# Patient Record
Sex: MALE | Race: OTHER
[De-identification: names, ages, dates, MRNs, and addresses within clinical notes are randomized per-mention and may not be internally consistent; named-entity substitution may affect disease eponyms.]

---

## 2018-03-01 PROBLEM — Z00.00 ENCOUNTER FOR PREVENTIVE HEALTH EXAMINATION: Status: ACTIVE | Noted: 2018-03-01

## 2018-07-19 ENCOUNTER — APPOINTMENT (OUTPATIENT)
Dept: UROLOGY | Facility: CLINIC | Age: 73
End: 2018-07-19
Payer: MEDICARE

## 2018-07-19 DIAGNOSIS — Z78.9 OTHER SPECIFIED HEALTH STATUS: ICD-10-CM

## 2018-07-19 DIAGNOSIS — I25.2 OLD MYOCARDIAL INFARCTION: ICD-10-CM

## 2018-07-19 DIAGNOSIS — Z86.73 PERSONAL HISTORY OF TRANSIENT ISCHEMIC ATTACK (TIA), AND CEREBRAL INFARCTION W/OUT RESIDUAL DEFICITS: ICD-10-CM

## 2018-07-19 DIAGNOSIS — Z80.9 FAMILY HISTORY OF MALIGNANT NEOPLASM, UNSPECIFIED: ICD-10-CM

## 2018-07-19 PROCEDURE — 99214 OFFICE O/P EST MOD 30 MIN: CPT

## 2018-07-19 PROCEDURE — 99204 OFFICE O/P NEW MOD 45 MIN: CPT

## 2018-07-19 RX ORDER — ASPIRIN 325 MG
81 TABLET ORAL
Refills: 0 | Status: ACTIVE | COMMUNITY

## 2018-07-19 RX ORDER — UBIDECARENONE 100 MG
100 CAPSULE ORAL
Refills: 0 | Status: ACTIVE | COMMUNITY

## 2018-07-19 RX ORDER — MENTHOL/CAMPHOR 0.5 %-0.5%
1000 LOTION (ML) TOPICAL
Refills: 0 | Status: ACTIVE | COMMUNITY

## 2019-12-11 ENCOUNTER — APPOINTMENT (OUTPATIENT)
Dept: UROLOGY | Facility: CLINIC | Age: 74
End: 2019-12-11

## 2020-01-24 ENCOUNTER — OUTPATIENT (OUTPATIENT)
Dept: OUTPATIENT SERVICES | Facility: HOSPITAL | Age: 75
LOS: 1 days | Discharge: HOME | End: 2020-01-24
Payer: MEDICARE

## 2020-01-24 DIAGNOSIS — J98.4 OTHER DISORDERS OF LUNG: ICD-10-CM

## 2020-01-24 PROCEDURE — 71046 X-RAY EXAM CHEST 2 VIEWS: CPT | Mod: 26

## 2020-02-06 ENCOUNTER — OUTPATIENT (OUTPATIENT)
Dept: OUTPATIENT SERVICES | Facility: HOSPITAL | Age: 75
LOS: 1 days | Discharge: HOME | End: 2020-02-06

## 2020-02-06 ENCOUNTER — INPATIENT (INPATIENT)
Facility: HOSPITAL | Age: 75
LOS: 0 days | Discharge: HOME | End: 2020-02-07
Attending: INTERNAL MEDICINE | Admitting: INTERNAL MEDICINE
Payer: MEDICARE

## 2020-02-06 VITALS
HEART RATE: 53 BPM | WEIGHT: 190.04 LBS | DIASTOLIC BLOOD PRESSURE: 68 MMHG | RESPIRATION RATE: 16 BRPM | SYSTOLIC BLOOD PRESSURE: 140 MMHG | TEMPERATURE: 98 F | HEIGHT: 67 IN

## 2020-02-06 DIAGNOSIS — I25.2 OLD MYOCARDIAL INFARCTION: ICD-10-CM

## 2020-02-06 DIAGNOSIS — E78.5 HYPERLIPIDEMIA, UNSPECIFIED: ICD-10-CM

## 2020-02-06 DIAGNOSIS — I25.810 ATHEROSCLEROSIS OF CORONARY ARTERY BYPASS GRAFT(S) WITHOUT ANGINA PECTORIS: Chronic | ICD-10-CM

## 2020-02-06 DIAGNOSIS — I25.701 ATHEROSCLEROSIS OF CORONARY ARTERY BYPASS GRAFT(S), UNSPECIFIED, WITH ANGINA PECTORIS WITH DOCUMENTED SPASM: Chronic | ICD-10-CM

## 2020-02-06 DIAGNOSIS — Z95.1 PRESENCE OF AORTOCORONARY BYPASS GRAFT: ICD-10-CM

## 2020-02-06 DIAGNOSIS — I10 ESSENTIAL (PRIMARY) HYPERTENSION: ICD-10-CM

## 2020-02-06 DIAGNOSIS — I34.0 NONRHEUMATIC MITRAL (VALVE) INSUFFICIENCY: ICD-10-CM

## 2020-02-06 DIAGNOSIS — Z79.82 LONG TERM (CURRENT) USE OF ASPIRIN: ICD-10-CM

## 2020-02-06 DIAGNOSIS — I25.119 ATHEROSCLEROTIC HEART DISEASE OF NATIVE CORONARY ARTERY WITH UNSPECIFIED ANGINA PECTORIS: ICD-10-CM

## 2020-02-06 LAB
ANION GAP SERPL CALC-SCNC: 12 MMOL/L — SIGNIFICANT CHANGE UP (ref 7–14)
BUN SERPL-MCNC: 18 MG/DL — SIGNIFICANT CHANGE UP (ref 10–20)
CALCIUM SERPL-MCNC: 9.1 MG/DL — SIGNIFICANT CHANGE UP (ref 8.5–10.1)
CHLORIDE SERPL-SCNC: 101 MMOL/L — SIGNIFICANT CHANGE UP (ref 98–110)
CO2 SERPL-SCNC: 27 MMOL/L — SIGNIFICANT CHANGE UP (ref 17–32)
CREAT SERPL-MCNC: 1.1 MG/DL — SIGNIFICANT CHANGE UP (ref 0.7–1.5)
GLUCOSE SERPL-MCNC: 94 MG/DL — SIGNIFICANT CHANGE UP (ref 70–99)
HCT VFR BLD CALC: 38.5 % — LOW (ref 42–52)
HGB BLD-MCNC: 12.8 G/DL — LOW (ref 14–18)
MCHC RBC-ENTMCNC: 30.3 PG — SIGNIFICANT CHANGE UP (ref 27–31)
MCHC RBC-ENTMCNC: 33.2 G/DL — SIGNIFICANT CHANGE UP (ref 32–37)
MCV RBC AUTO: 91 FL — SIGNIFICANT CHANGE UP (ref 80–94)
NRBC # BLD: 0 /100 WBCS — SIGNIFICANT CHANGE UP (ref 0–0)
PLATELET # BLD AUTO: 164 K/UL — SIGNIFICANT CHANGE UP (ref 130–400)
POTASSIUM SERPL-MCNC: 4.2 MMOL/L — SIGNIFICANT CHANGE UP (ref 3.5–5)
POTASSIUM SERPL-SCNC: 4.2 MMOL/L — SIGNIFICANT CHANGE UP (ref 3.5–5)
RBC # BLD: 4.23 M/UL — LOW (ref 4.7–6.1)
RBC # FLD: 13.5 % — SIGNIFICANT CHANGE UP (ref 11.5–14.5)
SODIUM SERPL-SCNC: 140 MMOL/L — SIGNIFICANT CHANGE UP (ref 135–146)
WBC # BLD: 7.14 K/UL — SIGNIFICANT CHANGE UP (ref 4.8–10.8)
WBC # FLD AUTO: 7.14 K/UL — SIGNIFICANT CHANGE UP (ref 4.8–10.8)

## 2020-02-06 RX ORDER — CLOPIDOGREL BISULFATE 75 MG/1
75 TABLET, FILM COATED ORAL DAILY
Refills: 0 | Status: DISCONTINUED | OUTPATIENT
Start: 2020-02-06 | End: 2020-02-07

## 2020-02-06 RX ORDER — METOPROLOL TARTRATE 50 MG
50 TABLET ORAL DAILY
Refills: 0 | Status: DISCONTINUED | OUTPATIENT
Start: 2020-02-06 | End: 2020-02-07

## 2020-02-06 RX ORDER — INFLUENZA VIRUS VACCINE 15; 15; 15; 15 UG/.5ML; UG/.5ML; UG/.5ML; UG/.5ML
0.5 SUSPENSION INTRAMUSCULAR ONCE
Refills: 0 | Status: DISCONTINUED | OUTPATIENT
Start: 2020-02-06 | End: 2020-02-07

## 2020-02-06 RX ORDER — AMLODIPINE BESYLATE 2.5 MG/1
5 TABLET ORAL DAILY
Refills: 0 | Status: DISCONTINUED | OUTPATIENT
Start: 2020-02-06 | End: 2020-02-07

## 2020-02-06 RX ORDER — LOSARTAN POTASSIUM 100 MG/1
100 TABLET, FILM COATED ORAL DAILY
Refills: 0 | Status: DISCONTINUED | OUTPATIENT
Start: 2020-02-06 | End: 2020-02-07

## 2020-02-06 RX ORDER — HYDROCHLOROTHIAZIDE 25 MG
25 TABLET ORAL DAILY
Refills: 0 | Status: DISCONTINUED | OUTPATIENT
Start: 2020-02-06 | End: 2020-02-07

## 2020-02-06 RX ORDER — SODIUM CHLORIDE 9 MG/ML
1000 INJECTION INTRAMUSCULAR; INTRAVENOUS; SUBCUTANEOUS
Refills: 0 | Status: DISCONTINUED | OUTPATIENT
Start: 2020-02-06 | End: 2020-02-07

## 2020-02-06 RX ORDER — ASPIRIN/CALCIUM CARB/MAGNESIUM 324 MG
81 TABLET ORAL DAILY
Refills: 0 | Status: DISCONTINUED | OUTPATIENT
Start: 2020-02-06 | End: 2020-02-07

## 2020-02-06 RX ORDER — ATORVASTATIN CALCIUM 80 MG/1
40 TABLET, FILM COATED ORAL AT BEDTIME
Refills: 0 | Status: DISCONTINUED | OUTPATIENT
Start: 2020-02-06 | End: 2020-02-07

## 2020-02-06 RX ADMIN — ATORVASTATIN CALCIUM 40 MILLIGRAM(S): 80 TABLET, FILM COATED ORAL at 21:26

## 2020-02-06 NOTE — CHART NOTE - NSCHARTNOTEFT_GEN_A_CORE
PRE-OP DIAGNOSIS: stable angina class III    PROCEDURE: [facility] with coronary angiography    Physician: Rudy  Assistant: Judson    ANESTHESIA TYPE:  [  ]General Anesthesia  [ x ] Sedation  [ x ] Local/Regional    ESTIMATED BLOOD LOSS:    10   mL    CONDITION  [  ] Critical  [  ] Serious  [  ]Fair  [ x ]Good      SPECIMENS REMOVED (IF APPLICABLE): N/A      IV CONTRAST:       150      mL      IMPLANTS (IF APPLICABLE)      FINDINGS    Left Heart Catheterization:  LVEF%:  LVEDP:  [ ] Normal Coronary Arteries  [ ] Luminal Irregularities  [ ] Non-obstructive CAD      LEFT HEART CATHETERIZATION                                    Left main: distal 80% lesion    LAD:  prox 100% . distal vessel supplied by patent LIMA to LAD                       Diag: supplied by patent SVG    Left Circumflex: ostial 99% calcified lesion. an intervention was performed.   OM:    Right Coronary Artery: ostial 50% lesion, mild diffuse disease throughout. RV marginal has 80% lesion in middle 1/3 of vessel.   RPDA  RPL    Ramus Intermed:    DOMINANCE: Right    ACCESS: r femoral  CLOSURE: manual    INTERVENTION  IMPLANTS: TAVARES x1 in LM-->pLCx      POST-OP DIAGNOSIS: 3V native CAD, patent bypass grafts          PLAN OF CARE  [ ] D/C Home today  [x ]  D/C in AM  [ ] Return to In-patient bed  [ x] Admit for observation  [ ] Return for staged procedure:  [ ] CT Surgery consult called  [x ]  Continue DAPT, B-blocker & Statin therapy    Results of procedure/ plan of care discussed with patient/  in detail.

## 2020-02-06 NOTE — H&P CARDIOLOGY - HISTORY OF PRESENT ILLNESS
74 year old male patient with PMHx of HTN, DL, CAD s/p MI in 2000 s/p CABG LIMA to LAD and SVG to Diag. presented for [facility]. complains of new intermittent chest pain  patient is seen at bedside, hemodynamically stable    NKA    Pre cath note:    indication:  [ ] STEMI                [ ] NSTEMI                 [x ] Unstable Angina                     [ ] Stable Angina     non-invasive testing:                                               result: [ ] high risk  [ ] intermediate risk  [ ] low risk    Anti- Anginal medications:                    [ ] not used                       [x ] used                   [ ] not used but strong indication not to use    Ejection Fraction ?                   [ ] >30%            [ ] <30%    COPD                   [ ] mild (on chronic bronchodilators)  [ ] moderate (on chronic steroid therapy)      [ ] severe (indication for home O2 or PACO2 >50)    Other risk factors:                       [x ] Previous MI                    [ ] CVA/ stroke                    [ ] carotid stent/ CEA                    [ ] PVD- (arterial aneurysm, non-palpable pulses, tortuous vessel with inability to insert catheter, infra-renal dissection, renal or subclavian artery stenosis)                    [ ] Renal Failure                    [ ] diabetic                    [x ] previous CABG 74 year old male patient with PMHx of HTN, DL, CAD s/p MI in 2000 s/p CABG LIMA to LAD and SVG to Diag. presented for [facility]. complains of new intermittent chest pain with the diagnosis of unstable angina.  patient is seen at bedside, hemodynamically stable    NKA    Pre cath note:    indication:  [ ] STEMI                [ ] NSTEMI                 [x ] Unstable Angina                     [ ] Stable Angina     non-invasive testing:                                               result: [ ] high risk  [ ] intermediate risk  [ ] low risk    Anti- Anginal medications:                    [ ] not used                       [x ] used                   [ ] not used but strong indication not to use    Ejection Fraction ?                   [ ] >30%            [ ] <30%    COPD                   [ ] mild (on chronic bronchodilators)  [ ] moderate (on chronic steroid therapy)      [ ] severe (indication for home O2 or PACO2 >50)    Other risk factors:                       [x ] Previous MI                    [ ] CVA/ stroke                    [ ] carotid stent/ CEA                    [ ] PVD- (arterial aneurysm, non-palpable pulses, tortuous vessel with inability to insert catheter, infra-renal dissection, renal or subclavian artery stenosis)                    [ ] Renal Failure                    [ ] diabetic                    [x ] previous CABG

## 2020-02-06 NOTE — PROGRESS NOTE ADULT - SUBJECTIVE AND OBJECTIVE BOX
PREOPERATIVE DAY OF PROCEDURE EVALUATION:  I have personally seen and examined the patient.  I have reviewed  the chart of the patient. I have discussed the risks and benefits of the procedure with the patient up to and including death and the patient agrees with the planned procedure.  (Signed electronically by _SAUD Grant MD________)  02-06-20 @ 09:50

## 2020-02-06 NOTE — ASU PATIENT PROFILE, ADULT - PSH
Arteriosclerosis of autologous arterial coronary artery bypass graft, angina presence unspecified    Atherosclerosis of CABG w angina pectoris w documented spasm

## 2020-02-07 ENCOUNTER — TRANSCRIPTION ENCOUNTER (OUTPATIENT)
Age: 75
End: 2020-02-07

## 2020-02-07 VITALS
HEART RATE: 63 BPM | SYSTOLIC BLOOD PRESSURE: 106 MMHG | TEMPERATURE: 98 F | RESPIRATION RATE: 18 BRPM | DIASTOLIC BLOOD PRESSURE: 56 MMHG

## 2020-02-07 PROCEDURE — 93010 ELECTROCARDIOGRAM REPORT: CPT

## 2020-02-07 RX ORDER — ENOXAPARIN SODIUM 100 MG/ML
40 INJECTION SUBCUTANEOUS AT BEDTIME
Refills: 0 | Status: DISCONTINUED | OUTPATIENT
Start: 2020-02-07 | End: 2020-02-07

## 2020-02-07 RX ADMIN — CLOPIDOGREL BISULFATE 75 MILLIGRAM(S): 75 TABLET, FILM COATED ORAL at 12:31

## 2020-02-07 RX ADMIN — AMLODIPINE BESYLATE 5 MILLIGRAM(S): 2.5 TABLET ORAL at 06:12

## 2020-02-07 RX ADMIN — LOSARTAN POTASSIUM 100 MILLIGRAM(S): 100 TABLET, FILM COATED ORAL at 06:12

## 2020-02-07 RX ADMIN — Medication 25 MILLIGRAM(S): at 06:12

## 2020-02-07 RX ADMIN — Medication 50 MILLIGRAM(S): at 06:12

## 2020-02-07 RX ADMIN — Medication 81 MILLIGRAM(S): at 12:31

## 2020-02-07 NOTE — DISCHARGE NOTE PROVIDER - NSDCCPCAREPLAN_GEN_ALL_CORE_FT
PRINCIPAL DISCHARGE DIAGNOSIS  Diagnosis: Angina pectoris without myocardial infarction  Assessment and Plan of Treatment: continue medical regimen as prescribed      SECONDARY DISCHARGE DIAGNOSES  Diagnosis: CAD S/P percutaneous coronary angioplasty  Assessment and Plan of Treatment: continue medical regimen as prescribed

## 2020-02-07 NOTE — DISCHARGE NOTE NURSING/CASE MANAGEMENT/SOCIAL WORK - PATIENT PORTAL LINK FT
You can access the FollowMyHealth Patient Portal offered by [facility] by registering at the following website: [email or web address]. By joining [facility]’s FollowMyHealth portal, you will also be able to view your health information using other applications (apps) compatible with our system.

## 2020-02-07 NOTE — DISCHARGE NOTE PROVIDER - NSDCFUADDINST_GEN_ALL_CORE_FT
no heavy lifting > 10lbs x 1 week; no driving x 24 hours; no baths, swimming pools x 1 week; may shower  continue DAPT, BB, Statin.  Instructed to call 911 if chest pain, shortness of breath or bleeding from access site. F/U with cardiologist in 1 week   low sodium low fat low cholesterol diet  continue medical regimen to prevent chest pain

## 2020-02-07 NOTE — DISCHARGE NOTE PROVIDER - HOSPITAL COURSE
75 y/o M s/p PCI LCX , admitted for observation, no vents overnight, stable for d/c as discussed with DR. Grant

## 2020-02-07 NOTE — DISCHARGE NOTE PROVIDER - NSDCCPTREATMENT_GEN_ALL_CORE_FT
PRINCIPAL PROCEDURE  Procedure: Percutaneous coronary intervention (PCI) with insertion of stent into left circumflex coronary artery  Findings and Treatment: continue medical regimen as prescribed

## 2020-02-07 NOTE — PROGRESS NOTE ADULT - SUBJECTIVE AND OBJECTIVE BOX
SUBJ:No chest pain or shortness of breath      MEDICATIONS  (STANDING):  amLODIPine   Tablet 5 milliGRAM(s) Oral daily  aspirin  chewable 81 milliGRAM(s) Oral daily  atorvastatin 40 milliGRAM(s) Oral at bedtime  clopidogrel Tablet 75 milliGRAM(s) Oral daily  enoxaparin Injectable 40 milliGRAM(s) SubCutaneous at bedtime  hydrochlorothiazide 25 milliGRAM(s) Oral daily  influenza   Vaccine 0.5 milliLiter(s) IntraMuscular once  losartan 100 milliGRAM(s) Oral daily  metoprolol succinate ER 50 milliGRAM(s) Oral daily  sodium chloride 0.9%. 1000 milliLiter(s) (100 mL/Hr) IV Continuous <Continuous>    MEDICATIONS  (PRN):            Vital Signs Last 24 Hrs  T(C): 36.7 (07 Feb 2020 13:43), Max: 36.7 (07 Feb 2020 13:43)  T(F): 98 (07 Feb 2020 13:43), Max: 98 (07 Feb 2020 13:43)  HR: 63 (07 Feb 2020 13:43) (56 - 63)  BP: 106/56 (07 Feb 2020 13:43) (106/56 - 148/72)  BP(mean): --  RR: 18 (07 Feb 2020 13:43) (18 - 18)  SpO2: 98% (06 Feb 2020 16:31) (98% - 98%)      ECG:NML    TTE:    LABS:                        12.8   7.14  )-----------( 164      ( 06 Feb 2020 20:48 )             38.5     02-06    140  |  101  |  18  ----------------------------<  94  4.2   |  27  |  1.1    Ca    9.1      06 Feb 2020 20:48              I&O's Summary    06 Feb 2020 07:01  -  07 Feb 2020 07:00  --------------------------------------------------------  IN: 0 mL / OUT: 1000 mL / NET: -1000 mL    07 Feb 2020 07:01  -  07 Feb 2020 15:19  --------------------------------------------------------  IN: 480 mL / OUT: 0 mL / NET: 480 mL      BNP

## 2020-02-07 NOTE — PROGRESS NOTE ADULT - SUBJECTIVE AND OBJECTIVE BOX
Cardiology Follow up    GABBY STEINER   74y Male  PAST MEDICAL & SURGICAL HISTORY:  Mitral regurgitation  High blood pressure disorder  Atherosclerosis of CABG w angina pectoris w documented spasm  Arteriosclerosis of autologous arterial coronary artery bypass graft, angina presence unspecified       HPI:  74 year old male patient with PMHx of HTN, DL, CAD s/p MI in 2000 s/p CABG LIMA to LAD and SVG to Diag. presented for [facility]. complains of new intermittent chest pain  patient is seen at bedside, hemodynamically stable    NKA    Pre cath note:    indication:  [ ] STEMI                [ ] NSTEMI                 [x ] Unstable Angina                     [ ] Stable Angina     non-invasive testing:                                               result: [ ] high risk  [ ] intermediate risk  [ ] low risk    Anti- Anginal medications:                    [ ] not used                       [x ] used                   [ ] not used but strong indication not to use    Ejection Fraction ?                   [ ] >30%            [ ] <30%    COPD                   [ ] mild (on chronic bronchodilators)  [ ] moderate (on chronic steroid therapy)      [ ] severe (indication for home O2 or PACO2 >50)    Other risk factors:                       [x ] Previous MI                    [ ] CVA/ stroke                    [ ] carotid stent/ CEA                    [ ] PVD- (arterial aneurysm, non-palpable pulses, tortuous vessel with inability to insert catheter, infra-renal dissection, renal or subclavian artery stenosis)                    [ ] Renal Failure                    [ ] diabetic                    [x ] previous CABG (06 Feb 2020 09:30)    Allergies    No Known Allergies    Intolerances      Patient without complaints. Pt ambulated without issues/symptoms  Denies CP, SOB, palpitations, or dizziness  No events on telemetry overnight    Vital Signs Last 24 Hrs  T(C): 35.8 (07 Feb 2020 05:01), Max: 36.7 (06 Feb 2020 09:16)  T(F): 96.4 (07 Feb 2020 05:01), Max: 98 (06 Feb 2020 09:16)  HR: 59 (07 Feb 2020 05:01) (53 - 59)  BP: 139/69 (07 Feb 2020 05:01) (119/54 - 148/72)  BP(mean): --  RR: 18 (07 Feb 2020 05:01) (16 - 18)  SpO2: 98% (06 Feb 2020 16:31) (98% - 98%)    MEDICATIONS  (STANDING):  amLODIPine   Tablet 5 milliGRAM(s) Oral daily  aspirin  chewable 81 milliGRAM(s) Oral daily  atorvastatin 40 milliGRAM(s) Oral at bedtime  clopidogrel Tablet 75 milliGRAM(s) Oral daily  enoxaparin Injectable 40 milliGRAM(s) SubCutaneous at bedtime  hydrochlorothiazide 25 milliGRAM(s) Oral daily  influenza   Vaccine 0.5 milliLiter(s) IntraMuscular once  losartan 100 milliGRAM(s) Oral daily  metoprolol succinate ER 50 milliGRAM(s) Oral daily  sodium chloride 0.9%. 1000 milliLiter(s) (100 mL/Hr) IV Continuous <Continuous>    MEDICATIONS  (PRN):      REVIEW OF SYSTEMS:          CONSTITUTIONAL: No weakness, fevers or chills          EYES/ENT: No visual changes;  No vertigo or throat pain           NECK: No pain or stiffness          RESPIRATORY: No cough, wheezing, hemoptysis          CARDIOVASCULAR: no pain, no MCDANIEL, no palpitations           GASTROINTESTINAL: No abdominal or epigastric pain. No nausea, vomiting, or hematemesis;           GENITOURINARY: No dysuria, frequency or hematuria          NEUROLOGICAL: No numbness or weakness          SKIN: No itching, rashes    PHYSICAL EXAM:           CONSTITUTIONAL: Well-developed; well-nourished; in no acute distress  	SKIN: warm, dry  	HEAD: Normocephalic; atraumatic  	EYES: PERRL.  	ENT: No nasal discharge, airway clear, mucous membranes moist  	NECK: Supple; non tender.  	CARD: +S1, +S2, no murmurs, gallops, or rubs. (Regular) rate and rhythm    	RESP: No wheezes, rales or rhonchi. CTA B/L  	ABD: soft ntnd, + BS x 4 quadrants  	EXT: moves all extremities,  no clubbing, cyanosis or edema  	NEURO: Alert and oriented x3, no focal deficits          PSYCH: Cooperative, appropriate          VASCULAR:  + Rad / + PTs / + DPs          EXTREMITY:             Right Groin:  Dressing removed, + pulses, access site soft, no hematoma, no pain, no numbness, no signs and symptoms of infection             ECG: Ventricular Rate 58 BPM    Atrial Rate 58 BPM    P-R Interval 180 ms    QRS Duration 102 ms    Q-T Interval 438 ms    QTC Calculation(Bezet) 429 ms    P Axis 46 degrees    R Axis -45 degrees    T Axis 11 degrees    Diagnosis Line Sinus bradycardia  Incomplete right bundle branch block  Left anterior fascicular block  Moderate voltage criteria for LVH, may be normal variant  Possible Lateral infarct , age undetermined  Abnormal ECG    Confirmed by KEYON IVEY MD ([id number]) on 2/7/2020 8:55:12 AM    LABS:                        12.8   7.14  )-----------( 164      ( 06 Feb 2020 20:48 )             38.5     02-06    140  |  101  |  18  ----------------------------<  94  4.2   |  27  |  1.1    Ca    9.1      06 Feb 2020 20:48      A/P:  I discussed the case with Cardiologist Dr. Grant  and recommend the following:    S/P PCI pLCX  	     Continue DAPT (asa 81mg daily, plavix 75mg daily),  B-Blocker, Statin Therapy, ARB                   Patient given 30 day supply of ( Aspirin 81 mg daily and Plavix 75 mg daily ) to take at home                   pt currently on plavix at home, states has supply at home                    oob-, ambulate with assistance                   monitor access site                    Patient agreeing to take DAPT for at least one year or as directed by cardiologist                    Pt given instructions on importance of taking antiplatelet medication or risk acute stent thrombosis/death                   Post cath instructions, access site care and activity restrictions reviewed with patient                     Discussed with patient to return to hospital if experience chest pain, shortness breath, dizziness and site bleeding                   Aggressive risk factor modification, diet counseling, smoking cessation discussed with patient                       Can discharge patient from cardiac standpoint if ambualting without symptoms and access site stable once cleared by Dr. Grant                   Follow up with Cardiology Dr. Basurto in 1-2 weeks.  Instructed to call and make an appointment

## 2020-02-07 NOTE — DISCHARGE NOTE PROVIDER - NSDCMRMEDTOKEN_GEN_ALL_CORE_FT
amLODIPine 5 mg oral tablet: tab(s) orally once a day  aspirin 81 mg oral tablet: 1 tab(s) orally once a day  clopidogrel 75 mg oral tablet: 75 tab(s) orally once a day  hydroCHLOROthiazide 25 mg oral tablet: 25 tab(s) orally once a day  losartan 100 mg oral tablet: 1 tab(s) orally once a day  Metoprolol Succinate ER 50 mg oral tablet, extended release: 1 tab(s) orally once a day  Vytorin 10 mg-40 mg oral tablet: 1 tab(s) orally once a day

## 2020-02-07 NOTE — DISCHARGE NOTE PROVIDER - NSDCFUSCHEDAPPT_GEN_ALL_CORE_FT
GABBY STEINER ; 02/26/2020 ; [facility] [address] [place] [address] GABBY STEINER ; 02/26/2020 ; [facility] [address] [place] [address] GABBY STEINER ; 02/26/2020 ; [facility] [address] [place] [address]

## 2020-02-07 NOTE — DISCHARGE NOTE PROVIDER - CARE PROVIDER_API CALL
Amaury Basurto)  Cardiovascular Disease; Internal Medicine  [address] [place] [id number]  Phone: [phone]  Fax: [phone]  Established Patient  Follow Up Time: 2 weeks

## 2020-03-11 DIAGNOSIS — I34.0 NONRHEUMATIC MITRAL (VALVE) INSUFFICIENCY: ICD-10-CM

## 2020-03-11 DIAGNOSIS — E11.9 TYPE 2 DIABETES MELLITUS WITHOUT COMPLICATIONS: ICD-10-CM

## 2020-03-11 DIAGNOSIS — I20.0 UNSTABLE ANGINA: ICD-10-CM

## 2020-03-11 DIAGNOSIS — I10 ESSENTIAL (PRIMARY) HYPERTENSION: ICD-10-CM

## 2020-03-11 DIAGNOSIS — Z95.1 PRESENCE OF AORTOCORONARY BYPASS GRAFT: ICD-10-CM

## 2020-03-11 DIAGNOSIS — Z79.02 LONG TERM (CURRENT) USE OF ANTITHROMBOTICS/ANTIPLATELETS: ICD-10-CM

## 2020-03-11 DIAGNOSIS — Z79.82 LONG TERM (CURRENT) USE OF ASPIRIN: ICD-10-CM

## 2020-03-11 DIAGNOSIS — E78.5 HYPERLIPIDEMIA, UNSPECIFIED: ICD-10-CM

## 2020-03-11 DIAGNOSIS — I25.110 ATHEROSCLEROTIC HEART DISEASE OF NATIVE CORONARY ARTERY WITH UNSTABLE ANGINA PECTORIS: ICD-10-CM

## 2020-06-17 ENCOUNTER — APPOINTMENT (OUTPATIENT)
Dept: UROLOGY | Facility: CLINIC | Age: 75
End: 2020-06-17
Payer: MEDICARE

## 2020-06-17 VITALS — DIASTOLIC BLOOD PRESSURE: 69 MMHG | SYSTOLIC BLOOD PRESSURE: 128 MMHG | HEART RATE: 65 BPM | TEMPERATURE: 98.3 F

## 2020-06-17 PROBLEM — I34.0 NONRHEUMATIC MITRAL (VALVE) INSUFFICIENCY: Chronic | Status: ACTIVE | Noted: 2020-02-06

## 2020-06-17 PROBLEM — I10 ESSENTIAL (PRIMARY) HYPERTENSION: Chronic | Status: ACTIVE | Noted: 2020-02-06

## 2020-06-17 PROCEDURE — 99214 OFFICE O/P EST MOD 30 MIN: CPT

## 2020-06-17 PROCEDURE — 51798 US URINE CAPACITY MEASURE: CPT

## 2021-06-17 ENCOUNTER — APPOINTMENT (OUTPATIENT)
Dept: UROLOGY | Facility: CLINIC | Age: 76
End: 2021-06-17

## 2021-06-30 PROBLEM — I25.2 PAST MYOCARDIAL INFARCTION: Status: ACTIVE | Noted: 2021-06-30

## 2021-07-02 ENCOUNTER — LABORATORY RESULT (OUTPATIENT)
Age: 76
End: 2021-07-02

## 2021-07-02 ENCOUNTER — APPOINTMENT (OUTPATIENT)
Dept: CARDIOLOGY | Facility: CLINIC | Age: 76
End: 2021-07-02
Payer: MEDICARE

## 2021-07-02 VITALS
DIASTOLIC BLOOD PRESSURE: 72 MMHG | WEIGHT: 183 LBS | HEIGHT: 67 IN | SYSTOLIC BLOOD PRESSURE: 120 MMHG | BODY MASS INDEX: 28.72 KG/M2

## 2021-07-02 DIAGNOSIS — I25.2 OLD MYOCARDIAL INFARCTION: ICD-10-CM

## 2021-07-02 PROCEDURE — 99213 OFFICE O/P EST LOW 20 MIN: CPT

## 2021-07-02 PROCEDURE — 93000 ELECTROCARDIOGRAM COMPLETE: CPT

## 2021-07-02 RX ORDER — METOPROLOL TARTRATE 50 MG/1
50 TABLET, FILM COATED ORAL
Refills: 0 | Status: DISCONTINUED | COMMUNITY
End: 2021-07-02

## 2021-07-02 RX ORDER — LOSARTAN POTASSIUM AND HYDROCHLOROTHIAZIDE 25; 100 MG/1; MG/1
100-25 TABLET ORAL
Refills: 0 | Status: DISCONTINUED | COMMUNITY
End: 2021-07-02

## 2021-07-02 NOTE — REVIEW OF SYSTEMS
[Hearing Loss] : hearing loss [Fever] : no fever [Blurry Vision] : no blurred vision [SOB] : no shortness of breath [Chest Discomfort] : no chest discomfort [Palpitations] : no palpitations [Cough] : no cough [Wheezing] : no wheezing [Abdominal Pain] : no abdominal pain [Dysphagia] : no dysphagia [Constipation] : no constipation [Pain During Urination] : no dysuria [Joint Pain] : no joint pain [Joint Stiffness] : no joint stiffness [Myalgia] : no myalgia [Rash] : no rash [Skin Lesions] : no skin lesions [Dizziness] : no dizziness [Confusion] : no confusion was observed [Easy Bleeding] : no tendency for easy bleeding

## 2021-07-02 NOTE — REASON FOR VISIT
[FreeTextEntry1] : Feels good . No chest pain No SOB. MCDANIEL  steps same. Not exercise He lost 6 pounds

## 2021-07-02 NOTE — DISCUSSION/SUMMARY
[FreeTextEntry1] : The Pt had  CABG 2000. He has TEO not use cpap. He lost 6 lb. Snoring much less. 2/18 stress non diagnostic EKG positive. Chest pain resolved. He is aware he has CAD. Told any more symptoms must go to hospital. Neg dobutamine SE 4/19. Mild MR, MOD AI .He had chest pain every day. Stent CX 2/20. CABG was LIMA vein diagonal. Jansen does blood. Continue weight loss. He got covid vaccine

## 2021-07-02 NOTE — PHYSICAL EXAM
[5th Left ICS - MCL] : palpated at the 5th LICS in the midclavicular line [No Precordial Heave] : no precordial heave was noted [Normal Rate] : normal [Rhythm Regular] : regular [Normal S1] : normal S1 [II] : a grade 2 [I] : a grade 1 [No Pitting Edema] : no pitting edema present [2+] : left 2+ [No Abnormalities] : the abdominal aorta was not enlarged and no bruit was heard [Normal] : alert and oriented, normal memory [S3] : no S3 [S4] : no S4 [Right Carotid Bruit] : no bruit heard over the right carotid [Left Carotid Bruit] : no bruit heard over the left carotid

## 2021-11-11 ENCOUNTER — APPOINTMENT (OUTPATIENT)
Dept: CARDIOLOGY | Facility: CLINIC | Age: 76
End: 2021-11-11
Payer: MEDICARE

## 2021-11-11 VITALS
DIASTOLIC BLOOD PRESSURE: 66 MMHG | HEIGHT: 67 IN | WEIGHT: 181 LBS | SYSTOLIC BLOOD PRESSURE: 118 MMHG | BODY MASS INDEX: 28.41 KG/M2

## 2021-11-11 PROCEDURE — 99213 OFFICE O/P EST LOW 20 MIN: CPT

## 2021-11-11 PROCEDURE — G0008: CPT

## 2021-11-11 PROCEDURE — 90662 IIV NO PRSV INCREASED AG IM: CPT

## 2021-11-11 NOTE — HISTORY OF PRESENT ILLNESS
[FreeTextEntry1] : Patient lost 2 pounds. No chest pain . No sob. Not  exercise. Active. Own rentals cares for them.

## 2021-11-11 NOTE — DISCUSSION/SUMMARY
[FreeTextEntry1] : The Pt had  CABG 2000. He has TEO not use cpap. He lost 2 lb. Snoring much less. 2/18 stress non diagnostic EKG positive. Chest pain resolved. He is aware he has CAD. Told any more symptoms must go to hospital. Neg dobutamine SE 4/19. Mild MR, MOD AI .He had chest pain every day. Stent CX 2/20. CABG was LIMA vein diagonal. Inderjit did  bloodi n past Continue weight loss. He got 2 covid vaccine

## 2021-11-11 NOTE — PHYSICAL EXAM
[Well Developed] : well developed [Well Nourished] : well nourished [Normal Venous Pressure] : normal venous pressure [No Carotid Bruit] : no carotid bruit [Normal Rate] : normal [Rhythm Regular] : regular [Normal S1] : normal S1 [Normal S2] : normal S2 [S3] : no S3 [S4] : no S4 [II] : a grade 2 [Clear Lung Fields] : clear lung fields [Soft] : abdomen soft [Normal Gait] : normal gait [No Edema] : no edema [No Rash] : no rash [Moves all extremities] : moves all extremities

## 2021-11-11 NOTE — REVIEW OF SYSTEMS
[Fever] : no fever [Blurry Vision] : no blurred vision [Hearing Loss] : hearing loss [SOB] : no shortness of breath [Chest Discomfort] : no chest discomfort [Palpitations] : no palpitations [Cough] : no cough [Wheezing] : no wheezing [Abdominal Pain] : no abdominal pain [Dysphagia] : no dysphagia [Constipation] : no constipation [Pain During Urination] : no dysuria [Joint Pain] : no joint pain [Joint Stiffness] : no joint stiffness [Myalgia] : no myalgia [Rash] : no rash [Skin Lesions] : no skin lesions [Dizziness] : no dizziness [Confusion] : no confusion was observed [Easy Bleeding] : no tendency for easy bleeding

## 2022-03-11 ENCOUNTER — APPOINTMENT (OUTPATIENT)
Dept: CARDIOLOGY | Facility: CLINIC | Age: 77
End: 2022-03-11
Payer: MEDICARE

## 2022-03-11 ENCOUNTER — NON-APPOINTMENT (OUTPATIENT)
Age: 77
End: 2022-03-11

## 2022-03-11 VITALS
SYSTOLIC BLOOD PRESSURE: 128 MMHG | HEIGHT: 67 IN | DIASTOLIC BLOOD PRESSURE: 78 MMHG | WEIGHT: 178 LBS | BODY MASS INDEX: 27.94 KG/M2

## 2022-03-11 PROCEDURE — 99214 OFFICE O/P EST MOD 30 MIN: CPT

## 2022-03-11 NOTE — HISTORY OF PRESENT ILLNESS
[FreeTextEntry1] : Patient lost 3 pounds. No chest pain . No sob. He gets MCDANIEL.  Not  exercise. 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

## 2022-03-11 NOTE — REASON FOR VISIT
[Hyperlipidemia] : hyperlipidemia [Hypertension] : hypertension [Coronary Artery Disease] : coronary artery disease [Spouse] : spouse

## 2022-03-11 NOTE — DISCUSSION/SUMMARY
[FreeTextEntry1] : The Pt had  CABG 2000. He has TEO not use c pap. He lost 3 lb. Snoring much less. 2/18 stress non diagnostic EKG positive. Chest pain resolved. He is aware he has CAD. Told any more symptoms must go to hospital. Neg dobutamine SE 4/19. Mild MR, MOD AI .He had chest pain every day. Stent CX 2/20. CABG was LIMA vein diagonal. Continue weight loss. He got 3 covid vaccine

## 2022-03-14 LAB
ANION GAP SERPL CALC-SCNC: 13 MMOL/L
BASOPHILS # BLD AUTO: 0.03 K/UL
BASOPHILS NFR BLD AUTO: 0.5 %
BUN SERPL-MCNC: 18 MG/DL
CALCIUM SERPL-MCNC: 9.5 MG/DL
CHLORIDE SERPL-SCNC: 102 MMOL/L
CHOLEST SERPL-MCNC: 163 MG/DL
CO2 SERPL-SCNC: 26 MMOL/L
CREAT SERPL-MCNC: 1 MG/DL
EGFR: 78 ML/MIN/1.73M2
EOSINOPHIL # BLD AUTO: 0.12 K/UL
EOSINOPHIL NFR BLD AUTO: 2 %
GLUCOSE SERPL-MCNC: 85 MG/DL
HCT VFR BLD CALC: 41 %
HDLC SERPL-MCNC: 62 MG/DL
HGB BLD-MCNC: 13.4 G/DL
IMM GRANULOCYTES NFR BLD AUTO: 0.3 %
LDLC SERPL CALC-MCNC: 88 MG/DL
LYMPHOCYTES # BLD AUTO: 2.15 K/UL
LYMPHOCYTES NFR BLD AUTO: 36 %
MAN DIFF?: NORMAL
MCHC RBC-ENTMCNC: 29.8 PG
MCHC RBC-ENTMCNC: 32.7 G/DL
MCV RBC AUTO: 91.3 FL
MONOCYTES # BLD AUTO: 0.43 K/UL
MONOCYTES NFR BLD AUTO: 7.2 %
NEUTROPHILS # BLD AUTO: 3.22 K/UL
NEUTROPHILS NFR BLD AUTO: 54 %
NONHDLC SERPL-MCNC: 101 MG/DL
PLATELET # BLD AUTO: 173 K/UL
POTASSIUM SERPL-SCNC: 4.6 MMOL/L
RBC # BLD: 4.49 M/UL
RBC # FLD: 13 %
SODIUM SERPL-SCNC: 141 MMOL/L
T4 SERPL-MCNC: 8.1 UG/DL
TRIGL SERPL-MCNC: 60 MG/DL
TSH SERPL-ACNC: 2.91 UIU/ML
WBC # FLD AUTO: 5.97 K/UL

## 2022-04-12 ENCOUNTER — OUTPATIENT (OUTPATIENT)
Dept: OUTPATIENT SERVICES | Facility: HOSPITAL | Age: 77
LOS: 1 days | Discharge: HOME | End: 2022-04-12
Payer: MEDICARE

## 2022-04-12 DIAGNOSIS — I25.810 ATHEROSCLEROSIS OF CORONARY ARTERY BYPASS GRAFT(S) WITHOUT ANGINA PECTORIS: Chronic | ICD-10-CM

## 2022-04-12 DIAGNOSIS — I35.0 NONRHEUMATIC AORTIC (VALVE) STENOSIS: ICD-10-CM

## 2022-04-12 DIAGNOSIS — G47.33 OBSTRUCTIVE SLEEP APNEA (ADULT) (PEDIATRIC): ICD-10-CM

## 2022-04-12 DIAGNOSIS — I25.701 ATHEROSCLEROSIS OF CORONARY ARTERY BYPASS GRAFT(S), UNSPECIFIED, WITH ANGINA PECTORIS WITH DOCUMENTED SPASM: Chronic | ICD-10-CM

## 2022-04-12 PROCEDURE — 93351 STRESS TTE COMPLETE: CPT | Mod: 26

## 2022-07-09 ENCOUNTER — EMERGENCY (EMERGENCY)
Facility: HOSPITAL | Age: 77
LOS: 0 days | Discharge: HOME | End: 2022-07-09
Attending: EMERGENCY MEDICINE | Admitting: EMERGENCY MEDICINE

## 2022-07-09 VITALS
HEIGHT: 67 IN | OXYGEN SATURATION: 99 % | SYSTOLIC BLOOD PRESSURE: 173 MMHG | RESPIRATION RATE: 20 BRPM | DIASTOLIC BLOOD PRESSURE: 81 MMHG | WEIGHT: 175.05 LBS | TEMPERATURE: 98 F | HEART RATE: 70 BPM

## 2022-07-09 DIAGNOSIS — I25.10 ATHEROSCLEROTIC HEART DISEASE OF NATIVE CORONARY ARTERY WITHOUT ANGINA PECTORIS: ICD-10-CM

## 2022-07-09 DIAGNOSIS — M79.605 PAIN IN LEFT LEG: ICD-10-CM

## 2022-07-09 DIAGNOSIS — M79.604 PAIN IN RIGHT LEG: ICD-10-CM

## 2022-07-09 DIAGNOSIS — I10 ESSENTIAL (PRIMARY) HYPERTENSION: ICD-10-CM

## 2022-07-09 DIAGNOSIS — I25.701 ATHEROSCLEROSIS OF CORONARY ARTERY BYPASS GRAFT(S), UNSPECIFIED, WITH ANGINA PECTORIS WITH DOCUMENTED SPASM: Chronic | ICD-10-CM

## 2022-07-09 DIAGNOSIS — I25.810 ATHEROSCLEROSIS OF CORONARY ARTERY BYPASS GRAFT(S) WITHOUT ANGINA PECTORIS: Chronic | ICD-10-CM

## 2022-07-09 DIAGNOSIS — Z79.82 LONG TERM (CURRENT) USE OF ASPIRIN: ICD-10-CM

## 2022-07-09 DIAGNOSIS — Z95.1 PRESENCE OF AORTOCORONARY BYPASS GRAFT: ICD-10-CM

## 2022-07-09 DIAGNOSIS — E78.5 HYPERLIPIDEMIA, UNSPECIFIED: ICD-10-CM

## 2022-07-09 LAB
ALBUMIN SERPL ELPH-MCNC: 4.3 G/DL — SIGNIFICANT CHANGE UP (ref 3.5–5.2)
ALP SERPL-CCNC: 56 U/L — SIGNIFICANT CHANGE UP (ref 30–115)
ALT FLD-CCNC: 16 U/L — SIGNIFICANT CHANGE UP (ref 0–41)
ANION GAP SERPL CALC-SCNC: 8 MMOL/L — SIGNIFICANT CHANGE UP (ref 7–14)
AST SERPL-CCNC: 22 U/L — SIGNIFICANT CHANGE UP (ref 0–41)
BASOPHILS # BLD AUTO: 0.04 K/UL — SIGNIFICANT CHANGE UP (ref 0–0.2)
BASOPHILS NFR BLD AUTO: 0.7 % — SIGNIFICANT CHANGE UP (ref 0–1)
BILIRUB SERPL-MCNC: 0.3 MG/DL — SIGNIFICANT CHANGE UP (ref 0.2–1.2)
BUN SERPL-MCNC: 23 MG/DL — HIGH (ref 10–20)
CALCIUM SERPL-MCNC: 8.8 MG/DL — SIGNIFICANT CHANGE UP (ref 8.5–10.1)
CHLORIDE SERPL-SCNC: 102 MMOL/L — SIGNIFICANT CHANGE UP (ref 98–110)
CO2 SERPL-SCNC: 27 MMOL/L — SIGNIFICANT CHANGE UP (ref 17–32)
CREAT SERPL-MCNC: 1.2 MG/DL — SIGNIFICANT CHANGE UP (ref 0.7–1.5)
EGFR: 62 ML/MIN/1.73M2 — SIGNIFICANT CHANGE UP
EOSINOPHIL # BLD AUTO: 0.16 K/UL — SIGNIFICANT CHANGE UP (ref 0–0.7)
EOSINOPHIL NFR BLD AUTO: 2.6 % — SIGNIFICANT CHANGE UP (ref 0–8)
GLUCOSE SERPL-MCNC: 92 MG/DL — SIGNIFICANT CHANGE UP (ref 70–99)
HCT VFR BLD CALC: 35.6 % — LOW (ref 42–52)
HGB BLD-MCNC: 11.5 G/DL — LOW (ref 14–18)
IMM GRANULOCYTES NFR BLD AUTO: 0.2 % — SIGNIFICANT CHANGE UP (ref 0.1–0.3)
LYMPHOCYTES # BLD AUTO: 1.98 K/UL — SIGNIFICANT CHANGE UP (ref 1.2–3.4)
LYMPHOCYTES # BLD AUTO: 32.7 % — SIGNIFICANT CHANGE UP (ref 20.5–51.1)
MAGNESIUM SERPL-MCNC: 2.1 MG/DL — SIGNIFICANT CHANGE UP (ref 1.8–2.4)
MCHC RBC-ENTMCNC: 29.6 PG — SIGNIFICANT CHANGE UP (ref 27–31)
MCHC RBC-ENTMCNC: 32.3 G/DL — SIGNIFICANT CHANGE UP (ref 32–37)
MCV RBC AUTO: 91.8 FL — SIGNIFICANT CHANGE UP (ref 80–94)
MONOCYTES # BLD AUTO: 0.54 K/UL — SIGNIFICANT CHANGE UP (ref 0.1–0.6)
MONOCYTES NFR BLD AUTO: 8.9 % — SIGNIFICANT CHANGE UP (ref 1.7–9.3)
NEUTROPHILS # BLD AUTO: 3.33 K/UL — SIGNIFICANT CHANGE UP (ref 1.4–6.5)
NEUTROPHILS NFR BLD AUTO: 54.9 % — SIGNIFICANT CHANGE UP (ref 42.2–75.2)
NRBC # BLD: 0 /100 WBCS — SIGNIFICANT CHANGE UP (ref 0–0)
PLATELET # BLD AUTO: 147 K/UL — SIGNIFICANT CHANGE UP (ref 130–400)
POTASSIUM SERPL-MCNC: 4.1 MMOL/L — SIGNIFICANT CHANGE UP (ref 3.5–5)
POTASSIUM SERPL-SCNC: 4.1 MMOL/L — SIGNIFICANT CHANGE UP (ref 3.5–5)
PROT SERPL-MCNC: 6.6 G/DL — SIGNIFICANT CHANGE UP (ref 6–8)
RBC # BLD: 3.88 M/UL — LOW (ref 4.7–6.1)
RBC # FLD: 13.5 % — SIGNIFICANT CHANGE UP (ref 11.5–14.5)
SODIUM SERPL-SCNC: 137 MMOL/L — SIGNIFICANT CHANGE UP (ref 135–146)
WBC # BLD: 6.06 K/UL — SIGNIFICANT CHANGE UP (ref 4.8–10.8)
WBC # FLD AUTO: 6.06 K/UL — SIGNIFICANT CHANGE UP (ref 4.8–10.8)

## 2022-07-09 PROCEDURE — 93010 ELECTROCARDIOGRAM REPORT: CPT

## 2022-07-09 PROCEDURE — 99284 EMERGENCY DEPT VISIT MOD MDM: CPT | Mod: FS

## 2022-07-09 RX ORDER — SODIUM CHLORIDE 9 MG/ML
500 INJECTION INTRAMUSCULAR; INTRAVENOUS; SUBCUTANEOUS ONCE
Refills: 0 | Status: COMPLETED | OUTPATIENT
Start: 2022-07-09 | End: 2022-07-09

## 2022-07-09 RX ADMIN — SODIUM CHLORIDE 500 MILLILITER(S): 9 INJECTION INTRAMUSCULAR; INTRAVENOUS; SUBCUTANEOUS at 01:39

## 2022-07-09 NOTE — ED PROVIDER NOTE - CLINICAL SUMMARY MEDICAL DECISION MAKING FREE TEXT BOX
patient presents for evaluation of bilateral leg cramping its been intermittent between the past 5 to 7 days with no new trauma no falls no risk factors for DVTs physical exam reveals no signs of infection no redness no warmth pedal pulses 2+ capillary refill is normal there is no temperature discrepancy between the legs no signs of active vascular compromise at this time this is not consistent with a presentation of DVT as the patient symptoms are bilateral he has no leg swelling negative Homans' sign no chest pain no shortness of breath patient has no focal deficits he is able to bear weight normally we obtained EKG which is nonischemic labs there are no electrolyte abnormalities at this time patient does not appear to be dehydrated upon reevaluation is asymptomatic at this time stable for discharge advised to follow-up with his PMD in the next 24 to 48 hours advised to have a low threshold for return to the emergency department for any further concerns I had this conversation in the presence of the patient's wife as well patient presents for evaluation of bilateral leg cramping its been intermittent between the past 5 to 7 days with no new trauma no falls no risk factors for DVTs physical exam reveals no signs of infection no redness no warmth pedal pulses 2+ capillary refill is normal there is no temperature discrepancy between the legs no signs of active vascular compromise at this time this is not consistent with a presentation of DVT as the patient symptoms are bilateral he has no leg swelling negative Homans' sign no chest pain no shortness of breath patient has no focal deficits he is able to bear weight normally we obtained EKG which is nonischemic labs there are no electrolyte abnormalities at this time patient does not appear to be dehydrated upon reevaluation is asymptomatic at this time stable for discharge advised to follow-up with his PMD in the next 24 to 48 hours advised to have a low threshold for return to the emergency department for any further concerns I had this conversation in the presence of the patient's wife as well..

## 2022-07-09 NOTE — ED PROVIDER NOTE - ATTENDING APP SHARED VISIT CONTRIBUTION OF CARE
I was present for and supervised the key and critical aspects of the procedures performed during the care of the patient.Patient is a 77-year-old male presents for evaluation of bilateral leg cramping intermittently present over the past 5 to 7 days at random denies any chest pain shortness of breath exertional component fevers chills he denies any recent periods of immobilization or hospitalization or prolonged travel denies any fevers chills redness no falls or injuries    On physical exam the patient is normocephalic atraumatic pupils equal round react light accommodation extraocular muscles intact oropharynx clear chest clear to auscultation bilaterally abdomen soft nontender nondistended bowel sounds positive no guarding or rebound radial pulse 2+ pedal pulses 2+ patient is no edema of the bilateral lower extremities no signs of redness ascending cellulitis no signs of action at this time pedal pulses 2+ capillary refills normal    Assessment plan patient presents for evaluation of bilateral leg cramping its been intermittent between the past 5 to 7 days with no new trauma no falls no risk factors for DVTs physical exam reveals no signs of infection no redness no warmth pedal pulses 2+ capillary refill is normal there is no temperature discrepancy between the legs no signs of active vascular compromise at this time this is not consistent with a presentation of DVT as the patient symptoms are bilateral he has no leg swelling negative Homans' sign no chest pain no shortness of breath patient has no focal deficits he is able to bear weight normally we obtained EKG which is nonischemic labs there are no electrolyte abnormalities at this time patient does not appear to be dehydrated upon reevaluation is asymptomatic at this time stable for discharge advised to follow-up with his PMD in the next 24 to 48 hours advised to have a low threshold for return to the emergency department for any further concerns I had this conversation in the presence of the patient's wife as well

## 2022-07-09 NOTE — ED PROVIDER NOTE - NSICDXPASTSURGICALHX_GEN_ALL_CORE_FT
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

## 2022-07-09 NOTE — ED PROVIDER NOTE - PATIENT PORTAL LINK FT
You can access the FollowMyHealth Patient Portal offered by [facility] by registering at the following website: [email or web address]. By joining [employer]’s FollowMyHealth portal, you will also be able to view your health information using other applications (apps) compatible with our system.

## 2022-07-09 NOTE — ED PROVIDER NOTE - OBJECTIVE STATEMENT
78 yo male hx of HTN/HLD/CAD s/p CABG present c/o b/l leg cramping off/on over the past 2 days. reported it started on his left calf then to his right calf this evening fo he comes to ED for evaluation. denies fall or injury. denies numbness/weakness to b/l LE. pain resolved prior to ED arrival. further denies fever/chill/HA/dizziness/chest pain/palpitation/sob/abd pain/n/v/d/ black stool/bloody stool/urinary sxs

## 2022-07-09 NOTE — ED PROVIDER NOTE - NS ED ATTENDING STATEMENT MOD
This was a shared visit with the ALEXANDRO. I reviewed and verified the documentation and independently performed the documented:

## 2022-07-09 NOTE — ED PROVIDER NOTE - NSFOLLOWUPINSTRUCTIONS_ED_ALL_ED_FT
SEE YOUR DOCTOR IN THE NEXT 24-48 HOURS   RETURN FOR ANY FURTHER CONCERNS     SEEK IMMEDIATE MEDICAL CARE IF YOU HAVE ANY OF THE FOLLOWING SYMPTOMS: worsening shortness of breath, chest pain, back pain, abdominal pain, fever, coughing up blood, lightheadedness/dizziness.

## 2022-07-22 ENCOUNTER — APPOINTMENT (OUTPATIENT)
Dept: CARDIOLOGY | Facility: CLINIC | Age: 77
End: 2022-07-22

## 2022-07-22 VITALS
BODY MASS INDEX: 28.41 KG/M2 | HEIGHT: 67 IN | SYSTOLIC BLOOD PRESSURE: 120 MMHG | DIASTOLIC BLOOD PRESSURE: 72 MMHG | WEIGHT: 181 LBS

## 2022-07-22 PROCEDURE — 99214 OFFICE O/P EST MOD 30 MIN: CPT

## 2022-07-22 NOTE — HISTORY OF PRESENT ILLNESS
[FreeTextEntry1] : Patient  gained 3 pounds. No chest pain . No sob. He gets MCDANIEL.  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 Recent er visit leg cramps. Found dehydrated. No further leg cramps. He got iv

## 2022-07-22 NOTE — DISCUSSION/SUMMARY
[FreeTextEntry1] : The Pt had  CABG 2000. He has TEO not use c pap. He gained 3  lb. Snoring much less. 2/18 stress non diagnostic EKG positive. Chest pain resolved. He is aware he has CAD. Told any more symptoms must go to hospital. He had chest pain every day. Stent CX 2/20. CABG was LIMA vein diagonal. . He got 3 covid vaccine. Neg SE 4/22 mod as. Echo about 4/22. Bloods reviewed with patient and family. Reviewed  AS with patient and family. Questions answered

## 2022-08-29 ENCOUNTER — RX RENEWAL (OUTPATIENT)
Age: 77
End: 2022-08-29

## 2022-10-26 ENCOUNTER — RX RENEWAL (OUTPATIENT)
Age: 77
End: 2022-10-26

## 2022-11-22 ENCOUNTER — APPOINTMENT (OUTPATIENT)
Dept: CARDIOLOGY | Facility: CLINIC | Age: 77
End: 2022-11-22

## 2022-11-22 VITALS
WEIGHT: 184 LBS | HEIGHT: 67 IN | BODY MASS INDEX: 28.88 KG/M2 | DIASTOLIC BLOOD PRESSURE: 72 MMHG | SYSTOLIC BLOOD PRESSURE: 132 MMHG

## 2022-11-22 PROCEDURE — 99214 OFFICE O/P EST MOD 30 MIN: CPT

## 2022-11-22 NOTE — DISCUSSION/SUMMARY
[FreeTextEntry1] : The Pt had  CABG 2000. He has TEO not use c pap. He gained 3  lb. Snoring much less. 2/18 stress non diagnostic EKG positive. Chest pain resolved. He is aware he has CAD. Told any more symptoms must go to hospital. He had chest pain every day. Stent CX 2/20. CABG was LIMA vein diagonal. . He got 3 covid vaccine. Neg SE 4/22 mod as. Echo about 4/23.  Reviewed  AS with patient and family. Questions answered. Reviewed need not gain weight. Bloods reviewed

## 2022-11-22 NOTE — HISTORY OF PRESENT ILLNESS
[FreeTextEntry1] : Patient  gained 3 pounds. No chest pain . No sob. He gets MCDANIEL.  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 Recent er visit leg cramps. Found dehydrated. No further leg cramps. He got iv.

## 2022-12-13 ENCOUNTER — RX RENEWAL (OUTPATIENT)
Age: 77
End: 2022-12-13

## 2023-02-24 ENCOUNTER — APPOINTMENT (OUTPATIENT)
Dept: CARDIOLOGY | Facility: CLINIC | Age: 78
End: 2023-02-24
Payer: MEDICARE

## 2023-02-24 ENCOUNTER — NON-APPOINTMENT (OUTPATIENT)
Age: 78
End: 2023-02-24

## 2023-02-24 VITALS
WEIGHT: 181 LBS | SYSTOLIC BLOOD PRESSURE: 112 MMHG | BODY MASS INDEX: 28.41 KG/M2 | HEIGHT: 67 IN | DIASTOLIC BLOOD PRESSURE: 70 MMHG

## 2023-02-24 DIAGNOSIS — G47.33 OBSTRUCTIVE SLEEP APNEA (ADULT) (PEDIATRIC): ICD-10-CM

## 2023-02-24 PROCEDURE — 99214 OFFICE O/P EST MOD 30 MIN: CPT

## 2023-02-24 NOTE — DISCUSSION/SUMMARY
[FreeTextEntry1] : The Pt had  CABG 2000. He has TEO not use c pap. He gained 3  lb. Snoring much less. 2/18 stress non diagnostic EKG positive. Chest pain resolved. He is aware he has CAD. Told any more symptoms must go to hospital. He had chest pain every day. Stent CX 2/20. CABG was LIMA vein diagonal. . He got 3 covid vaccine. Neg SE 4/22 mod as.  Reviewed  AS with patient and family. Questions answered. Told maintain weight. . SE or echo soon.

## 2023-02-24 NOTE — HISTORY OF PRESENT ILLNESS
[FreeTextEntry1] : Patient  lost 3 pounds. No chest pain . No sob. He gets MCDANIEL.  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 Memory may be decreasing

## 2023-04-05 ENCOUNTER — RX RENEWAL (OUTPATIENT)
Age: 78
End: 2023-04-05

## 2023-04-20 ENCOUNTER — APPOINTMENT (OUTPATIENT)
Dept: CV DIAGNOSITCS | Facility: HOSPITAL | Age: 78
End: 2023-04-20

## 2023-05-25 ENCOUNTER — RX RENEWAL (OUTPATIENT)
Age: 78
End: 2023-05-25

## 2023-08-01 ENCOUNTER — APPOINTMENT (OUTPATIENT)
Dept: CARDIOLOGY | Facility: CLINIC | Age: 78
End: 2023-08-01
Payer: MEDICARE

## 2023-08-01 VITALS
WEIGHT: 178 LBS | BODY MASS INDEX: 27.94 KG/M2 | SYSTOLIC BLOOD PRESSURE: 132 MMHG | DIASTOLIC BLOOD PRESSURE: 68 MMHG | HEIGHT: 67 IN

## 2023-08-01 DIAGNOSIS — I25.10 ATHEROSCLEROTIC HEART DISEASE OF NATIVE CORONARY ARTERY W/OUT ANGINA PECTORIS: ICD-10-CM

## 2023-08-01 DIAGNOSIS — I34.0 NONRHEUMATIC MITRAL (VALVE) INSUFFICIENCY: ICD-10-CM

## 2023-08-01 PROCEDURE — 99213 OFFICE O/P EST LOW 20 MIN: CPT

## 2023-08-01 NOTE — DISCUSSION/SUMMARY
[FreeTextEntry1] : The Pt had  CABG 2000. He has TEO not use c pap. He gained 3  lb. Snoring much less. 2/18 stress non diagnostic EKG positive. Chest pain resolved. He is aware he has CAD. Told any more symptoms must go to hospital. He had chest pain every day. Stent CX 2/20. CABG was LIMA vein diagonal. . He got 3 covid vaccine. Neg SE 4/22 mod as.  Reviewed AS with patient and family. Questions answered. Told continue Wt loss. SE or echo soon.

## 2023-08-01 NOTE — HISTORY OF PRESENT ILLNESS
[FreeTextEntry1] : Patient lost 3 pounds. No chest pain . No sob. He gets MCDANIEL.  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 Memory stable.

## 2023-08-01 NOTE — PHYSICAL EXAM
[Well Developed] : well developed [Well Nourished] : well nourished [Normal Venous Pressure] : normal venous pressure [No Carotid Bruit] : no carotid bruit [Normal Rate] : normal [Rhythm Regular] : regular [Normal S1] : normal S1 [Normal S2] : normal S2 [II] : a grade 2 [Clear Lung Fields] : clear lung fields [Soft] : abdomen soft [Normal Gait] : normal gait [No Edema] : no edema [No Rash] : no rash [Moves all extremities] : moves all extremities [S3] : no S3 [S4] : no S4

## 2023-08-28 ENCOUNTER — RX RENEWAL (OUTPATIENT)
Age: 78
End: 2023-08-28

## 2023-10-10 ENCOUNTER — OUTPATIENT (OUTPATIENT)
Dept: OUTPATIENT SERVICES | Facility: HOSPITAL | Age: 78
LOS: 1 days | End: 2023-10-10
Payer: MEDICARE

## 2023-10-10 ENCOUNTER — APPOINTMENT (OUTPATIENT)
Dept: CV DIAGNOSTICS | Facility: HOSPITAL | Age: 78
End: 2023-10-10
Payer: MEDICARE

## 2023-10-10 DIAGNOSIS — I35.1 NONRHEUMATIC AORTIC (VALVE) INSUFFICIENCY: ICD-10-CM

## 2023-10-10 DIAGNOSIS — I10 ESSENTIAL (PRIMARY) HYPERTENSION: ICD-10-CM

## 2023-10-10 DIAGNOSIS — I20.9 ANGINA PECTORIS, UNSPECIFIED: ICD-10-CM

## 2023-10-10 DIAGNOSIS — I25.701 ATHEROSCLEROSIS OF CORONARY ARTERY BYPASS GRAFT(S), UNSPECIFIED, WITH ANGINA PECTORIS WITH DOCUMENTED SPASM: Chronic | ICD-10-CM

## 2023-10-10 DIAGNOSIS — I25.810 ATHEROSCLEROSIS OF CORONARY ARTERY BYPASS GRAFT(S) WITHOUT ANGINA PECTORIS: Chronic | ICD-10-CM

## 2023-10-10 PROCEDURE — 93351 STRESS TTE COMPLETE: CPT

## 2023-10-10 PROCEDURE — 93320 DOPPLER ECHO COMPLETE: CPT

## 2023-10-10 PROCEDURE — 93351 STRESS TTE COMPLETE: CPT | Mod: 26

## 2023-10-10 PROCEDURE — 93325 DOPPLER ECHO COLOR FLOW MAPG: CPT

## 2023-10-11 DIAGNOSIS — I20.9 ANGINA PECTORIS, UNSPECIFIED: ICD-10-CM

## 2023-10-11 DIAGNOSIS — I35.1 NONRHEUMATIC AORTIC (VALVE) INSUFFICIENCY: ICD-10-CM

## 2023-10-29 ENCOUNTER — RX RENEWAL (OUTPATIENT)
Age: 78
End: 2023-10-29

## 2023-12-20 ENCOUNTER — RX RENEWAL (OUTPATIENT)
Age: 78
End: 2023-12-20

## 2023-12-28 ENCOUNTER — APPOINTMENT (OUTPATIENT)
Dept: CARDIOLOGY | Facility: CLINIC | Age: 78
End: 2023-12-28
Payer: MEDICARE

## 2023-12-28 VITALS
OXYGEN SATURATION: 97 % | HEIGHT: 67 IN | DIASTOLIC BLOOD PRESSURE: 68 MMHG | BODY MASS INDEX: 27.78 KG/M2 | SYSTOLIC BLOOD PRESSURE: 120 MMHG | WEIGHT: 177 LBS | HEART RATE: 60 BPM

## 2023-12-28 DIAGNOSIS — E78.5 HYPERLIPIDEMIA, UNSPECIFIED: ICD-10-CM

## 2023-12-28 DIAGNOSIS — I35.1 NONRHEUMATIC AORTIC (VALVE) INSUFFICIENCY: ICD-10-CM

## 2023-12-28 DIAGNOSIS — I10 ESSENTIAL (PRIMARY) HYPERTENSION: ICD-10-CM

## 2023-12-28 PROCEDURE — 99213 OFFICE O/P EST LOW 20 MIN: CPT

## 2023-12-28 NOTE — DISCUSSION/SUMMARY
[FreeTextEntry1] : The Pt had  CABG 2000. He has TEO not use c pap. He lost 1 . Snoring much less. 2/18 stress non diagnostic EKG positive. Chest pain resolved. He is aware he has CAD. Told any more symptoms must go to hospital. He had chest pain every day. Stent CX 2/20. CABG was LIMA vein diagonal. . He got 3 covid vaccine. Neg SE 10/23  mod as.  Reviewed AS with patient and family. Questions answered. Told continue Wt loss. SE or echo about 10/24. Told keep active

## 2023-12-28 NOTE — HISTORY OF PRESENT ILLNESS
[FreeTextEntry1] : Patient lost 1llb.. No chest pain . No sob. He gets MCDANIEL.  But better. 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 Memory stable.

## 2024-02-29 ENCOUNTER — APPOINTMENT (OUTPATIENT)
Dept: UROLOGY | Facility: CLINIC | Age: 79
End: 2024-02-29
Payer: MEDICARE

## 2024-02-29 DIAGNOSIS — N40.1 BENIGN PROSTATIC HYPERPLASIA WITH LOWER URINARY TRACT SYMPMS: ICD-10-CM

## 2024-02-29 DIAGNOSIS — R68.89 OTHER GENERAL SYMPTOMS AND SIGNS: ICD-10-CM

## 2024-02-29 DIAGNOSIS — N13.8 BENIGN PROSTATIC HYPERPLASIA WITH LOWER URINARY TRACT SYMPMS: ICD-10-CM

## 2024-02-29 DIAGNOSIS — R97.20 ELEVATED PROSTATE, SPECIFIC ANTIGEN [PSA]: ICD-10-CM

## 2024-02-29 PROCEDURE — 99203 OFFICE O/P NEW LOW 30 MIN: CPT

## 2024-02-29 NOTE — HISTORY OF PRESENT ILLNESS
[FreeTextEntry1] : Shahriar is a 78-year-old who presents to office accompanied by his wife.  He has history of BPH and last seen in 2020.  He continues to report no clinically significant or bothersome urinary symptoms.  He denies any dysuria and gross hematuria.  Visit conducted in both Yakut and English. He has cardiac comorbidities and is followed closely by cardiologist. He presents to office today after recent PSA with medical doctor return is 9.3 ng/mL.  All previous and present data reviewed:   2021 patient's PSA was 3.59 ng/mL. 1/2024 psa [ pcp] = 9.3 // %fpsa = 6   [Urinary Frequency] : urinary frequency [Weak Stream] : no weak stream [Nocturia] : nocturia [None] : None [Hematuria - Gross] : no gross hematuria

## 2024-02-29 NOTE — ASSESSMENT
[FreeTextEntry1] :  1.Elevated PSA.  9.3 ng/mL. 2.  BPH with hard nodule nodule on right apex. 3.  LUTS -minimal   Plan  Explained in detail the patient concern for underlying prostate cancer. Patient needs MRI of prostate as well as transperineal prostate biopsy. Biopsy will be MRI guided based off of results from MRI. Patient is on Plavix and understands that we will need medical and cardiological clearance for biopsy. Risks of biopsy reviewed in detail.  Patient is wife verbalized understanding. Follow-up for MRI and transperineal biopsy of prostate. Dr. Marks brought into room today for consultation. -MP MRI prostate ****followed by MRI fusion transperineal prostate biopsy -MRI fusion transperineal prostate biopsy/south/ambulatory/general anesthesia --both wife and patient agree  informed consent obtained including not limiting to bleeding, pain, sepsis, UTI, complications, expectations, success, failure, abscess,  postbiopsy course, risks, antibiotic prophylaxis, indications, methodology was explained and communicated to the patient who apparently understands it, urinary retention,  Blood in urine, blood in semen sometimes for a couple of weeks.

## 2024-02-29 NOTE — PHYSICAL EXAM
[General Appearance - In No Acute Distress] : no acute distress [Normal Station and Gait] : the gait and station were normal for the patient's age [Prostate Size ___ gm] : prostate size [unfilled] gm [Oriented To Time, Place, And Person] : oriented to person, place, and time [No Focal Deficits] : no focal deficits [Testes Tenderness] : no tenderness of the testes [Edema] : no peripheral edema [Skin Color & Pigmentation] : normal skin color and pigmentation [Prostate Tenderness] : the prostate was not tender [] : no rash [No Palpable Adenopathy] : no palpable adenopathy [de-identified] : Nodule palpated on right apex.. hard

## 2024-02-29 NOTE — REVIEW OF SYSTEMS
[see HPI] : see HPI [Fever] : no fever [Chest Pain] : no chest pain [Chills] : no chills [Abdominal Pain] : no abdominal pain [Shortness Of Breath] : no shortness of breath [Vomiting] : no vomiting

## 2024-02-29 NOTE — END OF VISIT
[FreeTextEntry3] : Pt seen ,evaluated ,examined  by me with PA/ RN present and agree to findings , plan [Time Spent: ___ minutes] : I have spent [unfilled] minutes of time on the encounter. [4. Prevent psychological harm to patient or others] : Reason - Prevent psychological harm to patient or others

## 2024-03-08 ENCOUNTER — OUTPATIENT (OUTPATIENT)
Dept: OUTPATIENT SERVICES | Facility: HOSPITAL | Age: 79
LOS: 1 days | End: 2024-03-08
Payer: MEDICARE

## 2024-03-08 ENCOUNTER — RESULT REVIEW (OUTPATIENT)
Age: 79
End: 2024-03-08

## 2024-03-08 DIAGNOSIS — I25.701 ATHEROSCLEROSIS OF CORONARY ARTERY BYPASS GRAFT(S), UNSPECIFIED, WITH ANGINA PECTORIS WITH DOCUMENTED SPASM: Chronic | ICD-10-CM

## 2024-03-08 DIAGNOSIS — R97.20 ELEVATED PROSTATE SPECIFIC ANTIGEN [PSA]: ICD-10-CM

## 2024-03-08 DIAGNOSIS — Z00.8 ENCOUNTER FOR OTHER GENERAL EXAMINATION: ICD-10-CM

## 2024-03-08 DIAGNOSIS — I25.810 ATHEROSCLEROSIS OF CORONARY ARTERY BYPASS GRAFT(S) WITHOUT ANGINA PECTORIS: Chronic | ICD-10-CM

## 2024-03-08 PROCEDURE — 72197 MRI PELVIS W/O & W/DYE: CPT | Mod: 26,MH

## 2024-03-08 PROCEDURE — A9579: CPT

## 2024-03-08 PROCEDURE — 72197 MRI PELVIS W/O & W/DYE: CPT

## 2024-03-09 DIAGNOSIS — R97.20 ELEVATED PROSTATE SPECIFIC ANTIGEN [PSA]: ICD-10-CM

## 2024-03-12 ENCOUNTER — RESULT REVIEW (OUTPATIENT)
Age: 79
End: 2024-03-12

## 2024-03-12 ENCOUNTER — OUTPATIENT (OUTPATIENT)
Dept: OUTPATIENT SERVICES | Facility: HOSPITAL | Age: 79
LOS: 1 days | End: 2024-03-12
Payer: MEDICARE

## 2024-03-12 DIAGNOSIS — I25.810 ATHEROSCLEROSIS OF CORONARY ARTERY BYPASS GRAFT(S) WITHOUT ANGINA PECTORIS: Chronic | ICD-10-CM

## 2024-03-12 DIAGNOSIS — N42.89 OTHER SPECIFIED DISORDERS OF PROSTATE: ICD-10-CM

## 2024-03-12 DIAGNOSIS — I25.701 ATHEROSCLEROSIS OF CORONARY ARTERY BYPASS GRAFT(S), UNSPECIFIED, WITH ANGINA PECTORIS WITH DOCUMENTED SPASM: Chronic | ICD-10-CM

## 2024-03-12 PROCEDURE — 76377 3D RENDER W/INTRP POSTPROCES: CPT

## 2024-03-12 PROCEDURE — 76377 3D RENDER W/INTRP POSTPROCES: CPT | Mod: 26

## 2024-03-13 DIAGNOSIS — N42.89 OTHER SPECIFIED DISORDERS OF PROSTATE: ICD-10-CM

## 2024-03-18 ENCOUNTER — OUTPATIENT (OUTPATIENT)
Dept: OUTPATIENT SERVICES | Facility: HOSPITAL | Age: 79
LOS: 1 days | End: 2024-03-18
Payer: MEDICARE

## 2024-03-18 ENCOUNTER — RESULT REVIEW (OUTPATIENT)
Age: 79
End: 2024-03-18

## 2024-03-18 VITALS
WEIGHT: 175.05 LBS | TEMPERATURE: 98 F | OXYGEN SATURATION: 100 % | RESPIRATION RATE: 14 BRPM | SYSTOLIC BLOOD PRESSURE: 140 MMHG | HEART RATE: 63 BPM | DIASTOLIC BLOOD PRESSURE: 80 MMHG | HEIGHT: 67 IN

## 2024-03-18 DIAGNOSIS — I25.701 ATHEROSCLEROSIS OF CORONARY ARTERY BYPASS GRAFT(S), UNSPECIFIED, WITH ANGINA PECTORIS WITH DOCUMENTED SPASM: Chronic | ICD-10-CM

## 2024-03-18 DIAGNOSIS — Z01.818 ENCOUNTER FOR OTHER PREPROCEDURAL EXAMINATION: ICD-10-CM

## 2024-03-18 DIAGNOSIS — R97.20 ELEVATED PROSTATE SPECIFIC ANTIGEN [PSA]: ICD-10-CM

## 2024-03-18 DIAGNOSIS — I25.810 ATHEROSCLEROSIS OF CORONARY ARTERY BYPASS GRAFT(S) WITHOUT ANGINA PECTORIS: Chronic | ICD-10-CM

## 2024-03-18 LAB
ALBUMIN SERPL ELPH-MCNC: 4.8 G/DL — SIGNIFICANT CHANGE UP (ref 3.5–5.2)
ALP SERPL-CCNC: 64 U/L — SIGNIFICANT CHANGE UP (ref 30–115)
ALT FLD-CCNC: 19 U/L — SIGNIFICANT CHANGE UP (ref 0–41)
ANION GAP SERPL CALC-SCNC: 11 MMOL/L — SIGNIFICANT CHANGE UP (ref 7–14)
APPEARANCE UR: CLEAR — SIGNIFICANT CHANGE UP
APTT BLD: 30 SEC — SIGNIFICANT CHANGE UP (ref 27–39.2)
AST SERPL-CCNC: 21 U/L — SIGNIFICANT CHANGE UP (ref 0–41)
BASOPHILS # BLD AUTO: 0.05 K/UL — SIGNIFICANT CHANGE UP (ref 0–0.2)
BASOPHILS NFR BLD AUTO: 0.6 % — SIGNIFICANT CHANGE UP (ref 0–1)
BILIRUB SERPL-MCNC: 0.6 MG/DL — SIGNIFICANT CHANGE UP (ref 0.2–1.2)
BILIRUB UR-MCNC: NEGATIVE — SIGNIFICANT CHANGE UP
BUN SERPL-MCNC: 17 MG/DL — SIGNIFICANT CHANGE UP (ref 10–20)
CALCIUM SERPL-MCNC: 9.8 MG/DL — SIGNIFICANT CHANGE UP (ref 8.4–10.5)
CHLORIDE SERPL-SCNC: 102 MMOL/L — SIGNIFICANT CHANGE UP (ref 98–110)
CO2 SERPL-SCNC: 27 MMOL/L — SIGNIFICANT CHANGE UP (ref 17–32)
COLOR SPEC: YELLOW — SIGNIFICANT CHANGE UP
CREAT SERPL-MCNC: 1 MG/DL — SIGNIFICANT CHANGE UP (ref 0.7–1.5)
DIFF PNL FLD: NEGATIVE — SIGNIFICANT CHANGE UP
EGFR: 77 ML/MIN/1.73M2 — SIGNIFICANT CHANGE UP
EOSINOPHIL # BLD AUTO: 0.12 K/UL — SIGNIFICANT CHANGE UP (ref 0–0.7)
EOSINOPHIL NFR BLD AUTO: 1.5 % — SIGNIFICANT CHANGE UP (ref 0–8)
GLUCOSE SERPL-MCNC: 93 MG/DL — SIGNIFICANT CHANGE UP (ref 70–99)
GLUCOSE UR QL: NEGATIVE MG/DL — SIGNIFICANT CHANGE UP
HCT VFR BLD CALC: 40.7 % — LOW (ref 42–52)
HGB BLD-MCNC: 13.5 G/DL — LOW (ref 14–18)
IMM GRANULOCYTES NFR BLD AUTO: 0.4 % — HIGH (ref 0.1–0.3)
INR BLD: 0.97 RATIO — SIGNIFICANT CHANGE UP (ref 0.65–1.3)
KETONES UR-MCNC: NEGATIVE MG/DL — SIGNIFICANT CHANGE UP
LEUKOCYTE ESTERASE UR-ACNC: NEGATIVE — SIGNIFICANT CHANGE UP
LYMPHOCYTES # BLD AUTO: 2.39 K/UL — SIGNIFICANT CHANGE UP (ref 1.2–3.4)
LYMPHOCYTES # BLD AUTO: 30.4 % — SIGNIFICANT CHANGE UP (ref 20.5–51.1)
MCHC RBC-ENTMCNC: 29.7 PG — SIGNIFICANT CHANGE UP (ref 27–31)
MCHC RBC-ENTMCNC: 33.2 G/DL — SIGNIFICANT CHANGE UP (ref 32–37)
MCV RBC AUTO: 89.6 FL — SIGNIFICANT CHANGE UP (ref 80–94)
MONOCYTES # BLD AUTO: 0.45 K/UL — SIGNIFICANT CHANGE UP (ref 0.1–0.6)
MONOCYTES NFR BLD AUTO: 5.7 % — SIGNIFICANT CHANGE UP (ref 1.7–9.3)
NEUTROPHILS # BLD AUTO: 4.81 K/UL — SIGNIFICANT CHANGE UP (ref 1.4–6.5)
NEUTROPHILS NFR BLD AUTO: 61.4 % — SIGNIFICANT CHANGE UP (ref 42.2–75.2)
NITRITE UR-MCNC: NEGATIVE — SIGNIFICANT CHANGE UP
NRBC # BLD: 0 /100 WBCS — SIGNIFICANT CHANGE UP (ref 0–0)
PH UR: 7 — SIGNIFICANT CHANGE UP (ref 5–8)
PLATELET # BLD AUTO: 169 K/UL — SIGNIFICANT CHANGE UP (ref 130–400)
PMV BLD: 10.9 FL — HIGH (ref 7.4–10.4)
POTASSIUM SERPL-MCNC: 4.4 MMOL/L — SIGNIFICANT CHANGE UP (ref 3.5–5)
POTASSIUM SERPL-SCNC: 4.4 MMOL/L — SIGNIFICANT CHANGE UP (ref 3.5–5)
PROT SERPL-MCNC: 7.4 G/DL — SIGNIFICANT CHANGE UP (ref 6–8)
PROT UR-MCNC: NEGATIVE MG/DL — SIGNIFICANT CHANGE UP
PROTHROM AB SERPL-ACNC: 11.1 SEC — SIGNIFICANT CHANGE UP (ref 9.95–12.87)
RBC # BLD: 4.54 M/UL — LOW (ref 4.7–6.1)
RBC # FLD: 12.8 % — SIGNIFICANT CHANGE UP (ref 11.5–14.5)
SODIUM SERPL-SCNC: 140 MMOL/L — SIGNIFICANT CHANGE UP (ref 135–146)
SP GR SPEC: 1.01 — SIGNIFICANT CHANGE UP (ref 1–1.03)
UROBILINOGEN FLD QL: 0.2 MG/DL — SIGNIFICANT CHANGE UP (ref 0.2–1)
WBC # BLD: 7.85 K/UL — SIGNIFICANT CHANGE UP (ref 4.8–10.8)
WBC # FLD AUTO: 7.85 K/UL — SIGNIFICANT CHANGE UP (ref 4.8–10.8)

## 2024-03-18 PROCEDURE — 85610 PROTHROMBIN TIME: CPT

## 2024-03-18 PROCEDURE — 85730 THROMBOPLASTIN TIME PARTIAL: CPT

## 2024-03-18 PROCEDURE — 93010 ELECTROCARDIOGRAM REPORT: CPT

## 2024-03-18 PROCEDURE — 71046 X-RAY EXAM CHEST 2 VIEWS: CPT

## 2024-03-18 PROCEDURE — 71046 X-RAY EXAM CHEST 2 VIEWS: CPT | Mod: 26

## 2024-03-18 PROCEDURE — 36415 COLL VENOUS BLD VENIPUNCTURE: CPT

## 2024-03-18 PROCEDURE — 85025 COMPLETE CBC W/AUTO DIFF WBC: CPT

## 2024-03-18 PROCEDURE — 87086 URINE CULTURE/COLONY COUNT: CPT

## 2024-03-18 PROCEDURE — 99214 OFFICE O/P EST MOD 30 MIN: CPT | Mod: 25

## 2024-03-18 PROCEDURE — 80053 COMPREHEN METABOLIC PANEL: CPT

## 2024-03-18 PROCEDURE — 81003 URINALYSIS AUTO W/O SCOPE: CPT

## 2024-03-18 PROCEDURE — 93005 ELECTROCARDIOGRAM TRACING: CPT

## 2024-03-18 NOTE — H&P PST ADULT - HISTORY OF PRESENT ILLNESS
78-year-old male with a history of BPH who presented to Urology with clinical significant urinary symptoms. Found to have elevated PSA. Now scheduled for MRI fusion transperineal prostate biopsy on 4/1/2024 under general anesthesia with Dr. Marks in OR [facility].    Denies any chest pain, difficulty breathing, SOB, palpitations, dysuria, URI, or any other infections in the last 2 weeks/1 month. Denies any recent travel, contact, or exposure to any persons with known or suspected COVID-19. Pt also denies COVID testing within the last 2 weeks. Pt admits to receiving all doses of  COVID vaccine. Denies any suicidal or homicidal ideations. Pt advised to self quarantine until day of procedure. Exercise tolerance of 4-5 flights of stairs without dyspnea. TEO reviewed with patient. Pt verbalized understanding of all pre-operative instructions.    Anesthesia Alert  NO--Difficult Airway CLASS II  NO--History of neck surgery or radiation  NO--Limited ROM of neck  NO--History of Malignant hyperthermia  NO--No personal or family history of Pseudocholinesterase deficiency.  NO--Prior Anesthesia Complication  NO--Latex Allergy  NO--Loose teeth  NO--History of Rheumatoid Arthritis  YES--TEO on CPAP ( non compliant)   YES--Bleeding Risk on Plavix, Kath  NO--Other_____    Duke Activity Status Index (DASI) from [employer]  on 3/18/2024  ** All calculations should be rechecked by clinician prior to use **    RESULT SUMMARY:  44.7 points  The higher the score (maximum 58.2), the higher the functional status.    8.23 METs        INPUTS:  Take care of self —> 2.75 = Yes  Walk indoors —> 1.75 = Yes  Walk 1&ndash;2 blocks on level ground —> 2.75 = Yes  Climb a flight of stairs or walk up a hill —> 5.5 = Yes  Run a short distance —> 8 = Yes  Do light work around the house —> 2.7 = Yes  Do moderate work around the house —> 3.5 = Yes  Do heavy work around the house —> 8 = Yes  Do yardwork —> 4.5 = Yes  Have sexual relations —> 5.25 = Yes  Participate in moderate recreational activities —> 0 = No  Participate in strenuous sports —> 0 = No    Revised Cardiac Risk Index for Pre-Operative Risk from [employer]  on 3/18/2024  ** All calculations should be rechecked by clinician prior to use **    RESULT SUMMARY:  2 points  Class III Risk    10.1 %  30-day risk of death, MI, or cardiac arrest    From Duceppe 2017. These numbers are higher than those from the original study (Johan 1999). See Evidence for details.      INPUTS:  Elevated-risk surgery —> 1 = Yes  History of ischemic heart disease —> 1 = Yes  History of congestive heart failure —> 0 = No  History of cerebrovascular disease —> 0 = No  Pre-operative treatment with insulin —> 0 = No  Pre-operative creatinine >2 mg/dL / 176.8 µmol/L —> 0 = No

## 2024-03-18 NOTE — H&P PST ADULT - ATTENDING COMMENTS
For transperineal fusion biopsy prostate.  risks, benefits, alternatives discussed including pain, infection, bleeding, urinary retentio.  pt stopped plavix in preparation.

## 2024-03-18 NOTE — H&P PST ADULT - REASON FOR ADMISSION
77 y/o male presents for PAST in preparation for MRI fusion transperineal prostate biopsy on 4/1/2024 under general anesthesia with Dr. Marks in [facility].

## 2024-03-18 NOTE — H&P PST ADULT - NSICDXPASTMEDICALHX_GEN_ALL_CORE_FT
PAST MEDICAL HISTORY:  Arteriosclerosis of coronary artery in patient with history of myocardial infarction     CAD (coronary artery disease)     Elevated PSA     High blood pressure disorder     Hyperlipidemia     Mitral regurgitation

## 2024-03-19 DIAGNOSIS — Z01.818 ENCOUNTER FOR OTHER PREPROCEDURAL EXAMINATION: ICD-10-CM

## 2024-03-19 DIAGNOSIS — R97.20 ELEVATED PROSTATE SPECIFIC ANTIGEN [PSA]: ICD-10-CM

## 2024-03-19 LAB
CULTURE RESULTS: SIGNIFICANT CHANGE UP
SPECIMEN SOURCE: SIGNIFICANT CHANGE UP

## 2024-03-21 ENCOUNTER — RX RENEWAL (OUTPATIENT)
Age: 79
End: 2024-03-21

## 2024-03-22 ENCOUNTER — APPOINTMENT (OUTPATIENT)
Dept: CARDIOLOGY | Facility: CLINIC | Age: 79
End: 2024-03-22
Payer: MEDICARE

## 2024-03-22 VITALS
DIASTOLIC BLOOD PRESSURE: 64 MMHG | HEIGHT: 67 IN | SYSTOLIC BLOOD PRESSURE: 120 MMHG | BODY MASS INDEX: 27.53 KG/M2 | WEIGHT: 175.4 LBS | OXYGEN SATURATION: 99 % | HEART RATE: 57 BPM

## 2024-03-22 DIAGNOSIS — Z23 ENCOUNTER FOR IMMUNIZATION: ICD-10-CM

## 2024-03-22 DIAGNOSIS — I35.0 NONRHEUMATIC AORTIC (VALVE) STENOSIS: ICD-10-CM

## 2024-03-22 DIAGNOSIS — I20.9 ANGINA PECTORIS, UNSPECIFIED: ICD-10-CM

## 2024-03-22 PROCEDURE — 99214 OFFICE O/P EST MOD 30 MIN: CPT

## 2024-03-22 RX ORDER — HYDROCHLOROTHIAZIDE 25 MG/1
25 TABLET ORAL
Qty: 90 | Refills: 3 | Status: ACTIVE | COMMUNITY
Start: 2022-08-29

## 2024-03-22 RX ORDER — AMLODIPINE BESYLATE 5 MG/1
5 TABLET ORAL DAILY
Qty: 90 | Refills: 3 | Status: ACTIVE | COMMUNITY
Start: 2022-10-26

## 2024-03-22 RX ORDER — METOPROLOL SUCCINATE 50 MG/1
50 TABLET, EXTENDED RELEASE ORAL
Qty: 135 | Refills: 3 | Status: ACTIVE | COMMUNITY

## 2024-03-22 RX ORDER — EZETIMIBE AND SIMVASTATIN 10; 10 MG/1; MG/1
10-10 TABLET ORAL DAILY
Qty: 90 | Refills: 3 | Status: ACTIVE | COMMUNITY
Start: 2022-12-13

## 2024-03-22 NOTE — REVIEW OF SYSTEMS
[Hearing Loss] : hearing loss [Fever] : no fever [Blurry Vision] : no blurred vision [Chest Discomfort] : no chest discomfort [SOB] : no shortness of breath [Cough] : no cough [Palpitations] : no palpitations [Wheezing] : no wheezing [Abdominal Pain] : no abdominal pain [Dysphagia] : no dysphagia [Constipation] : no constipation [Joint Pain] : no joint pain [Pain During Urination] : no dysuria [Joint Stiffness] : no joint stiffness [Myalgia] : no myalgia [Rash] : no rash [Skin Lesions] : no skin lesions [Dizziness] : no dizziness [Easy Bleeding] : no tendency for easy bleeding [Confusion] : no confusion was observed

## 2024-03-22 NOTE — DISCUSSION/SUMMARY
[Procedure Low Risk] : the procedure risk is low [Optimized for Surgery] : the patient is optimized for surgery [Patient Intermediate Risk] : the patient is an intermediate risk [FreeTextEntry1] : The Pt had  CABG 2000. He has TEO not use c pap. He lost 2  Snoring much less. 2/18 stress non diagnostic EKG positive. Chest pain resolved. He is aware he has CAD. Told any more symptoms must go to hospital. He had chest pain every day. Stent CX 2/20. CABG was LIMA vein diagonal. .  Neg SE 10/23  mod as.  Reviewed AS with patient and family. Questions answered. Told continue Wt loss. SE or echo about 10/24. Told keep active . He needs prostate biopsy. Cardiac risk low to intermediate. Dante mod AS Reviewed instructions re meds. EKG reviewed

## 2024-03-22 NOTE — HISTORY OF PRESENT ILLNESS
[Preoperative Visit] : for a medical evaluation prior to surgery [Good] : Good [Prior Anesthesia] : Prior anesthesia [Fever] : no fever [Chest Pain] : no chest pain [Chills] : no chills [Dysuria] : no dysuria [Prev Anesthesia Reaction] : no previous anesthesia reaction [FreeTextEntry1] : Patient lost 2 llb.. No chest pain. No sob. He gets MCDANIEL.  stable. active.  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 Memory stable.

## 2024-03-22 NOTE — PHYSICAL EXAM
[Well Developed] : well developed [Well Nourished] : well nourished [Normal Venous Pressure] : normal venous pressure [Normal Rate] : normal [No Carotid Bruit] : no carotid bruit [Rhythm Regular] : regular [Normal S1] : normal S1 [Normal S2] : normal S2 [II] : a grade 2 [Soft] : abdomen soft [Clear Lung Fields] : clear lung fields [No Edema] : no edema [Normal Gait] : normal gait [No Rash] : no rash [Moves all extremities] : moves all extremities [S3] : no S3 [S4] : no S4

## 2024-04-01 ENCOUNTER — TRANSCRIPTION ENCOUNTER (OUTPATIENT)
Age: 79
End: 2024-04-01

## 2024-04-01 ENCOUNTER — RESULT REVIEW (OUTPATIENT)
Age: 79
End: 2024-04-01

## 2024-04-01 ENCOUNTER — OUTPATIENT (OUTPATIENT)
Dept: OUTPATIENT SERVICES | Facility: HOSPITAL | Age: 79
LOS: 1 days | Discharge: HOME | End: 2024-04-01
Payer: MEDICARE

## 2024-04-01 ENCOUNTER — RX RENEWAL (OUTPATIENT)
Age: 79
End: 2024-04-01

## 2024-04-01 ENCOUNTER — APPOINTMENT (OUTPATIENT)
Dept: UROLOGY | Facility: HOSPITAL | Age: 79
End: 2024-04-01

## 2024-04-01 VITALS — HEART RATE: 65 BPM | SYSTOLIC BLOOD PRESSURE: 138 MMHG | DIASTOLIC BLOOD PRESSURE: 61 MMHG | RESPIRATION RATE: 17 BRPM

## 2024-04-01 VITALS
DIASTOLIC BLOOD PRESSURE: 71 MMHG | SYSTOLIC BLOOD PRESSURE: 150 MMHG | OXYGEN SATURATION: 99 % | RESPIRATION RATE: 19 BRPM | HEART RATE: 58 BPM | HEIGHT: 67 IN | WEIGHT: 175.05 LBS | TEMPERATURE: 96 F

## 2024-04-01 DIAGNOSIS — I25.2 OLD MYOCARDIAL INFARCTION: ICD-10-CM

## 2024-04-01 DIAGNOSIS — R97.20 ELEVATED PROSTATE SPECIFIC ANTIGEN [PSA]: ICD-10-CM

## 2024-04-01 DIAGNOSIS — E78.5 HYPERLIPIDEMIA, UNSPECIFIED: ICD-10-CM

## 2024-04-01 DIAGNOSIS — I25.701 ATHEROSCLEROSIS OF CORONARY ARTERY BYPASS GRAFT(S), UNSPECIFIED, WITH ANGINA PECTORIS WITH DOCUMENTED SPASM: Chronic | ICD-10-CM

## 2024-04-01 DIAGNOSIS — I10 ESSENTIAL (PRIMARY) HYPERTENSION: ICD-10-CM

## 2024-04-01 DIAGNOSIS — G47.33 OBSTRUCTIVE SLEEP APNEA (ADULT) (PEDIATRIC): ICD-10-CM

## 2024-04-01 DIAGNOSIS — I25.810 ATHEROSCLEROSIS OF CORONARY ARTERY BYPASS GRAFT(S) WITHOUT ANGINA PECTORIS: Chronic | ICD-10-CM

## 2024-04-01 DIAGNOSIS — C61 MALIGNANT NEOPLASM OF PROSTATE: ICD-10-CM

## 2024-04-01 DIAGNOSIS — Z79.82 LONG TERM (CURRENT) USE OF ASPIRIN: ICD-10-CM

## 2024-04-01 DIAGNOSIS — I25.10 ATHEROSCLEROTIC HEART DISEASE OF NATIVE CORONARY ARTERY WITHOUT ANGINA PECTORIS: ICD-10-CM

## 2024-04-01 DIAGNOSIS — Z98.890 OTHER SPECIFIED POSTPROCEDURAL STATES: Chronic | ICD-10-CM

## 2024-04-01 PROCEDURE — G0416: CPT

## 2024-04-01 PROCEDURE — 93975 VASCULAR STUDY: CPT | Mod: 26

## 2024-04-01 PROCEDURE — G0416: CPT | Mod: 26

## 2024-04-01 PROCEDURE — 55706 BX PRST8 NDL SAT SAMPLING: CPT

## 2024-04-01 PROCEDURE — 76999F: CUSTOM | Mod: 26

## 2024-04-01 RX ORDER — HYDROMORPHONE HYDROCHLORIDE 2 MG/ML
0.5 INJECTION INTRAMUSCULAR; INTRAVENOUS; SUBCUTANEOUS
Refills: 0 | Status: DISCONTINUED | OUTPATIENT
Start: 2024-04-01 | End: 2024-04-01

## 2024-04-01 RX ORDER — CLOPIDOGREL BISULFATE 75 MG/1
75 TABLET, FILM COATED ORAL
Qty: 0 | Refills: 0 | DISCHARGE

## 2024-04-01 RX ORDER — LOSARTAN POTASSIUM 100 MG/1
100 TABLET, FILM COATED ORAL DAILY
Qty: 90 | Refills: 3 | Status: ACTIVE | COMMUNITY
Start: 2023-04-05 | End: 1900-01-01

## 2024-04-01 RX ORDER — HYDROCHLOROTHIAZIDE 25 MG
25 TABLET ORAL
Qty: 0 | Refills: 0 | DISCHARGE

## 2024-04-01 RX ORDER — ASPIRIN/CALCIUM CARB/MAGNESIUM 324 MG
1 TABLET ORAL
Qty: 0 | Refills: 0 | DISCHARGE

## 2024-04-01 RX ORDER — SIMVASTATIN 20 MG/1
1 TABLET, FILM COATED ORAL
Refills: 0 | DISCHARGE

## 2024-04-01 RX ORDER — EZETIMIBE AND SIMVASTATIN 10; 80 MG/1; MG/1
1 TABLET, FILM COATED ORAL
Qty: 0 | Refills: 0 | DISCHARGE

## 2024-04-01 RX ORDER — METOPROLOL TARTRATE 50 MG
1 TABLET ORAL
Qty: 0 | Refills: 0 | DISCHARGE

## 2024-04-01 RX ORDER — SODIUM CHLORIDE 9 MG/ML
1000 INJECTION, SOLUTION INTRAVENOUS
Refills: 0 | Status: DISCONTINUED | OUTPATIENT
Start: 2024-04-01 | End: 2024-04-01

## 2024-04-01 RX ORDER — AMLODIPINE BESYLATE 2.5 MG/1
0 TABLET ORAL
Qty: 0 | Refills: 0 | DISCHARGE

## 2024-04-01 RX ORDER — LOSARTAN POTASSIUM 100 MG/1
1 TABLET, FILM COATED ORAL
Qty: 0 | Refills: 0 | DISCHARGE

## 2024-04-01 NOTE — ASU DISCHARGE PLAN (ADULT/PEDIATRIC) - CARE PROVIDER_API CALL
Tanmay Marks  Urology  [address] [place] [address], [address] [address]  Phone: [phone]  Fax: [phone]  Follow Up Time: 2 weeks

## 2024-04-01 NOTE — ASU PREOP CHECKLIST - ISOLATION PRECAUTIONS
We are committed to providing you with the best care possible. In order to help us achieve these goals please remember to bring all medications, herbal products, and over the counter supplements with you to each visit. If your provider has ordered testing for you, please be sure to follow up with our office if you have not received results within 7 days after testing took place. *If you receive a survey after visiting one of our offices, please take the time to share your experience concerning your physician office visit. These surveys are confidential and no health information about you is shared. We are eager to improve for you and we are counting on your feedback to help make that happen.
none

## 2024-04-01 NOTE — ASU PATIENT PROFILE, ADULT - FALL HARM RISK - UNIVERSAL INTERVENTIONS
Bed in lowest position, wheels locked, appropriate side rails in place/Call bell, personal items and telephone in reach/Instruct patient to call for assistance before getting out of bed or chair/Non-slip footwear when patient is out of bed/[place] to call system/Physically safe environment - no spills, clutter or unnecessary equipment/Purposeful Proactive Rounding/Room/bathroom lighting operational, light cord in reach

## 2024-04-01 NOTE — CHART NOTE - NSCHARTNOTEFT_GEN_A_CORE
PACU ANESTHESIA ADMISSION NOTE      Procedure: Needle biopsy, prostate, transperineal, w stereotactic template-guided saturatn smplng, w image guid  mr/us fusion      Post op diagnosis:  Elevated PSA        ____  Intubated  TV:______       Rate: ______      FiO2: ______    _x___  Patent Airway    _x___  Full return of protective reflexes    ____  Full recovery from anesthesia / back to baseline status    Vitals: P 57 R 14 T 97.8 /60 SpO2 100%  T(C): 35.7 (04-01-24 @ 10:42), Max: 35.7 (04-01-24 @ 08:11)  HR: 58 (04-01-24 @ 10:42) (58 - 58)  BP: 150/71 (04-01-24 @ 10:42) (150/71 - 150/71)  RR: 19 (04-01-24 @ 10:42) (19 - 19)  SpO2: 99% (04-01-24 @ 10:42) (99% - 99%)    Mental Status:  __x__ Awake   _____ Alert   _____ Drowsy   _____ Sedated    Nausea/Vomiting:  _x___  NO       ______Yes,   See Post - Op Orders         Pain Scale (0-10):  __0___    Treatment: _x___ None    ____ See Post - Op/PCA Orders    Post - Operative Fluids:   ____ Oral   _x___ See Post - Op Orders  -------------as per surgeon    Plan: Discharge:   __x__Home       _____Floor     _____Critical Care    _____  Other:_________________    Comments:  No anesthesia issues or complications noted.  Discharge when criteria met.

## 2024-04-01 NOTE — BRIEF OPERATIVE NOTE - OPERATION/FINDINGS
lesions right anterior midgland and right posterior midgland, rectum to posterior prostate 5 mm, rectum to anterior margin of anterior lesion 2.7 cm.  central calcification in path  of ultrasound waves to anterior lesion.

## 2024-04-01 NOTE — ASU PATIENT PROFILE, ADULT - NSICDXPASTSURGICALHX_GEN_ALL_CORE_FT
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

## 2024-04-01 NOTE — BRIEF OPERATIVE NOTE - NSICDXBRIEFPROCEDURE_GEN_ALL_CORE_FT
PROCEDURES:  Needle biopsy, prostate, transperineal, w stereotactic template-guided minerva douglas image guid 01-Apr-2024 11:45:04 mr/us fusion Glenn Lees

## 2024-04-01 NOTE — ASU PREOP CHECKLIST - MEDICAL/PEDIATRIC CLEARANCE ON MEDICAL RECORD
cleared by Cardiology Elidel Counseling: Patient may experience a mild burning sensation during topical application. Elidel is not approved in children less than 2 years of age. There have been case reports of hematologic and skin malignancies in patients using topical calcineurin inhibitors although causality is questionable.

## 2024-04-01 NOTE — ASU DISCHARGE PLAN (ADULT/PEDIATRIC) - NS MD DC FALL RISK RISK
For information on Fall & Injury Prevention, visit: [email or web address] OR  [email or web address] OR  https://www.cdc.gov/steadi/patient.html

## 2024-04-07 LAB — SURGICAL PATHOLOGY STUDY: SIGNIFICANT CHANGE UP

## 2024-04-09 PROBLEM — I25.10 ATHEROSCLEROTIC HEART DISEASE OF NATIVE CORONARY ARTERY WITHOUT ANGINA PECTORIS: Chronic | Status: ACTIVE | Noted: 2024-03-18

## 2024-04-09 PROBLEM — E78.5 HYPERLIPIDEMIA, UNSPECIFIED: Chronic | Status: ACTIVE | Noted: 2024-03-18

## 2024-04-11 ENCOUNTER — APPOINTMENT (OUTPATIENT)
Dept: UROLOGY | Facility: CLINIC | Age: 79
End: 2024-04-11
Payer: MEDICARE

## 2024-04-11 VITALS
HEART RATE: 60 BPM | WEIGHT: 175 LBS | TEMPERATURE: 98.3 F | RESPIRATION RATE: 16 BRPM | BODY MASS INDEX: 27.47 KG/M2 | HEIGHT: 67 IN | DIASTOLIC BLOOD PRESSURE: 74 MMHG | SYSTOLIC BLOOD PRESSURE: 156 MMHG | OXYGEN SATURATION: 98 %

## 2024-04-11 PROBLEM — I25.10 ATHEROSCLEROTIC HEART DISEASE OF NATIVE CORONARY ARTERY WITHOUT ANGINA PECTORIS: Chronic | Status: ACTIVE | Noted: 2024-03-18

## 2024-04-11 PROBLEM — R97.20 ELEVATED PROSTATE SPECIFIC ANTIGEN [PSA]: Chronic | Status: ACTIVE | Noted: 2024-03-18

## 2024-04-11 PROCEDURE — G2211 COMPLEX E/M VISIT ADD ON: CPT

## 2024-04-11 PROCEDURE — 99214 OFFICE O/P EST MOD 30 MIN: CPT | Mod: 24

## 2024-04-25 ENCOUNTER — OUTPATIENT (OUTPATIENT)
Dept: OUTPATIENT SERVICES | Facility: HOSPITAL | Age: 79
LOS: 1 days | End: 2024-04-25
Payer: MEDICARE

## 2024-04-25 DIAGNOSIS — Z00.8 ENCOUNTER FOR OTHER GENERAL EXAMINATION: ICD-10-CM

## 2024-04-25 DIAGNOSIS — Z98.890 OTHER SPECIFIED POSTPROCEDURAL STATES: Chronic | ICD-10-CM

## 2024-04-25 DIAGNOSIS — C61 MALIGNANT NEOPLASM OF PROSTATE: ICD-10-CM

## 2024-04-25 DIAGNOSIS — I25.810 ATHEROSCLEROSIS OF CORONARY ARTERY BYPASS GRAFT(S) WITHOUT ANGINA PECTORIS: Chronic | ICD-10-CM

## 2024-04-25 DIAGNOSIS — I25.701 ATHEROSCLEROSIS OF CORONARY ARTERY BYPASS GRAFT(S), UNSPECIFIED, WITH ANGINA PECTORIS WITH DOCUMENTED SPASM: Chronic | ICD-10-CM

## 2024-04-25 LAB — GLUCOSE BLDC GLUCOMTR-MCNC: 95 MG/DL — SIGNIFICANT CHANGE UP (ref 70–99)

## 2024-04-25 PROCEDURE — 78816 PET IMAGE W/CT FULL BODY: CPT | Mod: 26,MH

## 2024-04-25 PROCEDURE — A9595: CPT

## 2024-04-25 PROCEDURE — 78816 PET IMAGE W/CT FULL BODY: CPT | Mod: PI

## 2024-04-25 PROCEDURE — 82962 GLUCOSE BLOOD TEST: CPT

## 2024-04-26 DIAGNOSIS — C61 MALIGNANT NEOPLASM OF PROSTATE: ICD-10-CM

## 2024-05-22 ENCOUNTER — RX RENEWAL (OUTPATIENT)
Age: 79
End: 2024-05-22

## 2024-05-22 RX ORDER — CLOPIDOGREL BISULFATE 75 MG/1
75 TABLET, FILM COATED ORAL DAILY
Qty: 90 | Refills: 3 | Status: ACTIVE | COMMUNITY
Start: 2023-05-25 | End: 1900-01-01

## 2024-05-29 ENCOUNTER — APPOINTMENT (OUTPATIENT)
Dept: UROLOGY | Facility: CLINIC | Age: 79
End: 2024-05-29
Payer: MEDICARE

## 2024-05-29 LAB
BILIRUB UR QL STRIP: NORMAL
COLLECTION METHOD: NORMAL
GLUCOSE UR-MCNC: NORMAL
HCG UR QL: 0.2 EU/DL
HGB UR QL STRIP.AUTO: NORMAL
KETONES UR-MCNC: NORMAL
LEUKOCYTE ESTERASE UR QL STRIP: NORMAL
NITRITE UR QL STRIP: NORMAL
PH UR STRIP: 6
PROT UR STRIP-MCNC: NORMAL
SP GR UR STRIP: 1.02

## 2024-05-29 PROCEDURE — 99214 OFFICE O/P EST MOD 30 MIN: CPT

## 2024-05-29 PROCEDURE — G2211 COMPLEX E/M VISIT ADD ON: CPT

## 2024-06-14 ENCOUNTER — APPOINTMENT (OUTPATIENT)
Dept: RADIATION ONCOLOGY | Facility: HOSPITAL | Age: 79
End: 2024-06-14
Payer: MEDICARE

## 2024-06-14 ENCOUNTER — OUTPATIENT (OUTPATIENT)
Dept: OUTPATIENT SERVICES | Facility: HOSPITAL | Age: 79
LOS: 1 days | End: 2024-06-14
Payer: MEDICARE

## 2024-06-14 VITALS
SYSTOLIC BLOOD PRESSURE: 143 MMHG | TEMPERATURE: 98.2 F | WEIGHT: 174.25 LBS | DIASTOLIC BLOOD PRESSURE: 65 MMHG | OXYGEN SATURATION: 97 % | RESPIRATION RATE: 16 BRPM | HEIGHT: 67 IN | HEART RATE: 64 BPM | BODY MASS INDEX: 27.35 KG/M2

## 2024-06-14 DIAGNOSIS — I25.701 ATHEROSCLEROSIS OF CORONARY ARTERY BYPASS GRAFT(S), UNSPECIFIED, WITH ANGINA PECTORIS WITH DOCUMENTED SPASM: Chronic | ICD-10-CM

## 2024-06-14 DIAGNOSIS — Z98.890 OTHER SPECIFIED POSTPROCEDURAL STATES: Chronic | ICD-10-CM

## 2024-06-14 DIAGNOSIS — I25.810 ATHEROSCLEROSIS OF CORONARY ARTERY BYPASS GRAFT(S) WITHOUT ANGINA PECTORIS: Chronic | ICD-10-CM

## 2024-06-14 DIAGNOSIS — C61 MALIGNANT NEOPLASM OF PROSTATE: ICD-10-CM

## 2024-06-14 PROCEDURE — 99205 OFFICE O/P NEW HI 60 MIN: CPT

## 2024-06-14 RX ORDER — GRAPE SEED EXTRACT 50 MG
CAPSULE ORAL
Refills: 0 | Status: ACTIVE | COMMUNITY

## 2024-06-14 RX ORDER — OMEGA-3/DHA/EPA/FISH OIL/KRILL 339-314 MG
CAPSULE ORAL
Refills: 0 | Status: ACTIVE | COMMUNITY

## 2024-06-14 RX ORDER — OMEGA-3/DHA/EPA/FISH OIL 1200 MG
1200 CAPSULE ORAL
Refills: 0 | Status: ACTIVE | COMMUNITY

## 2024-06-14 RX ORDER — MULTIVIT-MIN/FOLIC/VIT K/LYCOP 400-300MCG
1000 TABLET ORAL
Refills: 0 | Status: ACTIVE | COMMUNITY

## 2024-06-14 RX ORDER — BICALUTAMIDE 50 MG/1
50 TABLET ORAL DAILY
Qty: 30 | Refills: 0 | Status: ACTIVE | COMMUNITY
Start: 2024-06-14 | End: 1900-01-01

## 2024-06-14 RX ORDER — NITROGLYCERIN 0.4 MG/1
0.4 TABLET SUBLINGUAL
Refills: 0 | Status: DISCONTINUED | COMMUNITY
End: 2024-06-14

## 2024-06-14 NOTE — END OF VISIT
[FreeTextEntry3] : MD Note: I personally performed the evaluation and management services for this patient. This includes conducting the examination, assessing all conditions, and establishing the plan of care. Today, my ACP, Tonie Rivera, was here to observe my evaluation and management services for this patient. I agree with the documentation above, which I have reviewed and edited where appropriate. [Time Spent: ___ minutes] : I have spent [unfilled] minutes of time on the encounter.

## 2024-06-14 NOTE — REVIEW OF SYSTEMS
[Nocturia] : nocturia [Urinary Frequency] : urinary frequency [Negative] : Allergic/Immunologic [IPSS Score (0-40): ___] : IPSS score: [unfilled] [EPIC-CP Score (0-60): ___] : EPIC-CP score: [unfilled] [Urinary Ostomy] : no ~T urinary ostomy present [FreeTextEntry5] : H/o CABG and follows up with cardiology [FreeTextEntry8] : 2 times nocturia

## 2024-06-14 NOTE — DISEASE MANAGEMENT
[1] : T1 [c] : c [0] : M0 [0-10] : 0 -10 ng/mL [Biopsy with Fusion] : Patient had a biopsy with fusion on [8] : Fusion Biopsy Marley Score: 8 [] : Patient had a Prostate MRI [5] : 5 [IIC] : IIC [BiopsyDate] : 04/24 [MeasuredProstateVolume] : 25.1 [TotalCores] : 14 [TotalPositiveCores] : 6 [MaxCoreInvolvement] : 95 [FreeTextEntry7] :  4/25/24 PSMA negative for metastasis

## 2024-06-14 NOTE — HISTORY OF PRESENT ILLNESS
[FreeTextEntry1] : 78 yo man presenting with high risk prostate cancer. He had elevated PSA of 9.6ng/ml in January/2024. He went on to have an MRI prostate on 3/10/24 and it showed a 25.1 cc prostate with two PIRADS 4 lesions and one PIRADS 5 lesion. Prostate biopsy showed 4/14 cores with Talcott 8(4+4) Pca and 2/14 cores with Marley 7(3+4) Pca. PSMA scan showed "3+ intensity (more intense than salivary activity) focal abnormal PSMA uptake in the posterolateral right lobe and mid gland of the prostate gland consistent with documented biologic tumor activity.No other sites of abnormal PSMA uptake" He has discussed ADT with Dr Marks but has not yet started. Will get ADT w/ Dr. Marks's office. He has past medical h/o HTN, CAD, elevated lipids, CABG, TEO and mitral regurgitation. Patient is currently experiencing urinary frequency and nocturia, but no weak stream and no gross hematuria.   PSA 2021- 3.59ng/ml 1/31/24 9.6ng/ml  3/10/24 prostate MRI Volume: 25.1 cc. Central gland: Mild central gland hypertrophy. Peripheral zone: Lesions as described below.  MRI Targets: LESION: #1 Location: Right, posterior medial-lateral (PZpm-pl), [id number], peripheral zone. Extra-prostatic extension: Irregularity, tumor abuts capsule causing capsular irregularity. PI-RADS Assessment Category: 5, Very High  LESION: #2Location: Right, anterior (PZa), apex, peripheral and transition zone. Extra-prostatic extension: Abutment, tumor abuts but does not deform capsule. PI-RADS Assessment Category: 4, High  LESION: #3 Location: Left, posterior medial-lateral (PZpm-pl), midgland, peripheral zone. Extra-prostatic extension: Irregularity, tumor abuts capsule causing capsular irregularity. PI-RADS Assessment Category: 4, High  Neurovascular bundle: Mild irregularity of the rectal prostatic angle bilaterally, possibly reflecting impingement of the neurovascular bundle. Seminal vesicles: No seminal vesicle invasion. Lymph nodes: No enlarged pelvic lymph nodes. Bones: No suspicious lesions identified. Urinary bladder: Underdistended limiting evaluation.  04/2024 Oncotype GPS= 58  04/01/2024 pathology 4 cores of G8, 2 cores of 3+4. 6/14 cores 1. Prostate, right medial apex, needle biopsy- Adenocarcinoma of the prostate, Prognostic Grade Group 4 (Talcott score 4+4=8), involving 95% (9 mm in length) of 1 of 1 core - Lymphovascular invasion present 2. Prostate, right medial base, needle biopsy - Benign prostatic tissue 3. Prostate, right lateral apex, needle biopsy - Adenocarcinoma of the prostate, Prognostic Grade Group 4 (Marley score 4+4=8), involving 60% (4 mm in length) of 1 of 1 core  4. Prostate, right lateral base, needle biopsy- Benign prostatic tissue 5. Prostate, right lateral, needle biopsy - Benign prostatic tissue 6. Prostate, right anterior, needle biopsy - Adenocarcinoma of the prostate, Prognostic Grade Group 2 (Talcott score 3+4=7) involving 10% (1 mm in length) of 1 of 1 core - Talcott pattern 4 comprises 20% of tumor - Perineural invasion present 7. Prostate, left medial apex, needle biopsy - Adenocarcinoma of the prostate, Prognostic Grade Group 4 (Talcott score 4+4=8), involving 10% (1.5 mm in length) of 1 of 1 core - Focus suspicious for lymphovascular invasion 8. Prostate, left medial base, needle biopsy - Benign prostatic tissue 9. Prostate, left lateral apex, needle biopsy - Benign prostatic tissue 10. Prostate, left lateral base, needle biopsy - Benign prostatic tissue 11. Prostate, left lateral, needle biopsy - Benign prostatic tissue 12. Prostate, left anterior, needle biopsy - Benign prostatic tissue 13. Prostate, region of interest, right posterior mid gland, needle biopsy - Adenocarcinoma of the prostate, Prognostic Grade Group 4 (Marley score 4+4=8), involving 60%, 60%, 90% and 85% (6.5 mm, 5 mm, 11 mm and 10.5 mm in length) of 4 of 4 cores - Perineural invasion present - Suspicious for extraprostatic extension 14. Prostate, region of interest, right anterior mid gland, needle biopsy - Adenocarcinoma of the prostate, Prognostic Grade Group 2 (Marley score 3+4=7) involving 100%, 75% and <10% (2, mm, 8 mm and 0.5 mm in length) of 3 of 3 core fragments - Marley pattern 4 comprises 10% of tumor Note: Cribriform Talcott pattern 4 and intraductal carcinoma are present in this case.  4/25/24 PSMA  IMPRESSION: 3+ intensity (more intense than salivary activity) focal abnormal PSMA uptake in the posterolateral right lobe and mid gland of the prostate gland consistent with documented biologic tumor activity.

## 2024-06-17 ENCOUNTER — NON-APPOINTMENT (OUTPATIENT)
Age: 79
End: 2024-06-17

## 2024-06-20 DIAGNOSIS — C61 MALIGNANT NEOPLASM OF PROSTATE: ICD-10-CM

## 2024-06-27 ENCOUNTER — APPOINTMENT (OUTPATIENT)
Dept: UROLOGY | Facility: CLINIC | Age: 79
End: 2024-06-27
Payer: MEDICARE

## 2024-06-27 VITALS
OXYGEN SATURATION: 98 % | TEMPERATURE: 98 F | HEIGHT: 67 IN | HEART RATE: 56 BPM | BODY MASS INDEX: 27.31 KG/M2 | SYSTOLIC BLOOD PRESSURE: 159 MMHG | WEIGHT: 174 LBS | DIASTOLIC BLOOD PRESSURE: 75 MMHG | RESPIRATION RATE: 16 BRPM

## 2024-06-27 DIAGNOSIS — C61 MALIGNANT NEOPLASM OF PROSTATE: ICD-10-CM

## 2024-06-27 DIAGNOSIS — R39.9 UNSPECIFIED SYMPTOMS AND SIGNS INVOLVING THE GENITOURINARY SYSTEM: ICD-10-CM

## 2024-06-27 LAB
BILIRUB UR QL STRIP: NORMAL
COLLECTION METHOD: NORMAL
GLUCOSE UR-MCNC: NORMAL
HCG UR QL: 0.2 EU/DL
HGB UR QL STRIP.AUTO: NORMAL
KETONES UR-MCNC: NORMAL
LEUKOCYTE ESTERASE UR QL STRIP: NORMAL
NITRITE UR QL STRIP: NORMAL
PH UR STRIP: 7
PROT UR STRIP-MCNC: NORMAL
SP GR UR STRIP: 1.01

## 2024-06-27 PROCEDURE — 99214 OFFICE O/P EST MOD 30 MIN: CPT

## 2024-06-27 PROCEDURE — G2211 COMPLEX E/M VISIT ADD ON: CPT

## 2024-07-18 ENCOUNTER — OUTPATIENT (OUTPATIENT)
Dept: OUTPATIENT SERVICES | Facility: HOSPITAL | Age: 79
LOS: 1 days | Discharge: HOME | End: 2024-07-18
Payer: MEDICARE

## 2024-07-18 DIAGNOSIS — Z98.890 OTHER SPECIFIED POSTPROCEDURAL STATES: Chronic | ICD-10-CM

## 2024-07-18 DIAGNOSIS — I25.701 ATHEROSCLEROSIS OF CORONARY ARTERY BYPASS GRAFT(S), UNSPECIFIED, WITH ANGINA PECTORIS WITH DOCUMENTED SPASM: Chronic | ICD-10-CM

## 2024-07-18 DIAGNOSIS — I25.810 ATHEROSCLEROSIS OF CORONARY ARTERY BYPASS GRAFT(S) WITHOUT ANGINA PECTORIS: Chronic | ICD-10-CM

## 2024-07-18 PROCEDURE — 77333 RADIATION TREATMENT AID(S): CPT | Mod: 26

## 2024-07-18 PROCEDURE — 77263 THER RADIOLOGY TX PLNG CPLX: CPT

## 2024-07-18 PROCEDURE — 77333 RADIATION TREATMENT AID(S): CPT

## 2024-07-19 DIAGNOSIS — C61 MALIGNANT NEOPLASM OF PROSTATE: ICD-10-CM

## 2024-07-26 ENCOUNTER — APPOINTMENT (OUTPATIENT)
Dept: CARDIOLOGY | Facility: CLINIC | Age: 79
End: 2024-07-26

## 2024-07-26 ENCOUNTER — OUTPATIENT (OUTPATIENT)
Dept: OUTPATIENT SERVICES | Facility: HOSPITAL | Age: 79
LOS: 1 days | Discharge: HOME | End: 2024-07-26
Payer: MEDICARE

## 2024-07-26 DIAGNOSIS — Z98.890 OTHER SPECIFIED POSTPROCEDURAL STATES: Chronic | ICD-10-CM

## 2024-07-26 PROCEDURE — 77300 RADIATION THERAPY DOSE PLAN: CPT | Mod: 26

## 2024-07-26 PROCEDURE — 77338 DESIGN MLC DEVICE FOR IMRT: CPT | Mod: 26

## 2024-07-26 PROCEDURE — 77301 RADIOTHERAPY DOSE PLAN IMRT: CPT | Mod: 26

## 2024-07-31 DIAGNOSIS — C61 MALIGNANT NEOPLASM OF PROSTATE: ICD-10-CM

## 2024-08-09 ENCOUNTER — TRANSCRIPTION ENCOUNTER (OUTPATIENT)
Age: 79
End: 2024-08-09

## 2024-08-09 RX ORDER — ENOXAPARIN SODIUM 100 MG/ML
30 INJECTION SUBCUTANEOUS EVERY 12 HOURS
Refills: 0 | Status: DISCONTINUED | OUTPATIENT
Start: 2024-08-11 | End: 2024-08-20

## 2024-08-09 RX ORDER — LOSARTAN POTASSIUM 50 MG/1
25 TABLET ORAL DAILY
Refills: 0 | Status: DISCONTINUED | OUTPATIENT
Start: 2024-08-11 | End: 2024-08-20

## 2024-08-09 RX ORDER — AMPICILLIN SODIUM AND SULBACTAM SODIUM 1; .5 G/1; G/1
3 INJECTION, POWDER, FOR SOLUTION INTRAMUSCULAR; INTRAVENOUS EVERY 6 HOURS
Refills: 0 | Status: DISCONTINUED | OUTPATIENT
Start: 2024-08-11 | End: 2024-08-12

## 2024-08-09 RX ORDER — CHLORHEXIDINE GLUCONATE 40 MG/ML
1 SOLUTION TOPICAL
Refills: 0 | Status: DISCONTINUED | OUTPATIENT
Start: 2024-08-11 | End: 2024-08-20

## 2024-08-09 RX ORDER — MAGNESIUM, ALUMINUM HYDROXIDE 200-225/5
30 SUSPENSION, ORAL (FINAL DOSE FORM) ORAL EVERY 6 HOURS
Refills: 0 | Status: DISCONTINUED | OUTPATIENT
Start: 2024-08-11 | End: 2024-08-19

## 2024-08-09 RX ORDER — AMLODIPINE BESYLATE 10 MG/1
5 TABLET ORAL DAILY
Refills: 0 | Status: DISCONTINUED | OUTPATIENT
Start: 2024-08-11 | End: 2024-08-20

## 2024-08-09 RX ORDER — BACITRACIN 500 UNIT/G
1 OINTMENT (GRAM) TOPICAL EVERY 24 HOURS
Refills: 0 | Status: DISCONTINUED | OUTPATIENT
Start: 2024-08-11 | End: 2024-08-19

## 2024-08-09 RX ORDER — METOPROLOL TARTRATE 100 MG/1
12.5 TABLET ORAL EVERY 12 HOURS
Refills: 0 | Status: DISCONTINUED | OUTPATIENT
Start: 2024-08-11 | End: 2024-08-20

## 2024-08-09 RX ORDER — CHLORHEXIDINE GLUCONATE 40 MG/ML
15 SOLUTION TOPICAL
Refills: 0 | Status: DISCONTINUED | OUTPATIENT
Start: 2024-08-11 | End: 2024-08-19

## 2024-08-09 RX ORDER — SENNA 187 MG
2 TABLET ORAL AT BEDTIME
Refills: 0 | Status: DISCONTINUED | OUTPATIENT
Start: 2024-08-11 | End: 2024-08-20

## 2024-08-09 RX ORDER — FOLIC ACID 1 MG
1 TABLET ORAL DAILY
Refills: 0 | Status: DISCONTINUED | OUTPATIENT
Start: 2024-08-11 | End: 2024-08-20

## 2024-08-09 RX ORDER — TRAZODONE HCL 50 MG
50 TABLET ORAL EVERY 24 HOURS
Refills: 0 | Status: DISCONTINUED | OUTPATIENT
Start: 2024-08-11 | End: 2024-08-20

## 2024-08-09 RX ORDER — POLYETHYLENE GLYCOL 3350 17 G/17G
17 POWDER, FOR SOLUTION ORAL DAILY
Refills: 0 | Status: DISCONTINUED | OUTPATIENT
Start: 2024-08-11 | End: 2024-08-20

## 2024-08-09 RX ORDER — TAMSULOSIN HYDROCHLORIDE 0.4 MG/1
0.4 CAPSULE ORAL AT BEDTIME
Refills: 0 | Status: DISCONTINUED | OUTPATIENT
Start: 2024-08-11 | End: 2024-08-20

## 2024-08-09 RX ORDER — CHLORHEXIDINE GLUCONATE 40 MG/ML
1 SOLUTION TOPICAL
Refills: 0 | Status: DISCONTINUED | OUTPATIENT
Start: 2024-08-11 | End: 2024-08-19

## 2024-08-09 RX ORDER — ASPIRIN 81 MG
81 TABLET, DELAYED RELEASE (ENTERIC COATED) ORAL DAILY
Refills: 0 | Status: DISCONTINUED | OUTPATIENT
Start: 2024-08-11 | End: 2024-08-20

## 2024-08-09 RX ORDER — OXYCODONE HYDROCHLORIDE 5 MG/1
5 TABLET ORAL EVERY 4 HOURS
Refills: 0 | Status: DISCONTINUED | OUTPATIENT
Start: 2024-08-11 | End: 2024-08-11

## 2024-08-09 RX ORDER — THIAMINE HCL 250 MG
100 TABLET ORAL DAILY
Refills: 0 | Status: DISCONTINUED | OUTPATIENT
Start: 2024-08-11 | End: 2024-08-20

## 2024-08-09 RX ORDER — SIMVASTATIN 10 MG
10 TABLET ORAL AT BEDTIME
Refills: 0 | Status: DISCONTINUED | OUTPATIENT
Start: 2024-08-11 | End: 2024-08-20

## 2024-08-09 NOTE — H&P ADULT - NSHPPHYSICALEXAM_GEN_ALL_CORE
PHYSICAL EXAMINATION   VItals: T(C): 37.2 (08-09-24 @ 08:29), Max: 37.4 (08-08-24 @ 16:27)  HR: 79 (08-09-24 @ 08:29) (79 - 91)  BP: 119/75 (08-09-24 @ 08:29) (100/60 - 146/65)  RR: 18 (08-09-24 @ 08:29) (18 - 18)  SpO2: 95% (08-09-24 @ 08:29) (95% - 100%)    General:[ x  ] NAD, Resting Comfortable,   [   ] other:                                HEENT: [  x ] NC/AT, EOMI, PERRL , [] Normal Conjunctivae,   [   ] other: decreased L with L subconjuctival hemorrhage   Cardio: [  x ] RRR, [x] MVR murmer,   [   ] other:                           Pulm: [ x  ] No Respiratory Distress,  Lungs CTAB,   [   ] other:                       Abdomen: [ x  ]ND/NT, Soft,   [   ] other:    : [ x  ] NO SOLORZANO CATHETER, [   ] SOLORZANO CATHETER- no meatal tear, no discharge, [   ] other:                          MSK: [ x  ] No joint swelling, Full ROM, appropriate passive and active ROM,   [   ] other:  diffuse resting tremors  Ext: [  x ]No C/C/E, No calf tenderness,   [   ]other:    Skin: [   ]intact,   [  x ] other:  fascial bruising, b/l orbital ecchymosis, L knee contusion                                                                Neurological Examination:  Cognitive: [    ] AAO x 2 not orientated to time,   [    ]  other:                                                                      Attention:  [ x   ] intact,   [    ]  other:  able to do serial 7s                          Memory: [  x  ] intact,    [    ]  other:     Mood/Affect: [ x   ] wnl,    [    ]  other:                                                                             Communication: [  x  ]Fluent, no dysarthria, following commands:  [    ] other:   CN II - XII:  [ x   ] intact except for decreased L vision,  [    ] other:                                                                                        Motor:   RIGHT UE: [   ] WNL,  [   ] other: Shoulder abduction 5/5, Elbow flexion 5/5, Elbow extension 5/5, Wrist Extension 5/5, Finger abduction 5/5,  4+/5   LEFT    UE: [   ] WNL,  [   ] other: Shoulder abduction 5/5, Elbow flexion 5/5, Elbow extension 5/5, Wrist Extension 5/5, Finger abduction 5/5,  4+/5     RIGHT LE: [   ] WNL,  [   ] other: Hip flexion 4-/5, knee extension 4-/5, plantarflexion 5/5, dorsiflexion 5/5  LEFT    LE: [   ] WNL,  [   ] other: Hip flexion 5/5, 5/5  knee extension/plantarflexion/dorsiflexion     Tone: [   x ] wnl,   [    ]  other:  DTRs: [x   ]symmetric, [   ] other:  Coordination:   [  x  ] intact,   [    ] other:                                                                           Sensory: [  x  ] Intact to light touch,   [    ] other: PHYSICAL EXAMINATION   T(C): 36.3 (08-11-24 @ 10:44), Max: 36.8 (08-11-24 @ 04:03)  T(F): 97.3 (08-11-24 @ 10:44), Max: 98.2 (08-11-24 @ 04:03)  HR: 77 (08-11-24 @ 10:44) (77 - 88)  BP: 126/73 (08-11-24 @ 10:44) (126/73 - 159/80)  ABP: --  ABP(mean): --  RR: 18 (08-11-24 @ 10:44) (18 - 18)  SpO2: 95% (08-11-24 @ 10:44) (95% - 98%)    General:[ x  ] NAD, Resting Comfortable,   [   ] other:                                HEENT: [  x ] NC/AT, EOMI, PERRL , [] Normal Conjunctivae,   [   ] other: decreased L with L subconjuctival hemorrhage   Cardio: [  x ] RRR, [x] MVR murmer,   [   ] other:                           Pulm: [ x  ] No Respiratory Distress,  Lungs CTAB,   [   ] other:                       Abdomen: [ x  ]ND/NT, Soft,   [   ] other:    : [ x  ] NO SOLORZANO CATHETER, [   ] SOLORZANO CATHETER- no meatal tear, no discharge, [   ] other:                          MSK: [ x  ] No joint swelling, Full ROM, appropriate passive and active ROM,   [   ] other:  diffuse resting tremors  Ext: [  x ]No C/C/E, No calf tenderness,   [   ]other:    Skin: [   ]intact,   [  x ] other:  fascial bruising, b/l orbital ecchymosis, L knee contusion                                                                Neurological Examination:  Cognitive: [    ] AAO x 2 not orientated to time,   [    ]  other:                                                                      Attention:  [ x   ] intact,   [    ]  other:  able to do serial 7s                          Memory: [  x  ] intact,    [    ]  other:     Mood/Affect: [ x   ] wnl,    [    ]  other:                                                                             Communication: [  x  ]Fluent, no dysarthria, following commands:  [    ] other:   CN II - XII:  [ x   ] intact except for decreased L vision,  [    ] other:                                                                                        Motor:   RIGHT UE: [   ] WNL,  [   ] other: Shoulder abduction 5/5, Elbow flexion 5/5, Elbow extension 5/5, Wrist Extension 5/5, Finger abduction 5/5,  4+/5   LEFT    UE: [   ] WNL,  [   ] other: Shoulder abduction 5/5, Elbow flexion 5/5, Elbow extension 5/5, Wrist Extension 5/5, Finger abduction 5/5,  4+/5     RIGHT LE: [   ] WNL,  [   ] other: Hip flexion 4-/5, knee extension 4-/5, plantarflexion 5/5, dorsiflexion 5/5  LEFT    LE: [   ] WNL,  [   ] other: Hip flexion 5/5, 5/5  knee extension/plantarflexion/dorsiflexion     Tone: [   x ] wnl,   [    ]  other:  DTRs: [x   ]symmetric, [   ] other:  Coordination:   [  x  ] intact,   [    ] other:                                                                           Sensory: [  x  ] Intact to light touch,   [    ] other: PHYSICAL EXAMINATION   T(C): 36.3 (08-11-24 @ 10:44), Max: 36.8 (08-11-24 @ 04:03)  T(F): 97.3 (08-11-24 @ 10:44), Max: 98.2 (08-11-24 @ 04:03)  HR: 77 (08-11-24 @ 10:44) (77 - 88)  BP: 126/73 (08-11-24 @ 10:44) (126/73 - 159/80)  ABP: --  ABP(mean): --  RR: 18 (08-11-24 @ 10:44) (18 - 18)  SpO2: 95% (08-11-24 @ 10:44) (95% - 98%)    General:[ x  ] NAD, Resting Comfortable,   [   ] other:                                HEENT: [  x ] NC/AT, EOMI, PERRL , [] Normal Conjunctivae,   [   ] other: decreased L with L subconjuctival hemorrhage   Cardio: [  x ] RRR, [x] grade II/VI systolic ejection murmur,   [   ] other:                           Pulm: [ x  ] No Respiratory Distress,  Lungs CTAB,   [   ] other:                       Abdomen: [ x  ]ND/NT, Soft,   [   ] other:    : [ x  ] NO SOLORZANO CATHETER, [   ] SOLORZANO CATHETER- no meatal tear, no discharge, [   ] other:                          MSK: [ x  ] No joint swelling, Full ROM, appropriate passive and active ROM,   [   ] other:  diffuse resting tremors  Ext: [  x ]No C/C/E, No calf tenderness,   [   ]other:    Skin: [   ]intact,   [  x ] other:  fascial bruising, b/l orbital ecchymosis, L knee contusion                                                                Neurological Examination:  Cognitive: [    ] AAO x 2 not orientated to time,   [    ]  other:                                                                      Attention:  [ x   ] intact,   [    ]  other:  able to do serial 7s                          Memory: [  x  ] intact,    [    ]  other:     Mood/Affect: [ x   ] wnl,    [    ]  other:                                                                             Communication: [  x  ]Fluent, no dysarthria, following commands:  [    ] other:   CN II - XII:  [ x   ] intact except for decreased L vision,  [    ] other:                                                                                        Motor:   RIGHT UE: [   ] WNL,  [   ] other: Shoulder abduction 5/5, Elbow flexion 5/5, Elbow extension 5/5, Wrist Extension 5/5, Finger abduction 5/5,  4+/5   LEFT    UE: [   ] WNL,  [   ] other: Shoulder abduction 5/5, Elbow flexion 5/5, Elbow extension 5/5, Wrist Extension 5/5, Finger abduction 5/5,  4+/5     RIGHT LE: [   ] WNL,  [   ] other: Hip flexion 4-/5, knee extension 4-/5, plantarflexion 5/5, dorsiflexion 5/5  LEFT    LE: [   ] WNL,  [   ] other: Hip flexion 5/5, 5/5  knee extension/plantarflexion/dorsiflexion     Tone: [   x ] wnl,   [    ]  other:  DTRs: [x   ]symmetric, [   ] other:  Coordination:   [  x  ] intact,   [    ] other:                                                                           Sensory: [  x  ] Intact to light touch,   [    ] other:

## 2024-08-09 NOTE — H&P ADULT - ATTENDING COMMENTS
Patient seen and examined with the rehab resident. We discussed the case. I read, edited, and agree with the note. The patient requires admission to the acute rehab program with 3 hours of therapy daily at least 5 days per week and regular physiatry F/U to address his complex medical issues.    ASSESSMENT/PLAN  79 y.o. M with PMH of CAD w/ prior MI s/p CABG (2000), HTN, HLD, MVR, TEO noncompliant with CPAP, recent diagnosis of prostate CA presented on 7/26/2024 to SICU after sustaining injuries from a mechanical fall (+HT, -LOC, -AC).  CT panscan revealed extensive b/l facial Type III [place] fractures (bilateral facial fx traversing bilateral orbits, nasal bones, nasal septum, maxillary sinus, pterygoid plates and left zygomatic arch) and soft tissue swelling. OMFS consulted. Patient underwent (8/1) ORIF of multiple facial fractures, bilateral internal maxillary arteries embolization by neuroendovascular.    Rehab for traumatic head trauma sustaining multiple Facial Bone Fractures, [place] Type III, epistaxis S/P Bilateral Internal Maxillary Arteries embolization and ORIF of Facial Bone Fracture, [place] Type III 2/2 mechanical fall      #Traumatic mechanical fall   #Head Trauma   #Multiple Facial Bone Fracture, [place] Type III.   #Epistaxis (resolved)   #S/P Bilateral Internal Maxillary Arteries embolization    #S/P ORIF of Facial Bone Fracture, [place] Type III   #S/P tracheostomy   CT panscan revealed extensive b/l fascial Type III [place] fractures (bilateral facial fx traversing bilateral orbits, nasal bones, nasal septum, maxillary sinus, pterygoid plates and left zygomatic arch) and soft tissue swelling.    OMFS consulted. Patient underwent (8/1) ORIF of multiple fascial fractures.    ENT consulted for ongoing nasal bleeding s/p rhino rocket. Continues to bleed. Underwent bilateral internal maxillary arteries embolization by neuroendovascular. f/u Dr. Marcum outpatient    Ophthalmology consulted for  b/l orbital fractures, no intervention Tracheostomy for airway protection, decannulation 8/8/2024  - c/w Unasyn 3g q6h   - Pain control: Tylenol, oxycodone 5 mg q4hr PRN (avoid NSAID)    - Precaution: fall, sinus precautions (open mouth sneezing, no nose blowing), Head of bed elevated @ 30 degrees   - encourage incentive spirometry   - Monitor for acute bleeding      #L optic neuropathy    - Ophthalmology consulted, believed to be chronic, no acute intervention. Recommend outpatient f/u for MRI brain/orbits, heavy metals, B12, folate, syphilis, SARAI, ACE, antiphospholipid antibx     #leukocytosis (resolved)  - WBC downtrending, low grade temp on 8/9might 2/2 recent steroid use and Percedex  - WBC wnl, afebrile for 48 hours  - on Unasyn  - monitor vitals     #L ICA occlusion, R ICA stenosis    - 7/26 CTA neck showed Occlusion of L ICA at the L carotid bulb; Occlusion of the distal cervical portion of the left ICA; Severe stenosis at the right carotid bulb.  No evidence of vascular injury, dissection, or extravasation.   - Echo (10/2023): EF 63%, mildly-enlarged LA, no stress-induced wall motion abnormalities, moderate MR, TR, and AR, sclerotic aortic valve, mild NE, borderline pulmonary HTN, mild aortic root calcification   - Vascular consulted. Recommend outpatient F/U w/ Dr. Quigley for surgical planning for possible Right carotid endarterectomy     #CAD w/ prior MI s/p CABG (2000)   #MVR  #HTN   #HLD   - c/w ASA + Plavix    - Home med: Metoprolol 50 mg qd, losartan 100 mg QD, amlodipine 5 mg QD, HCTZ 25 mg QD, Ezetimibe-Simvastatin 10-10   - While inpt: continue metoprolol 12.5 mg bid, losartan 25 mg daily, amlodipine 5 mg QD, HCTZ 25 mg QD   - Continue simvastatin 10 mg   - Monitor and adjust as needed      #acute blood loss anemia    - Hb (8/8/2024) 8.4   - received 1u PRBC  - transfuse if Hb < 7  - type and screen      #urinary retention    - c/w Flomax 0.4 mg qhs  - monitor       #TOE    - Noncompliant with home CPAP   - Hold CPAP while facial injuries heal    -Pain control: Tylenol 650 mg q6h, Oxycodone 5 mg q4hr PRN     -GI/Bowel Mgmt: Miralax/senna    - Diet: Pureed diet      Precautions / PROPHYLAXIS:  Falls, sinus precautions (open mouth sneezing, no nose blowing), Head of bed elevated @ 30 degrees       - DVT prophylaxis: Lovenox 30 mg q12h

## 2024-08-09 NOTE — H&P ADULT - NSHPADDITIONALINFOADULT_GEN_ALL_CORE
This H&P was commenced on 8/9/2024 but the admission to the rehab service was postponed because of fever, T 101.8 F.  He was reevaluated and approved for the admission to the rehab service on 8/11/2024 after being afebrile for 48 hours. This note represents his assessment and medical status as of 8/11/2024.

## 2024-08-09 NOTE — H&P ADULT - ASSESSMENT
ASSESSMENT/PLAN  79M with PMH of CAD w/ prior MI s/p CABG (2000), HTN, HLD, MVR, TEO non compliant with CPAP, recent diagnosis of prostate CA presented on 7/26/2024 to SICU after sustaining injuries from a mechanical fall (+HT, -LOC, -AC).  CT panscan revealed extensive b/l fascial Type III [place] fractures (bilateral facial fx traversing bilateral orbits, nasal bones, nasal septum, maxillary sinus, pterygoid plates and left zygomatic arch) and soft tissue swelling. OMFS consulted. Patient underwent (8/1) ORIF of multiple fascial fractures, bilateral internal maxillary arteries embolization by neuroendovascular.    For rehab of traumatic head trauma sustaining multiple Facial Bone Fracture, [place] Type III, epistaxis S/P Bilateral Internal Maxillary Arteries embolization and ORIF of Facial Bone Fracture, [place] Type III 2/2 mechanical fall      #Traumatic mechanical fall   #Head Trauma   #Multiple Facial Bone Fracture, [place] Type III.   #Epistaxis (resolved)   #S/P Bilateral Internal Maxillary Arteries embolization    #S/P ORIF of Facial Bone Fracture, [place] Type III   #S/P tracheostomy   CT panscan revealed extensive b/l fascial Type III [place] fractures (bilateral facial fx traversing bilateral orbits, nasal bones, nasal septum, maxillary sinus, pterygoid plates and left zygomatic arch) and soft tissue swelling.    OMFS consulted. Patient underwent (8/1) ORIF of multiple fascial fractures.    ENT consulted for ongoing nasal bleeding s/p rhino rocket. Continues to bleed. Underwent bilateral internal maxillary arteries embolization by neuroendovascular. f/u Dr. Marcum outpatient    Ophthalmology consulted for  b/l orbital fractures, no intervention Ttracheostomy for airway protection, decannulation 8/8   - c/w Unasyn 3g q6h   - Pain control: Tylenol, Oxycodone 5mg q4hr PRN (avoid NSAID)    - Precaution: fall, sinus precautions (open mouth sneezing, no nose blowing), Head of bead elevated @ 30 degrees   - encourage incentive spirometry   - Monitor for acute bleeding      #L optic neuropathy    - Ophthalmology consulted, believed to be chronic, no acute intervention. Recommend outpatient f/u for MRI brain/orbits, heavy metals, B12, folate, syphilis, SARAI, ACE, antiphospholipid antibx     #leukocytosis  - WBC downtrending, low grade temp might 2/2 recent steroid use and percedex  - on Unasyn  - monitor      #L ICA occlusion, R ICA stenosis    - 7/26 CTA neck showed Occlusion of L ICA at the L carotid bulb; Occlusion of the distal cervical portion of the left ICA; Severe stenosis at the right carotid bulb.  No evidence of vascular injury, dissection, or extravasation.   - Echo (10/2023): EF 63%, mildly-enlarged LA, no stress-induced wall motion abnormalities, moderate MR, TR, and AR, sclerotic aortic valve, mild NC, borderline pulmonary HTN, mild aortic root calcification   - Vascular consulted. Recommend outpatient w/ Dr. Quigley for surgical planning for possible Right carotid endarterectomy     #CAD w/ prior MI s/p CABG (2000)   #MVR  #HTN   #HLD   - c/w ASA + plavix    - Home med: Metoprolol 50mg qd, losartan 100mg QD, amlodipine 5mg QD, HCTZ 25mg QD, Ezetimibe-Simvastatin 10-10   - While inpt: continue metoprolol 12.5mg bid, losartan 25mg daily, amlodipine 5mg QD, HCTZ 25mg QD   - Continue simvastatin 10mg   - Monitor and adjust as needed      #acute blood loss anemia    - Hgb (8/8/) 8.4   - received 1u PRBC  - transfuse if Hgb < 7  - type and screen      #urinary retention    - c/w flomax 0.4mg qhs  - monitor       #TEO    - Non-compliant with home CPAP   - Hold CPAP while facial injuries heal    -Pain control: Tylenol 650mg q6h, Oxycodone 5mg q4hr PRN     -GI/Bowel Mgmt: Miralax/senna    -Bladder management: voiding spontaneously      -Skin: fascial bruising, ecchymosis b/l orbit, L knee contusion    - Diet: Pureed diet      Precautions / PROPHYLAXIS:  Falls, sinus precautions (open mouth sneezing, no nose blowing), Head of bead elevated @ 30 degrees     - Ortho: Weight bearing status: no weight bearing restrictions       - DVT prophylaxis: Lovenox 30mg q12h      MEDICAL PROGNOSIS: GOOD            REHAB POTENTIAL: GOOD             ESTIMATED DISPOSITION: HOME WITH HOME CARE       [ x ]  The goals of the IRF admission were discussed with the patient and or family member, who agreed             ELOS:  [ x ] 7-14    [    ]  14-21    [    ]  Other    THERAPY ORDERS and INITIAL INDIVIDUALIZED PLAN OF CARE:  This initial individualized interdisciplinary plan of care, which was established by me (the attending physiatrist), is based on elements from the post admission evaluation. The interdisciplinary therapy program is to be at least 3 hrs a day, at least 5 days per week from from physical, occupational and/ or speech therapies as ordered by me below.      [ x  ] P.T. 90 mins. /day at least 5 out of 7 days:  [  x ] superficial  modalities prn, [ x  ] A/AAROM, [ x  ] PREs, [ x  ] transfer training,            [ x  ] progressive ambulation, [x   ] stairs                                               [ x  ] O.T. 90 mins. /day at least 5 out of 7 days::  [ x  ] modalities prn,  [ x  ]A/AAROM, [ x  ] PREs, [  x ] functional transfer training, [ x  ] ADL training           [   ] cognitive/ perceptual eval and training, [   ] splint eval                                                  [   ] S.L.P:  [   ] speech eval, [   ] swallow eval     [   ] Neuropsychology eval     [ x  ] Individualized rec. therapy      RATIONALE FOR INPATIENT ADMISSION - Patient demonstrates the following: (check all that apply)  [X] Medically appropriate for acute rehabilitation admission. Requires interdisiplinary therapy consisting of at least PT and OT, at least 3 hrs. a day at least 5 days a week  [X] Has attainable rehab goals with an appropriate initial discharge plan  [X] Has rehabilitation potential (expected to make a significant improvement within a reasonable period of time)  [X] Requires close medical management by a rehab physician, rehab nursing care,  and comprehensive interdisciplinary team (including PT, OT)    [X] Requires evaluation by a physiatrist at least 3 days a week to evaluate and manage and coordinate rehab and medical problems   ASSESSMENT/PLAN  79M with PMH of CAD w/ prior MI s/p CABG (2000), HTN, HLD, MVR, TEO non compliant with CPAP, recent diagnosis of prostate CA presented on 7/26/2024 to SICU after sustaining injuries from a mechanical fall (+HT, -LOC, -AC).  CT panscan revealed extensive b/l fascial Type III [place] fractures (bilateral facial fx traversing bilateral orbits, nasal bones, nasal septum, maxillary sinus, pterygoid plates and left zygomatic arch) and soft tissue swelling. OMFS consulted. Patient underwent (8/1) ORIF of multiple fascial fractures, bilateral internal maxillary arteries embolization by neuroendovascular.    For rehab of traumatic head trauma sustaining multiple Facial Bone Fracture, [place] Type III, epistaxis S/P Bilateral Internal Maxillary Arteries embolization and ORIF of Facial Bone Fracture, [place] Type III 2/2 mechanical fall      #Traumatic mechanical fall   #Head Trauma   #Multiple Facial Bone Fracture, [place] Type III.   #Epistaxis (resolved)   #S/P Bilateral Internal Maxillary Arteries embolization    #S/P ORIF of Facial Bone Fracture, [place] Type III   #S/P tracheostomy   CT panscan revealed extensive b/l fascial Type III [place] fractures (bilateral facial fx traversing bilateral orbits, nasal bones, nasal septum, maxillary sinus, pterygoid plates and left zygomatic arch) and soft tissue swelling.    OMFS consulted. Patient underwent (8/1) ORIF of multiple fascial fractures.    ENT consulted for ongoing nasal bleeding s/p rhino rocket. Continues to bleed. Underwent bilateral internal maxillary arteries embolization by neuroendovascular. f/u Dr. Marcum outpatient    Ophthalmology consulted for  b/l orbital fractures, no intervention Ttracheostomy for airway protection, decannulation 8/8   - c/w Unasyn 3g q6h   - Pain control: Tylenol, Oxycodone 5mg q4hr PRN (avoid NSAID)    - Precaution: fall, sinus precautions (open mouth sneezing, no nose blowing), Head of bead elevated @ 30 degrees   - encourage incentive spirometry   - Monitor for acute bleeding      #L optic neuropathy    - Ophthalmology consulted, believed to be chronic, no acute intervention. Recommend outpatient f/u for MRI brain/orbits, heavy metals, B12, folate, syphilis, SARAI, ACE, antiphospholipid antibx     #leukocytosis (resolved)  - WBC downtrending, low grade temp on 8/9might 2/2 recent steroid use and percedex  - WBC wnl, afebrile for 48 hours  - on Unasyn  - monitor vitals     #L ICA occlusion, R ICA stenosis    - 7/26 CTA neck showed Occlusion of L ICA at the L carotid bulb; Occlusion of the distal cervical portion of the left ICA; Severe stenosis at the right carotid bulb.  No evidence of vascular injury, dissection, or extravasation.   - Echo (10/2023): EF 63%, mildly-enlarged LA, no stress-induced wall motion abnormalities, moderate MR, TR, and AR, sclerotic aortic valve, mild MN, borderline pulmonary HTN, mild aortic root calcification   - Vascular consulted. Recommend outpatient w/ Dr. Quigley for surgical planning for possible Right carotid endarterectomy     #CAD w/ prior MI s/p CABG (2000)   #MVR  #HTN   #HLD   - c/w ASA + plavix    - Home med: Metoprolol 50mg qd, losartan 100mg QD, amlodipine 5mg QD, HCTZ 25mg QD, Ezetimibe-Simvastatin 10-10   - While inpt: continue metoprolol 12.5mg bid, losartan 25mg daily, amlodipine 5mg QD, HCTZ 25mg QD   - Continue simvastatin 10mg   - Monitor and adjust as needed      #acute blood loss anemia    - Hgb (8/8/) 8.4   - received 1u PRBC  - transfuse if Hgb < 7  - type and screen      #urinary retention    - c/w flomax 0.4mg qhs  - monitor       #TEO    - Non-compliant with home CPAP   - Hold CPAP while facial injuries heal    -Pain control: Tylenol 650mg q6h, Oxycodone 5mg q4hr PRN     -GI/Bowel Mgmt: Miralax/senna    -Bladder management: voiding spontaneously      -Skin: fascial bruising, ecchymosis b/l orbit, L knee contusion    - Diet: Pureed diet      Precautions / PROPHYLAXIS:  Falls, sinus precautions (open mouth sneezing, no nose blowing), Head of bead elevated @ 30 degrees     - Ortho: Weight bearing status: no weight bearing restrictions       - DVT prophylaxis: Lovenox 30mg q12h      MEDICAL PROGNOSIS: GOOD            REHAB POTENTIAL: GOOD             ESTIMATED DISPOSITION: HOME WITH HOME CARE       [ x ]  The goals of the IRF admission were discussed with the patient and or family member, who agreed             ELOS:  [ x ] 7-14    [    ]  14-21    [    ]  Other    THERAPY ORDERS and INITIAL INDIVIDUALIZED PLAN OF CARE:  This initial individualized interdisciplinary plan of care, which was established by me (the attending physiatrist), is based on elements from the post admission evaluation. The interdisciplinary therapy program is to be at least 3 hrs a day, at least 5 days per week from from physical, occupational and/ or speech therapies as ordered by me below.      [ x  ] P.T. 90 mins. /day at least 5 out of 7 days:  [  x ] superficial  modalities prn, [ x  ] A/AAROM, [ x  ] PREs, [ x  ] transfer training,            [ x  ] progressive ambulation, [x   ] stairs                                               [ x  ] O.T. 90 mins. /day at least 5 out of 7 days::  [ x  ] modalities prn,  [ x  ]A/AAROM, [ x  ] PREs, [  x ] functional transfer training, [ x  ] ADL training           [   ] cognitive/ perceptual eval and training, [   ] splint eval                                                  [   ] S.L.P:  [   ] speech eval, [   ] swallow eval     [   ] Neuropsychology eval     [ x  ] Individualized rec. therapy      RATIONALE FOR INPATIENT ADMISSION - Patient demonstrates the following: (check all that apply)  [X] Medically appropriate for acute rehabilitation admission. Requires interdisiplinary therapy consisting of at least PT and OT, at least 3 hrs. a day at least 5 days a week  [X] Has attainable rehab goals with an appropriate initial discharge plan  [X] Has rehabilitation potential (expected to make a significant improvement within a reasonable period of time)  [X] Requires close medical management by a rehab physician, rehab nursing care,  and comprehensive interdisciplinary team (including PT, OT)    [X] Requires evaluation by a physiatrist at least 3 days a week to evaluate and manage and coordinate rehab and medical problems   ASSESSMENT/PLAN  79 y.o. M with PMH of CAD w/ prior MI s/p CABG (2000), HTN, HLD, MVR, TEO noncompliant with CPAP, recent diagnosis of prostate CA presented on 7/26/2024 to SICU after sustaining injuries from a mechanical fall (+HT, -LOC, -AC).  CT panscan revealed extensive b/l fascial Type III [place] fractures (bilateral facial fx traversing bilateral orbits, nasal bones, nasal septum, maxillary sinus, pterygoid plates and left zygomatic arch) and soft tissue swelling. OMFS consulted. Patient underwent (8/1) ORIF of multiple fascial fractures, bilateral internal maxillary arteries embolization by neuroendovascular.    For rehab of traumatic head trauma sustaining multiple Facial Bone Fracture, [place] Type III, epistaxis S/P Bilateral Internal Maxillary Arteries embolization and ORIF of Facial Bone Fracture, [place] Type III 2/2 mechanical fall      #Traumatic mechanical fall   #Head Trauma   #Multiple Facial Bone Fracture, [place] Type III.   #Epistaxis (resolved)   #S/P Bilateral Internal Maxillary Arteries embolization    #S/P ORIF of Facial Bone Fracture, [place] Type III   #S/P tracheostomy   CT panscan revealed extensive b/l fascial Type III [place] fractures (bilateral facial fx traversing bilateral orbits, nasal bones, nasal septum, maxillary sinus, pterygoid plates and left zygomatic arch) and soft tissue swelling.    OMFS consulted. Patient underwent (8/1) ORIF of multiple fascial fractures.    ENT consulted for ongoing nasal bleeding s/p rhino rocket. Continues to bleed. Underwent bilateral internal maxillary arteries embolization by neuroendovascular. f/u Dr. Marcum outpatient    Ophthalmology consulted for  b/l orbital fractures, no intervention Tracheostomy for airway protection, decannulation 8/8   - c/w Unasyn 3g q6h   - Pain control: Tylenol, Oxycodone 5mg q4hr PRN (avoid NSAID)    - Precaution: fall, sinus precautions (open mouth sneezing, no nose blowing), Head of bead elevated @ 30 degrees   - encourage incentive spirometry   - Monitor for acute bleeding      #L optic neuropathy    - Ophthalmology consulted, believed to be chronic, no acute intervention. Recommend outpatient f/u for MRI brain/orbits, heavy metals, B12, folate, syphilis, SARAI, ACE, antiphospholipid antibx     #leukocytosis (resolved)  - WBC downtrending, low grade temp on 8/9might 2/2 recent steroid use and percedex  - WBC wnl, afebrile for 48 hours  - on Unasyn  - monitor vitals     #L ICA occlusion, R ICA stenosis    - 7/26 CTA neck showed Occlusion of L ICA at the L carotid bulb; Occlusion of the distal cervical portion of the left ICA; Severe stenosis at the right carotid bulb.  No evidence of vascular injury, dissection, or extravasation.   - Echo (10/2023): EF 63%, mildly-enlarged LA, no stress-induced wall motion abnormalities, moderate MR, TR, and AR, sclerotic aortic valve, mild HI, borderline pulmonary HTN, mild aortic root calcification   - Vascular consulted. Recommend outpatient w/ Dr. Quigley for surgical planning for possible Right carotid endarterectomy     #CAD w/ prior MI s/p CABG (2000)   #MVR  #HTN   #HLD   - c/w ASA + Plavix    - Home med: Metoprolol 50mg qd, losartan 100mg QD, amlodipine 5mg QD, HCTZ 25 mg QD, Ezetimibe-Simvastatin 10-10   - While inpt: continue metoprolol 12.5 mg bid, losartan 25 mg daily, amlodipine 5 mg QD, HCTZ 25mg QD   - Continue simvastatin 10 mg   - Monitor and adjust as needed      #acute blood loss anemia    - Hgb (8/8/) 8.4   - received 1u PRBC  - transfuse if Hb < 7  - type and screen      #urinary retention    - c/w Flomax 0.4 mg qhs  - monitor       #TEO    - Noncompliant with home CPAP   - Hold CPAP while facial injuries heal    -Pain control: Tylenol 650 mg q6h, Oxycodone 5 mg q4hr PRN     -GI/Bowel Mgmt: Miralax/senna    -Bladder management: voiding spontaneously      -Skin: fascial bruising, ecchymosis b/l orbit, L knee contusion    - Diet: Pureed diet      Precautions / PROPHYLAXIS:  Falls, sinus precautions (open mouth sneezing, no nose blowing), Head of bed elevated @ 30 degrees     - Ortho: Weight bearing status: no weight bearing restrictions       - DVT prophylaxis: Lovenox 30 mg q12h      MEDICAL PROGNOSIS: GOOD            REHAB POTENTIAL: GOOD             ESTIMATED DISPOSITION: HOME WITH HOME CARE       [ x ]  The goals of the IRF admission were discussed with the patient and or family member, who agreed             ELOS:  [ x ] 7-14 days   [    ]  14-21    [    ]  Other    THERAPY ORDERS and INITIAL INDIVIDUALIZED PLAN OF CARE:  This initial individualized interdisciplinary plan of care, which was established by me (the attending physiatrist), is based on elements from the post admission evaluation. The interdisciplinary therapy program is to be at least 3 hrs a day, at least 5 days per week from from physical, occupational and/ or speech therapies as ordered by me below.      [ x  ] P.T. 90 mins. /day at least 5 out of 7 days:  [  x ] superficial  modalities prn, [ x  ] A/AAROM, [ x  ] PREs, [ x  ] transfer training,            [ x  ] progressive ambulation, [x   ] stairs                                               [ x  ] O.T. 90 mins. /day at least 5 out of 7 days::  [ x  ] modalities prn,  [ x  ]A/AAROM, [ x  ] PREs, [  x ] functional transfer training, [ x  ] ADL training           [   ] cognitive/ perceptual eval and training, [   ] splint eval                                                  [   ] S.L.P:  [   ] speech eval, [   ] swallow eval     [   ] Neuropsychology eval     [ x  ] Individualized rec. therapy      RATIONALE FOR INPATIENT ADMISSION - Patient demonstrates the following: (check all that apply)  [X] Medically appropriate for acute rehabilitation admission. Requires interdisiplinary therapy consisting of at least PT and OT, at least 3 hrs. a day at least 5 days a week  [X] Has attainable rehab goals with an appropriate initial discharge plan  [X] Has rehabilitation potential (expected to make a significant improvement within a reasonable period of time)  [X] Requires close medical management by a rehab physician, rehab nursing care,  and comprehensive interdisciplinary team (including PT, OT)    [X] Requires evaluation by a physiatrist at least 3 days a week to evaluate and manage and coordinate rehab and medical problems   ASSESSMENT/PLAN  79 y.o. M with PMH of CAD w/ prior MI s/p CABG (2000), HTN, HLD, MVR, TEO noncompliant with CPAP, recent diagnosis of prostate CA presented on 7/26/2024 to SICU after sustaining injuries from a mechanical fall (+HT, -LOC, -AC).  CT panscan revealed extensive b/l facial Type III [place] fractures (bilateral facial fx traversing bilateral orbits, nasal bones, nasal septum, maxillary sinus, pterygoid plates and left zygomatic arch) and soft tissue swelling. OMFS consulted. Patient underwent (8/1) ORIF of multiple facial fractures, bilateral internal maxillary arteries embolization by neuroendovascular.    Rehab for traumatic head trauma sustaining multiple Facial Bone Fractures, [place] Type III, epistaxis S/P Bilateral Internal Maxillary Arteries embolization and ORIF of Facial Bone Fracture, [place] Type III 2/2 mechanical fall      #Traumatic mechanical fall   #Head Trauma   #Multiple Facial Bone Fracture, [place] Type III.   #Epistaxis (resolved)   #S/P Bilateral Internal Maxillary Arteries embolization    #S/P ORIF of Facial Bone Fracture, [place] Type III   #S/P tracheostomy   CT panscan revealed extensive b/l fascial Type III [place] fractures (bilateral facial fx traversing bilateral orbits, nasal bones, nasal septum, maxillary sinus, pterygoid plates and left zygomatic arch) and soft tissue swelling.    OMFS consulted. Patient underwent (8/1) ORIF of multiple fascial fractures.    ENT consulted for ongoing nasal bleeding s/p rhino rocket. Continues to bleed. Underwent bilateral internal maxillary arteries embolization by neuroendovascular. f/u Dr. Marcum outpatient    Ophthalmology consulted for  b/l orbital fractures, no intervention Tracheostomy for airway protection, decannulation 8/8/2024  - c/w Unasyn 3g q6h   - Pain control: Tylenol, oxycodone 5 mg q4hr PRN (avoid NSAID)    - Precaution: fall, sinus precautions (open mouth sneezing, no nose blowing), Head of bed elevated @ 30 degrees   - encourage incentive spirometry   - Monitor for acute bleeding      #L optic neuropathy    - Ophthalmology consulted, believed to be chronic, no acute intervention. Recommend outpatient f/u for MRI brain/orbits, heavy metals, B12, folate, syphilis, SARAI, ACE, antiphospholipid antibx     #leukocytosis (resolved)  - WBC downtrending, low grade temp on 8/9might 2/2 recent steroid use and Percedex  - WBC wnl, afebrile for 48 hours  - on Unasyn  - monitor vitals     #L ICA occlusion, R ICA stenosis    - 7/26 CTA neck showed Occlusion of L ICA at the L carotid bulb; Occlusion of the distal cervical portion of the left ICA; Severe stenosis at the right carotid bulb.  No evidence of vascular injury, dissection, or extravasation.   - Echo (10/2023): EF 63%, mildly-enlarged LA, no stress-induced wall motion abnormalities, moderate MR, TR, and AR, sclerotic aortic valve, mild AL, borderline pulmonary HTN, mild aortic root calcification   - Vascular consulted. Recommend outpatient F/U w/ Dr. Quigley for surgical planning for possible Right carotid endarterectomy     #CAD w/ prior MI s/p CABG (2000)   #MVR  #HTN   #HLD   - c/w ASA + Plavix    - Home med: Metoprolol 50 mg qd, losartan 100 mg QD, amlodipine 5 mg QD, HCTZ 25 mg QD, Ezetimibe-Simvastatin 10-10   - While inpt: continue metoprolol 12.5 mg bid, losartan 25 mg daily, amlodipine 5 mg QD, HCTZ 25 mg QD   - Continue simvastatin 10 mg   - Monitor and adjust as needed      #acute blood loss anemia    - Hb (8/8/2024) 8.4   - received 1u PRBC  - transfuse if Hb < 7  - type and screen      #urinary retention    - c/w Flomax 0.4 mg qhs  - monitor       #TEO    - Noncompliant with home CPAP   - Hold CPAP while facial injuries heal    -Pain control: Tylenol 650 mg q6h, Oxycodone 5 mg q4hr PRN     -GI/Bowel Mgmt: Miralax/senna    -Bladder management: voiding spontaneously      -Skin: fascial bruising, ecchymosis b/l orbit, L knee contusion    - Diet: Pureed diet      Precautions / PROPHYLAXIS:  Falls, sinus precautions (open mouth sneezing, no nose blowing), Head of bed elevated @ 30 degrees     - Ortho: Weight bearing status: no weight bearing restrictions       - DVT prophylaxis: Lovenox 30 mg q12h      MEDICAL PROGNOSIS: GOOD            REHAB POTENTIAL: GOOD             ESTIMATED DISPOSITION: HOME WITH HOME CARE       [ x ]  The goals of the IRF admission were discussed with the patient and or family member, who agreed             ELOS:  [ x ] 7-14 days   [    ]  14-21    [    ]  Other    THERAPY ORDERS and INITIAL INDIVIDUALIZED PLAN OF CARE:  This initial individualized interdisciplinary plan of care, which was established by me (the attending physiatrist), is based on elements from the post admission evaluation. The interdisciplinary therapy program is to be at least 3 hrs a day, at least 5 days per week from from physical, occupational and/ or speech therapies as ordered by me below.      [ x  ] P.T. 90 mins. /day at least 5 out of 7 days:  [  x ] superficial  modalities prn, [ x  ] A/AAROM, [ x  ] PREs, [ x  ] transfer training,            [ x  ] progressive ambulation, [x   ] stairs                                               [ x  ] O.T. 90 mins. /day at least 5 out of 7 days::  [ x  ] modalities prn,  [ x  ]A/AAROM, [ x  ] PREs, [  x ] functional transfer training, [ x  ] ADL training           [   ] cognitive/ perceptual eval and training, [   ] splint eval                                                  [   ] S.L.P:  [   ] speech eval, [   ] swallow eval     [   ] Neuropsychology eval     [ x  ] Individualized rec. therapy      RATIONALE FOR INPATIENT ADMISSION - Patient demonstrates the following: (check all that apply)  [X] Medically appropriate for acute rehabilitation admission. Requires interdisiplinary therapy consisting of at least PT and OT, at least 3 hrs. a day at least 5 days a week  [X] Has attainable rehab goals with an appropriate initial discharge plan  [X] Has rehabilitation potential (expected to make a significant improvement within a reasonable period of time)  [X] Requires close medical management by a rehab physician, rehab nursing care,  and comprehensive interdisciplinary team (including PT, OT)    [X] Requires evaluation by a physiatrist at least 3 days a week to evaluate and manage and coordinate rehab and medical problems

## 2024-08-09 NOTE — H&P ADULT - REASON FOR ADMISSION
For rehab of traumatic head trauma sustaining multiple Facial Bone Fracture, Le Fort Type III, epistaxis S/P Bilateral Internal Maxillary Arteries embolization and ORIF of Facial Bone Fracture, Le Fort Type III 2/2 mechanical fall Rehab for traumatic head trauma sustaining multiple Facial Bone Fracture, Le Fort Type III, epistaxis S/P Bilateral Internal Maxillary Arteries embolization and ORIF of Facial Bone Fracture, Le Fort Type III 2/2 mechanical fall

## 2024-08-09 NOTE — H&P ADULT - NSHPLABSRESULTS_GEN_ALL_CORE
8.4    10.88 )-----------( 285      ( 08 Aug 2024 22:01 )             25.3     08-08    137  |  100  |  20  ----------------------------<  112<H>  4.4   |  27  |  0.8    Ca    8.0<L>      08 Aug 2024 22:01  Phos  3.0     08-08  Mg     2.1     08-08        Urinalysis Basic - ( 08 Aug 2024 22:01 )    Color: x / Appearance: x / SG: x / pH: x  Gluc: 112 mg/dL / Ketone: x  / Bili: x / Urobili: x   Blood: x / Protein: x / Nitrite: x   Leuk Esterase: x / RBC: x / WBC x   Sq Epi: x / Non Sq Epi: x / Bacteria: x      POCT Blood Glucose.: 114 mg/dL (08-09-24 @ 11:16)  POCT Blood Glucose.: 129 mg/dL (08-09-24 @ 07:39)  POCT Blood Glucose.: 141 mg/dL (08-08-24 @ 21:18)  POCT Blood Glucose.: 133 mg/dL (08-08-24 @ 16:31)  POCT Blood Glucose.: 203 mg/dL (08-08-24 @ 11:58)  POCT Blood Glucose.: 118 mg/dL (08-08-24 @ 07:22)                CT Head No Cont:   ACC: [id number] EXAM:  CT BRAIN   ORDERED BY: ABE BRITO     PROCEDURE DATE:  08/01/2024          INTERPRETATION:  CLINICAL INDICATION: Seizure-like activity. Recent   tracheostomy and ORIF of bilateral facial fractures via bicoronal,   transconjunctival and transoral approaches.    TECHNIQUE: CT of the head was performed without the administration of   intravenous contrast.    COMPARISON: CT head 7/26/2024..    FINDINGS:    The ventricles and sulci are appropriate for the age.    Thereis no bleed. Gray-white matter differentiation is maintained. No   midline shift. No mass appreciated on this noncontrast CT.    Mild patchy low density changes in the white matter is noted, likely   microvascular. Stable vascular calcifications.    Extensive maxillofacial fractures without status post ORIF via bicoronal,   transconjunctival and transoral approaches. See concurrently performed CT   maxillofacial for full report.    Surgical density noted in left infratemporal fossa. Correlate.    IMPRESSION:    No acute intracranial pathology on this noncontrast CT.  See concurrent CT maxillofacial for postoperative ORIF report. 8.5    9.39  )-----------( 281      ( 10 Aug 2024 05:41 )             26.0     08-09    137  |  100  |  22<H>  ----------------------------<  104<H>  3.7   |  24  |  0.8    Ca    8.0<L>      09 Aug 2024 20:00  Phos  2.8     08-09  Mg     1.9     08-09        Urinalysis Basic - ( 09 Aug 2024 20:00 )    Color: x / Appearance: x / SG: x / pH: x  Gluc: 104 mg/dL / Ketone: x  / Bili: x / Urobili: x   Blood: x / Protein: x / Nitrite: x   Leuk Esterase: x / RBC: x / WBC x   Sq Epi: x / Non Sq Epi: x / Bacteria: x      POCT Blood Glucose.: 111 mg/dL (08-11-24 @ 11:06)  POCT Blood Glucose.: 111 mg/dL (08-11-24 @ 07:29)  POCT Blood Glucose.: 119 mg/dL (08-10-24 @ 20:27)  POCT Blood Glucose.: 165 mg/dL (08-10-24 @ 16:40)  POCT Blood Glucose.: 124 mg/dL (08-10-24 @ 12:07)  POCT Blood Glucose.: 130 mg/dL (08-10-24 @ 07:33)  POCT Blood Glucose.: 122 mg/dL (08-09-24 @ 21:01)  POCT Blood Glucose.: 136 mg/dL (08-09-24 @ 16:55)  POCT Blood Glucose.: 114 mg/dL (08-09-24 @ 11:16)  POCT Blood Glucose.: 129 mg/dL (08-09-24 @ 07:39)  POCT Blood Glucose.: 141 mg/dL (08-08-24 @ 21:18)  POCT Blood Glucose.: 133 mg/dL (08-08-24 @ 16:31)          CT Head No Cont:   ACC: [id number] EXAM:  CT BRAIN   ORDERED BY: ABE BRITO     PROCEDURE DATE:  08/01/2024          INTERPRETATION:  CLINICAL INDICATION: Seizure-like activity. Recent   tracheostomy and ORIF of bilateral facial fractures via bicoronal,   transconjunctival and transoral approaches.    TECHNIQUE: CT of the head was performed without the administration of   intravenous contrast.    COMPARISON: CT head 7/26/2024..    FINDINGS:    The ventricles and sulci are appropriate for the age.    Thereis no bleed. Gray-white matter differentiation is maintained. No   midline shift. No mass appreciated on this noncontrast CT.    Mild patchy low density changes in the white matter is noted, likely   microvascular. Stable vascular calcifications.    Extensive maxillofacial fractures without status post ORIF via bicoronal,   transconjunctival and transoral approaches. See concurrently performed CT   maxillofacial for full report.    Surgical density noted in left infratemporal fossa. Correlate.    IMPRESSION:    No acute intracranial pathology on this noncontrast CT.  See concurrent CT maxillofacial for postoperative ORIF report.

## 2024-08-09 NOTE — H&P ADULT - NSHPSOCIALHISTORY_GEN_ALL_CORE
LS: Lives with his wife in a private house with 5 GERALDINE and 14 flight of stairs to the 2nd floor bedroom.    Soc: no smoking, recreational drugs use, social alcohol use LS: Lives with his wife in a private house with 5 GERALDINE and 14 flight of stairs to the 2nd floor bedroom.    Soc: no smoking, recreational drug use, social alcohol use

## 2024-08-09 NOTE — H&P ADULT - HISTORY OF PRESENT ILLNESS
79M Thai speaking  with PMH of CAD w/ prior MI s/p CABG (2000), HTN, HLD, MVR, TEO non compliant with CPAP, recent diagnosis of prostate CA presented on 7/26/2024 to SICU after sustaining injuries from a mechanical fall (+HT, -LOC, -AC). Patient was walking out of his house to see his Cardiologist (Dr. Basurto) when he tripped down his front 2 steps and landed forward on his face onto the concrete.  CT panscan revealed extensive b/l fascial Type III Le Fort fractures (bilateral facial fx traversing bilateral orbits, nasal bones, nasal septum, maxillary sinus, pterygoid plates and left zygomatic arch) and soft tissue swelling. OMFS consulted. Patient underwent (8/1) ORIF of multiple fascial fractures. ENT consulted for ongoing nasal bleeding s/p rhino rocket. Continues to bleed. Underwent bilateral internal maxillary arteries embolization by neuroendovascular. 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       During patients hospital course, he developed delirium and violent behavior, code grey called and received Zyprexa and precedex. Also transfuses 1u PRBC 2/2 acute blood loss anemia. Additionally, patient required tracheostomy (8/1) for airway protection, decannulation 8/8. Labs significant for leukocytosis that continues to downtrend, no active infection identified.      During their stay, the patient was evaluated by physical and occupational therapy. Prior to admission, patient was independent with ambulation and ADLs. The most recent evaluated functional status is Kevin for bed mobility, modA for transfers, modA for ambulation x30 feet with RW, set up for upper body dressing, and contact guard for lower body dressing. Patient was deemed a good acute rehab candidate due to decline in functional status and ability to carry out activities of daily living. Patient is able to tolerate 3 hours of therapy 5-6 days a week and is motivated to participate.     LS: Lives with his wife in a private house with 5 GERALDINE and 14 flight of stairs to the 2nd floor bedroom.    PLOF: Independent with ADLs and IADLs. Ambulated independently without any AD.        79M Azeri speaking  with PMH of CAD w/ prior MI s/p CABG (2000), HTN, HLD, MVR, TEO non compliant with CPAP, recent diagnosis of prostate CA presented on 7/26/2024 to SICU after sustaining injuries from a mechanical fall (+HT, -LOC, -AC). Patient was walking out of his house to see his Cardiologist (Dr. Basurto) when he tripped down his front 2 steps and landed forward on his face onto the concrete.  CT panscan revealed extensive b/l fascial Type III Le Fort fractures (bilateral facial fx traversing bilateral orbits, nasal bones, nasal septum, maxillary sinus, pterygoid plates and left zygomatic arch) and soft tissue swelling. OMFS consulted. Patient underwent (8/1) ORIF of multiple fascial fractures. ENT consulted for ongoing nasal bleeding s/p rhino rocket. Continues to bleed. Underwent bilateral internal maxillary arteries embolization by neuroendovascular. 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       During patients hospital course, he developed delirium and violent behavior, code grey called and received Zyprexa and precedex. Also transfuses 1u PRBC 2/2 acute blood loss anemia. Additionally, patient required tracheostomy (8/1) for airway protection, decannulation 8/8. Labs significant for leukocytosis that continues to downtrend, no active infection identified.  On 8/9, just prior to discharge to acute rehabilitation patient spike a fever of 100.8; therefore admission was post-poned. Repeat vitals have been stable, labs reviewed. Patient has not spike a fever in the past 48 hours and is medically stable to admit to acute rehabilitation on 8/11    During their stay, the patient was evaluated by physical and occupational therapy. Prior to admission, patient was independent with ambulation and ADLs. The most recent evaluated functional status is Kevin for bed mobility, modA for transfers, modA for ambulation x30 feet with RW, set up for upper body dressing, and contact guard for lower body dressing. Patient was deemed a good acute rehab candidate due to decline in functional status and ability to carry out activities of daily living. Patient is able to tolerate 3 hours of therapy 5-6 days a week and is motivated to participate.     LS: Lives with his wife in a private house with 5 GERALDINE and 14 flight of stairs to the 2nd floor bedroom.    PLOF: Independent with ADLs and IADLs. Ambulated independently without any AD.        79 y.o. M [place] speaking  with PMH of CAD w/ prior MI s/p CABG (2000), HTN, HLD, MVR, TEO noncompliant with CPAP, recent diagnosis of prostate CA presented on 7/26/2024 to SICU after sustaining injuries from a mechanical fall (+HT, -LOC, -AC). Patient was walking out of his house to see his Cardiologist (Dr. Basurto) when he tripped down his front 2 steps and landed forward on his face onto the concrete.  CT panscan revealed extensive b/l fascial Type III Le Fort fractures (bilateral facial fx traversing bilateral orbits, nasal bones, nasal septum, maxillary sinus, pterygoid plates and left zygomatic arch) and soft tissue swelling. OMFS consulted. Patient underwent (8/1) ORIF of multiple fascial fractures. ENT consulted for ongoing nasal bleeding s/p rhino rocket. Continues to bleed. Underwent bilateral internal maxillary arteries embolization by neuroendovascular. 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       During patient's hospital course, he developed delirium and violent behavior, code grey called and received Zyprexa and Precedex. Also transfuses 1u PRBC 2/2 acute blood loss anemia. Additionally, patient required tracheostomy (8/1) for airway protection, decannulation 8/8/2024. Labs significant for leukocytosis that continues to downtrend, no active infection identified.  On 8/9/2024, just prior to discharge to acute rehabilitation patient spike a fever of 100.8; therefore admission was postponed. Repeat vitals have been stable, labs reviewed. Patient has not spike a fever in the past 48 hours and is medically stable to admit to acute rehabilitation on 8/11/2024    During their stay, the patient was evaluated by physical and occupational therapy. Prior to admission, patient was independent with ambulation and ADLs. The most recent evaluated functional status is Kevin for bed mobility, modA for transfers, modA for ambulation x30 feet with RW, set up for upper body dressing, and contact guard for lower body dressing. Patient was deemed a good acute rehab candidate due to decline in functional status and ability to carry out activities of daily living. Patient is able to tolerate 3 hours of therapy 5-6 days a week and is motivated to participate.     LS: Lives with his wife in a private house with 5 GERALDINE and 14 flight of stairs to the 2nd floor bedroom.    PLOF: Independent with ADLs and IADLs. Ambulated independently without any AD.        August 11, 2024 admission    79 y.o. M Liberian speaking  with PMH of CAD w/ prior MI s/p CABG (2000), HTN, HLD, MVR, TEO noncompliant with CPAP, recent diagnosis of prostate CA presented on 7/26/2024 to SICU after sustaining injuries from a mechanical fall (+HT, -LOC, -AC). Patient was walking out of his house to see his Cardiologist (Dr. Basurto) when he tripped down his front 2 steps and landed forward on his face onto the concrete.  CT panscan revealed extensive b/l fascial Type III Le [place] fractures (bilateral facial fx traversing bilateral orbits, nasal bones, nasal septum, maxillary sinus, pterygoid plates and left zygomatic arch) and soft tissue swelling. OMFS consulted. Patient underwent (8/1) ORIF of multiple fascial fractures. ENT consulted for ongoing nasal bleeding s/p rhino rocket. Continues to bleed. Underwent bilateral internal maxillary arteries embolization by neuroendovascular. 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       During patient's hospital course, he developed delirium and violent behavior, code grey called and received Zyprexa and Precedex. Also transfuses 1u PRBC 2/2 acute blood loss anemia. Additionally, patient required tracheostomy (8/1) for airway protection, decannulation 8/8/2024. Labs significant for leukocytosis that continues to downtrend, no active infection identified.  On 8/9/2024, just prior to discharge to acute rehabilitation patient spike a fever of 100.8; therefore admission was postponed. Repeat vitals have been stable, labs reviewed. Patient has not spike a fever in the past 48 hours and is medically stable to admit to acute rehabilitation on 8/11/2024    During their stay, the patient was evaluated by physical and occupational therapy. Prior to admission, patient was independent with ambulation and ADLs. The most recent evaluated functional status is Kevin for bed mobility, modA for transfers, modA for ambulation x30 feet with RW, set up for upper body dressing, and contact guard for lower body dressing. Patient was deemed a good acute rehab candidate due to decline in functional status and ability to carry out activities of daily living. Patient is able to tolerate 3 hours of therapy 5-6 days a week and is motivated to participate.     LS: Lives with his wife in a private house with 5 GERALDINE and 14 flight of stairs to the 2nd floor bedroom.    PLOF: Independent with ADLs and IADLs. Ambulated independently without any AD.

## 2024-08-09 NOTE — H&P ADULT - NSHPREVIEWOFSYSTEMS_GEN_ALL_CORE
Constiutional:    [ x  ] WNL           [   ] poor appetite   [   ] insomnia   [   ] tired   Cardio:                [x   ] WNL           [   ] CP   [   ] MCDANIEL   [   ] palpitations               Resp:                   [ x  ] WNL           [   ] SOB   [   ] cough   [   ] wheezing   GI:                        [ x  ] WNL           [   ] constipation   [   ] diarrhea   [   ] abdominal pain   [   ] nausea   [   ] emesis                                :                      [  x ] WNL           [   ] SOLORZANO  [   ] dysuria   [   ] difficulty voiding             Endo:                   [  x ] WNL          [   ] polyuria   [   ] temperature intolerance                 Skin:                     [   ] WNL          [   ] pain   [ x  ] wound   [   ] rash   MSK:                    [x   ] WNL          [   ] muscle pain   [   ] joint pain/ stiffness   [   ] muscle tenderness   [   ] swelling   Neuro:                 [   ] WNL          [   ] HA   [   x] change in vision   [x   ] tremor   [ x  ] weakness   [   ]dysphagia              Cognitive:           [ x  ] WNL           [   ]confusion      Psych:                  [ x  ] WNL           [   ] hallucinations   [   ]agitation   [   ] delusion   [   ]depression Constiutional:    [ x  ] WNL           [   ] poor appetite   [   ] insomnia   [   ] tired   Cardio:                [x   ] WNL           [   ] CP   [   ] MCDANIEL   [   ] palpitations               Resp:                   [ x  ] WNL           [   ] SOB   [   ] cough   [   ] wheezing   GI:                        [ x  ] WNL           [   ] constipation   [   ] diarrhea   [   ] abdominal pain   [   ] nausea   [   ] emesis                                :                      [  x ] WNL           [   ] SOLORZANO  [   ] dysuria   [   ] difficulty voiding             Endo:                   [  x ] WNL          [   ] polyuria   [   ] temperature intolerance                 Skin:                     [   ] WNL          [   ] pain   [ x  ] wound   [   ] rash   MSK:                    [x   ] WNL          [   ] muscle pain   [   ] joint pain/ stiffness   [   ] muscle tenderness   [   ] swelling   Neuro:                 [   ] WNL          [   ] HA   [   x] change in vision   [x   ] tremor: improving   [ x  ] weakness   [   ]dysphagia              Cognitive:           [ x  ] WNL           [   ]confusion      Psych:                  [ x  ] WNL           [   ] hallucinations   [   ]agitation   [   ] delusion   [   ]depression

## 2024-08-11 ENCOUNTER — INPATIENT (INPATIENT)
Facility: HOSPITAL | Age: 79
LOS: 8 days | Discharge: HOME | DRG: 560 | End: 2024-08-20
Attending: PHYSICAL MEDICINE & REHABILITATION | Admitting: PHYSICAL MEDICINE & REHABILITATION
Payer: MEDICARE

## 2024-08-11 VITALS
HEART RATE: 88 BPM | RESPIRATION RATE: 17 BRPM | OXYGEN SATURATION: 94 % | WEIGHT: 175.05 LBS | SYSTOLIC BLOOD PRESSURE: 125 MMHG | DIASTOLIC BLOOD PRESSURE: 60 MMHG | HEIGHT: 63 IN

## 2024-08-11 DIAGNOSIS — I25.701 ATHEROSCLEROSIS OF CORONARY ARTERY BYPASS GRAFT(S), UNSPECIFIED, WITH ANGINA PECTORIS WITH DOCUMENTED SPASM: Chronic | ICD-10-CM

## 2024-08-11 DIAGNOSIS — Z98.890 OTHER SPECIFIED POSTPROCEDURAL STATES: Chronic | ICD-10-CM

## 2024-08-11 DIAGNOSIS — I25.810 ATHEROSCLEROSIS OF CORONARY ARTERY BYPASS GRAFT(S) WITHOUT ANGINA PECTORIS: Chronic | ICD-10-CM

## 2024-08-11 DIAGNOSIS — S02.413A LEFORT III FRACTURE, INITIAL ENCOUNTER FOR CLOSED FRACTURE: ICD-10-CM

## 2024-08-11 PROCEDURE — 90791 PSYCH DIAGNOSTIC EVALUATION: CPT

## 2024-08-11 PROCEDURE — 97129 THER IVNTJ 1ST 15 MIN: CPT | Mod: GO

## 2024-08-11 PROCEDURE — 80053 COMPREHEN METABOLIC PANEL: CPT

## 2024-08-11 PROCEDURE — 86038 ANTINUCLEAR ANTIBODIES: CPT

## 2024-08-11 PROCEDURE — 97535 SELF CARE MNGMENT TRAINING: CPT | Mod: GO

## 2024-08-11 PROCEDURE — 84443 ASSAY THYROID STIM HORMONE: CPT

## 2024-08-11 PROCEDURE — 86146 BETA-2 GLYCOPROTEIN ANTIBODY: CPT

## 2024-08-11 PROCEDURE — 92507 TX SP LANG VOICE COMM INDIV: CPT | Mod: GN

## 2024-08-11 PROCEDURE — 92610 EVALUATE SWALLOWING FUNCTION: CPT | Mod: GN

## 2024-08-11 PROCEDURE — 82746 ASSAY OF FOLIC ACID SERUM: CPT

## 2024-08-11 PROCEDURE — 83735 ASSAY OF MAGNESIUM: CPT

## 2024-08-11 PROCEDURE — 92523 SPEECH SOUND LANG COMPREHEN: CPT | Mod: GN

## 2024-08-11 PROCEDURE — 82607 VITAMIN B-12: CPT

## 2024-08-11 PROCEDURE — 85613 RUSSELL VIPER VENOM DILUTED: CPT

## 2024-08-11 PROCEDURE — 97116 GAIT TRAINING THERAPY: CPT | Mod: GP

## 2024-08-11 PROCEDURE — 86147 CARDIOLIPIN ANTIBODY EA IG: CPT

## 2024-08-11 PROCEDURE — 86780 TREPONEMA PALLIDUM: CPT

## 2024-08-11 PROCEDURE — 96132 NRPSYC TST EVAL PHYS/QHP 1ST: CPT

## 2024-08-11 PROCEDURE — 97130 THER IVNTJ EA ADDL 15 MIN: CPT | Mod: GO

## 2024-08-11 PROCEDURE — 82300 ASSAY OF CADMIUM: CPT

## 2024-08-11 PROCEDURE — 83825 ASSAY OF MERCURY: CPT

## 2024-08-11 PROCEDURE — 90832 PSYTX W PT 30 MINUTES: CPT

## 2024-08-11 PROCEDURE — 82164 ANGIOTENSIN I ENZYME TEST: CPT

## 2024-08-11 PROCEDURE — 99231 SBSQ HOSP IP/OBS SF/LOW 25: CPT

## 2024-08-11 PROCEDURE — 97112 NEUROMUSCULAR REEDUCATION: CPT | Mod: GO

## 2024-08-11 PROCEDURE — 85025 COMPLETE CBC W/AUTO DIFF WBC: CPT

## 2024-08-11 PROCEDURE — 82962 GLUCOSE BLOOD TEST: CPT

## 2024-08-11 PROCEDURE — 36415 COLL VENOUS BLD VENIPUNCTURE: CPT

## 2024-08-11 PROCEDURE — 97110 THERAPEUTIC EXERCISES: CPT | Mod: GP

## 2024-08-11 PROCEDURE — 83655 ASSAY OF LEAD: CPT

## 2024-08-11 PROCEDURE — 97166 OT EVAL MOD COMPLEX 45 MIN: CPT | Mod: GO

## 2024-08-11 PROCEDURE — 97530 THERAPEUTIC ACTIVITIES: CPT | Mod: GO

## 2024-08-11 PROCEDURE — 85730 THROMBOPLASTIN TIME PARTIAL: CPT

## 2024-08-11 RX ADMIN — Medication 5 MILLIGRAM(S): at 21:03

## 2024-08-11 RX ADMIN — METOPROLOL TARTRATE 12.5 MILLIGRAM(S): 100 TABLET ORAL at 18:24

## 2024-08-11 RX ADMIN — Medication 1 APPLICATION(S): at 18:23

## 2024-08-11 RX ADMIN — Medication 10 MILLIGRAM(S): at 21:03

## 2024-08-11 RX ADMIN — AMPICILLIN SODIUM AND SULBACTAM SODIUM 200 GRAM(S): 1; .5 INJECTION, POWDER, FOR SOLUTION INTRAMUSCULAR; INTRAVENOUS at 18:31

## 2024-08-11 RX ADMIN — TAMSULOSIN HYDROCHLORIDE 0.4 MILLIGRAM(S): 0.4 CAPSULE ORAL at 21:03

## 2024-08-11 RX ADMIN — CHLORHEXIDINE GLUCONATE 15 MILLILITER(S): 40 SOLUTION TOPICAL at 18:32

## 2024-08-11 RX ADMIN — Medication 50 MILLIGRAM(S): at 21:03

## 2024-08-11 RX ADMIN — ENOXAPARIN SODIUM 30 MILLIGRAM(S): 100 INJECTION SUBCUTANEOUS at 18:24

## 2024-08-11 NOTE — PATIENT PROFILE ADULT - FALL HARM RISK - HARM RISK INTERVENTIONS

## 2024-08-12 LAB
ALBUMIN SERPL ELPH-MCNC: 3.4 G/DL — LOW (ref 3.5–5.2)
ALP SERPL-CCNC: 85 U/L — SIGNIFICANT CHANGE UP (ref 30–115)
ALT FLD-CCNC: 36 U/L — SIGNIFICANT CHANGE UP (ref 0–41)
ANION GAP SERPL CALC-SCNC: 11 MMOL/L — SIGNIFICANT CHANGE UP (ref 7–14)
AST SERPL-CCNC: 21 U/L — SIGNIFICANT CHANGE UP (ref 0–41)
BASOPHILS # BLD AUTO: 0.05 K/UL — SIGNIFICANT CHANGE UP (ref 0–0.2)
BASOPHILS NFR BLD AUTO: 0.6 % — SIGNIFICANT CHANGE UP (ref 0–1)
BILIRUB SERPL-MCNC: 0.5 MG/DL — SIGNIFICANT CHANGE UP (ref 0.2–1.2)
BUN SERPL-MCNC: 17 MG/DL — SIGNIFICANT CHANGE UP (ref 10–20)
CALCIUM SERPL-MCNC: 8.4 MG/DL — SIGNIFICANT CHANGE UP (ref 8.4–10.5)
CHLORIDE SERPL-SCNC: 101 MMOL/L — SIGNIFICANT CHANGE UP (ref 98–110)
CO2 SERPL-SCNC: 25 MMOL/L — SIGNIFICANT CHANGE UP (ref 17–32)
CREAT SERPL-MCNC: 0.8 MG/DL — SIGNIFICANT CHANGE UP (ref 0.7–1.5)
EGFR: 90 ML/MIN/1.73M2 — SIGNIFICANT CHANGE UP
EOSINOPHIL # BLD AUTO: 0.25 K/UL — SIGNIFICANT CHANGE UP (ref 0–0.7)
EOSINOPHIL NFR BLD AUTO: 3 % — SIGNIFICANT CHANGE UP (ref 0–8)
GLUCOSE BLDC GLUCOMTR-MCNC: 111 MG/DL — HIGH (ref 70–99)
GLUCOSE SERPL-MCNC: 103 MG/DL — HIGH (ref 70–99)
HCT VFR BLD CALC: 28.2 % — LOW (ref 42–52)
HGB BLD-MCNC: 9.1 G/DL — LOW (ref 14–18)
IMM GRANULOCYTES NFR BLD AUTO: 0.7 % — HIGH (ref 0.1–0.3)
LYMPHOCYTES # BLD AUTO: 1.47 K/UL — SIGNIFICANT CHANGE UP (ref 1.2–3.4)
LYMPHOCYTES # BLD AUTO: 17.4 % — LOW (ref 20.5–51.1)
MAGNESIUM SERPL-MCNC: 2.2 MG/DL — SIGNIFICANT CHANGE UP (ref 1.8–2.4)
MCHC RBC-ENTMCNC: 30.3 PG — SIGNIFICANT CHANGE UP (ref 27–31)
MCHC RBC-ENTMCNC: 32.3 G/DL — SIGNIFICANT CHANGE UP (ref 32–37)
MCV RBC AUTO: 94 FL — SIGNIFICANT CHANGE UP (ref 80–94)
MONOCYTES # BLD AUTO: 0.48 K/UL — SIGNIFICANT CHANGE UP (ref 0.1–0.6)
MONOCYTES NFR BLD AUTO: 5.7 % — SIGNIFICANT CHANGE UP (ref 1.7–9.3)
NEUTROPHILS # BLD AUTO: 6.15 K/UL — SIGNIFICANT CHANGE UP (ref 1.4–6.5)
NEUTROPHILS NFR BLD AUTO: 72.6 % — SIGNIFICANT CHANGE UP (ref 42.2–75.2)
NRBC # BLD: 0 /100 WBCS — SIGNIFICANT CHANGE UP (ref 0–0)
PLATELET # BLD AUTO: 376 K/UL — SIGNIFICANT CHANGE UP (ref 130–400)
PMV BLD: 10 FL — SIGNIFICANT CHANGE UP (ref 7.4–10.4)
POTASSIUM SERPL-MCNC: 4.5 MMOL/L — SIGNIFICANT CHANGE UP (ref 3.5–5)
POTASSIUM SERPL-SCNC: 4.5 MMOL/L — SIGNIFICANT CHANGE UP (ref 3.5–5)
PROT SERPL-MCNC: 6.1 G/DL — SIGNIFICANT CHANGE UP (ref 6–8)
RBC # BLD: 3 M/UL — LOW (ref 4.7–6.1)
RBC # FLD: 15.3 % — HIGH (ref 11.5–14.5)
SODIUM SERPL-SCNC: 137 MMOL/L — SIGNIFICANT CHANGE UP (ref 135–146)
TSH SERPL-MCNC: 4.38 UIU/ML — HIGH (ref 0.27–4.2)
VIT B12 SERPL-MCNC: 465 PG/ML — SIGNIFICANT CHANGE UP (ref 232–1245)
WBC # BLD: 8.46 K/UL — SIGNIFICANT CHANGE UP (ref 4.8–10.8)
WBC # FLD AUTO: 8.46 K/UL — SIGNIFICANT CHANGE UP (ref 4.8–10.8)

## 2024-08-12 RX ADMIN — ENOXAPARIN SODIUM 30 MILLIGRAM(S): 100 INJECTION SUBCUTANEOUS at 17:51

## 2024-08-12 RX ADMIN — Medication 1 APPLICATION(S): at 17:51

## 2024-08-12 RX ADMIN — AMPICILLIN SODIUM AND SULBACTAM SODIUM 200 GRAM(S): 1; .5 INJECTION, POWDER, FOR SOLUTION INTRAMUSCULAR; INTRAVENOUS at 00:12

## 2024-08-12 RX ADMIN — Medication 100 MILLIGRAM(S): at 12:18

## 2024-08-12 RX ADMIN — Medication 10 MILLIGRAM(S): at 23:53

## 2024-08-12 RX ADMIN — CHLORHEXIDINE GLUCONATE 15 MILLILITER(S): 40 SOLUTION TOPICAL at 05:58

## 2024-08-12 RX ADMIN — AMPICILLIN SODIUM AND SULBACTAM SODIUM 200 GRAM(S): 1; .5 INJECTION, POWDER, FOR SOLUTION INTRAMUSCULAR; INTRAVENOUS at 12:18

## 2024-08-12 RX ADMIN — Medication 50 MILLIGRAM(S): at 22:57

## 2024-08-12 RX ADMIN — LOSARTAN POTASSIUM 25 MILLIGRAM(S): 50 TABLET ORAL at 05:58

## 2024-08-12 RX ADMIN — Medication 81 MILLIGRAM(S): at 12:18

## 2024-08-12 RX ADMIN — CHLORHEXIDINE GLUCONATE 15 MILLILITER(S): 40 SOLUTION TOPICAL at 17:51

## 2024-08-12 RX ADMIN — Medication 75 MILLIGRAM(S): at 17:50

## 2024-08-12 RX ADMIN — METOPROLOL TARTRATE 12.5 MILLIGRAM(S): 100 TABLET ORAL at 17:51

## 2024-08-12 RX ADMIN — CHLORHEXIDINE GLUCONATE 1 APPLICATION(S): 40 SOLUTION TOPICAL at 05:59

## 2024-08-12 RX ADMIN — Medication 1 MILLIGRAM(S): at 12:17

## 2024-08-12 RX ADMIN — CHLORHEXIDINE GLUCONATE 1 APPLICATION(S): 40 SOLUTION TOPICAL at 06:00

## 2024-08-12 RX ADMIN — TAMSULOSIN HYDROCHLORIDE 0.4 MILLIGRAM(S): 0.4 CAPSULE ORAL at 22:57

## 2024-08-12 RX ADMIN — METOPROLOL TARTRATE 12.5 MILLIGRAM(S): 100 TABLET ORAL at 05:58

## 2024-08-12 RX ADMIN — AMLODIPINE BESYLATE 5 MILLIGRAM(S): 10 TABLET ORAL at 05:58

## 2024-08-12 RX ADMIN — Medication 1 TABLET(S): at 12:18

## 2024-08-12 RX ADMIN — AMPICILLIN SODIUM AND SULBACTAM SODIUM 200 GRAM(S): 1; .5 INJECTION, POWDER, FOR SOLUTION INTRAMUSCULAR; INTRAVENOUS at 05:58

## 2024-08-12 RX ADMIN — ENOXAPARIN SODIUM 30 MILLIGRAM(S): 100 INJECTION SUBCUTANEOUS at 05:59

## 2024-08-12 RX ADMIN — POLYETHYLENE GLYCOL 3350 17 GRAM(S): 17 POWDER, FOR SOLUTION ORAL at 12:18

## 2024-08-12 RX ADMIN — Medication 2 TABLET(S): at 22:57

## 2024-08-12 NOTE — PROGRESS NOTE ADULT - ASSESSMENT
HD: 18, POD: 11  79 male BIBEMS with left leftort III, Right Lefort II, Bilateral lefort I, Bilateral HALEY fractures.  Patient is now s/p Tracheostomy and Open reduction internal fixation of Bilateral panfacial fractures via bicoronal, transconjunctival, and transoral approaches.    Overall patient is doing well. All Scalp staples were removed at bedside this morning.       Recs:   - Patient is OK to shower from OMFS perspective (can gently clean hair with warm water and shampoo)  - OK to stop Unasyn from OMFS perspective   - Must keep intra-oral elastic rubber bands in place, to guide into proper occlusion (will teach family how to replace rubber bands if they fall out)  - Peridex mouthwash BID   - Liquid/ Puree diet for a total of 6 weeks (until 9/12/24)  - Sinus precautions (open mouth sneezing, no nose blowing)    - please Reconsult OMFS as needed.   - Rest of care per primary team     - If discharged, Patient will follow up with Dr Wharton 1 week after discharge  Location: [facility]  [address]   [place], [place]

## 2024-08-12 NOTE — PROGRESS NOTE ADULT - SUBJECTIVE AND OBJECTIVE BOX
Patient is a 79y old  Male who presents with a chief complaint of Rehab for traumatic head trauma sustaining multiple Facial Bone Fracture, Le [place] Type III, epistaxis S/P Bilateral Internal Maxillary Arteries embolization and ORIF of Facial Bone Fracture, Le [place] Type III 2/2 mechanical fall (09 Aug 2024 14:51)      HPI:  August 11, 2024 admission    79 y.o. M Indonesian speaking  with PMH of CAD w/ prior MI s/p CABG (2000), HTN, HLD, MVR, TEO noncompliant with CPAP, recent diagnosis of prostate CA presented on 7/26/2024 to SICU after sustaining injuries from a mechanical fall (+HT, -LOC, -AC). Patient was walking out of his house to see his Cardiologist (Dr. Basurto) when he tripped down his front 2 steps and landed forward on his face onto the concrete.  CT panscan revealed extensive b/l fascial Type III [place] fractures (bilateral facial fx traversing bilateral orbits, nasal bones, nasal septum, maxillary sinus, pterygoid plates and left zygomatic arch) and soft tissue swelling. OMFS consulted. Patient underwent (8/1) ORIF of multiple fascial fractures. ENT consulted for ongoing nasal bleeding s/p rhino rocket. Continues to bleed. Underwent bilateral internal maxillary arteries embolization by neuroendovascular. 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       During patient's hospital course, he developed delirium and violent behavior, code grey called and received Zyprexa and Precedex. Also transfuses 1u PRBC 2/2 acute blood loss anemia. Additionally, patient required tracheostomy (8/1) for airway protection, decannulation 8/8/2024. Labs significant for leukocytosis that continues to downtrend, no active infection identified.  On 8/9/2024, just prior to discharge to acute rehabilitation patient spike a fever of 100.8; therefore admission was postponed. Repeat vitals have been stable, labs reviewed. Patient has not spike a fever in the past 48 hours and is medically stable to admit to acute rehabilitation on 8/11/2024    During their stay, the patient was evaluated by physical and occupational therapy. Prior to admission, patient was independent with ambulation and ADLs. The most recent evaluated functional status is Kevin for bed mobility, modA for transfers, modA for ambulation x30 feet with RW, set up for upper body dressing, and contact guard for lower body dressing. Patient was deemed a good acute rehab candidate due to decline in functional status and ability to carry out activities of daily living. Patient is able to tolerate 3 hours of therapy 5-6 days a week and is motivated to participate.     LS: Lives with his wife in a private house with 5 [address] and 14 flight of stairs to the 2nd floor bedroom.    PLOF: Independent with ADLs and IADLs. Ambulated independently without any AD.        (09 Aug 2024 14:51)      I examined the patient and reviewed the chart. There have been no significant changes since my history and physical except where documented below.    TODAY'S SUBJECTIVE & REVIEW OF SYMPTOMS      Constiutional:    [ x  ] WNL           [   ] poor appetite   [   ] insomnia   [   ] tired   Cardio:                [x   ] WNL           [   ] CP   [   ] MCDANIEL   [   ] palpitations               Resp:                   [ x  ] WNL           [   ] SOB   [   ] cough   [   ] wheezing   GI:                        [ x  ] WNL           [   ] constipation   [   ] diarrhea   [   ] abdominal pain   [   ] nausea   [   ] emesis                                :                      [  x ] WNL           [   ] SOLORZANO  [   ] dysuria   [   ] difficulty voiding             Endo:                   [  x ] WNL          [   ] polyuria   [   ] temperature intolerance                 Skin:                     [   ] WNL          [   ] pain   [ x  ] wound   [   ] rash   MSK:                    [x   ] WNL          [   ] muscle pain   [   ] joint pain/ stiffness   [   ] muscle tenderness   [   ] swelling   Neuro:                 [   ] WNL          [   ] HA   [   x] change in vision   [x   ] tremor: improving   [ x  ] weakness   [   ]dysphagia              Cognitive:           [ x  ] WNL           [   ]confusion      Psych:                  [ x  ] WNL           [   ] hallucinations   [   ]agitation   [   ] delusion   [   ]depression    PHYSICAL EXAM    Vital Signs Last 24 Hrs  T(C): 36.7 (12 Aug 2024 05:46), Max: 37.2 (11 Aug 2024 19:24)  T(F): 98.1 (12 Aug 2024 05:46), Max: 98.9 (11 Aug 2024 19:24)  HR: 81 (12 Aug 2024 05:46) (77 - 89)  BP: 153/71 (12 Aug 2024 05:46) (125/60 - 154/62)  BP(mean): 80 (11 Aug 2024 19:24) (80 - 82)  RR: 18 (12 Aug 2024 05:46) (17 - 18)  SpO2: 96% (11 Aug 2024 19:24) (94% - 97%)    Parameters below as of 12 Aug 2024 05:46  Patient On (Oxygen Delivery Method): room air    General:[ x  ] NAD, Resting Comfortable,   [   ] other:                                HEENT: [  x ] NC/AT, EOMI, PERRL , [] Normal Conjunctivae,   [   ] other: decreased L with L subconjuctival hemorrhage   Cardio: [  x ] RRR, [x] grade II/VI systolic ejection murmur,   [   ] other:                           Pulm: [ x  ] No Respiratory Distress,  Lungs CTAB,   [   ] other:                       Abdomen: [ x  ]ND/NT, Soft,   [   ] other:    : [ x  ] NO SOLORZANO CATHETER, [   ] SOLORZANO CATHETER- no meatal tear, no discharge, [   ] other:                          MSK: [ x  ] No joint swelling, Full ROM, appropriate passive and active ROM,   [   ] other:  diffuse resting tremors  Ext: [  x ]No C/C/E, No calf tenderness,   [   ]other:    Skin: [   ]intact,   [  x ] other:  fascial bruising, b/l orbital ecchymosis, L knee contusion                                                                Neurological Examination:  Cognitive: [    ] AAO x 2 not orientated to time,   [    ]  other:                                                                      Attention:  [ x   ] intact,   [    ]  other:  able to do serial 7s                          Memory: [  x  ] intact,    [    ]  other:     Mood/Affect: [ x   ] wnl,    [    ]  other:                                                                             Communication: [  x  ]Fluent, no dysarthria, following commands:  [    ] other:   CN II - XII:  [ x   ] intact except for decreased L vision,  [    ] other:                                                                                        Motor:   RIGHT UE: [   ] WNL,  [   ] other: Shoulder abduction 5/5, Elbow flexion 5/5, Elbow extension 5/5, Wrist Extension 5/5, Finger abduction 5/5,  4+/5   LEFT    UE: [   ] WNL,  [   ] other: Shoulder abduction 5/5, Elbow flexion 5/5, Elbow extension 5/5, Wrist Extension 5/5, Finger abduction 5/5,  4+/5     RIGHT LE: [   ] WNL,  [   ] other: Hip flexion 4-/5, knee extension 4-/5, plantarflexion 5/5, dorsiflexion 5/5  LEFT    LE: [   ] WNL,  [   ] other: Hip flexion 5/5, 5/5  knee extension/plantarflexion/dorsiflexion     Tone: [   x ] wnl,   [    ]  other:  DTRs: [x   ]symmetric, [   ] other:  Coordination:   [  x  ] intact,   [    ] other:                                                                           Sensory: [  x  ] Intact to light touch,   [    ] other:    aluminum hydroxide/magnesium hydroxide/simethicone Suspension 30 milliLiter(s) Oral every 6 hours PRN  amLODIPine   Tablet 5 milliGRAM(s) Oral daily  ampicillin/sulbactam  IVPB 3 Gram(s) IV Intermittent every 6 hours  aspirin  chewable 81 milliGRAM(s) Oral daily  bacitracin   Ointment 1 Application(s) Topical every 24 hours  chlorhexidine 0.12% Liquid 15 milliLiter(s) Swish and Spit two times a day  chlorhexidine 2% Cloths 1 Application(s) Topical <User Schedule>  chlorhexidine 2% Cloths 1 Application(s) Topical <User Schedule>  clopidogrel Tablet 75 milliGRAM(s) Oral daily  enoxaparin Injectable 30 milliGRAM(s) SubCutaneous every 12 hours  folic acid 1 milliGRAM(s) Oral daily  hydrochlorothiazide 25 milliGRAM(s) Oral daily  losartan 25 milliGRAM(s) Oral daily  magnesium hydroxide Suspension 30 milliLiter(s) Oral daily PRN  melatonin 5 milliGRAM(s) Oral <User Schedule>  metoprolol tartrate 12.5 milliGRAM(s) Oral every 12 hours  multivitamin 1 Tablet(s) Oral daily  oxyCODONE    Solution 5 milliGRAM(s) Oral every 4 hours PRN  polyethylene glycol 3350 17 Gram(s) Oral daily  senna 2 Tablet(s) Oral at bedtime  simvastatin 10 milliGRAM(s) Oral at bedtime  tamsulosin 0.4 milliGRAM(s) Oral at bedtime  thiamine 100 milliGRAM(s) Oral daily  traZODone 50 milliGRAM(s) Oral every 24 hours      RECENT LABS/IMAGING                        9.1    8.46  )-----------( 376      ( 12 Aug 2024 07:01 )             28.2     08-12    137  |  101  |  17  ----------------------------<  103<H>  4.5   |  25  |  0.8    Ca    8.4      12 Aug 2024 07:01  Mg     2.2     08-12    TPro  6.1  /  Alb  3.4<L>  /  TBili  0.5  /  DBili  x   /  AST  21  /  ALT  36  /  AlkPhos  85  08-12      Urinalysis Basic - ( 12 Aug 2024 07:01 )    Color: x / Appearance: x / SG: x / pH: x  Gluc: 103 mg/dL / Ketone: x  / Bili: x / Urobili: x   Blood: x / Protein: x / Nitrite: x   Leuk Esterase: x / RBC: x / WBC x   Sq Epi: x / Non Sq Epi: x / Bacteria: x

## 2024-08-12 NOTE — PROGRESS NOTE ADULT - SUBJECTIVE AND OBJECTIVE BOX
HD: 18, POD: 11    79 male BIBEMS s/p fall. Pt reports to have been leaving his house for an appointment when he tripped on then stairs in front of his home. He sustained left leftort III, Right Lefort II, Bilateral lefort I, Bilateral HALEY fractures. He is now s/p ORIF of facial fractures.     24hr events: No acute events.     Vitals: ICU Vital Signs Last 24 Hrs  T(C): 36.7 (12 Aug 2024 05:46), Max: 37.2 (11 Aug 2024 19:24)  T(F): 98.1 (12 Aug 2024 05:46), Max: 98.9 (11 Aug 2024 19:24)  HR: 81 (12 Aug 2024 05:46) (81 - 89)  BP: 153/71 (12 Aug 2024 05:46) (125/60 - 154/62)  BP(mean): 80 (11 Aug 2024 19:24) (80 - 82)  RR: 18 (12 Aug 2024 05:46) (17 - 18)  SpO2: 96% (11 Aug 2024 19:24) (94% - 97%)    O2 Parameters below as of 12 Aug 2024 05:46  Patient On (Oxygen Delivery Method): room air    EXAM:   General: sitting upright in chair at bedside  Resp: breathing on room air  Head: Bicoronal incision closed with staples, Dried blood in hair  Ears: no escobedo signs, no drainage   Eyes: EOMI, Left eye with poor vision.   Nose: bilateral nares are patent  Face: bilateral facial ecchymosis, V1, V2, V3 appear intact  Mouth: ADDIS of 35mm, Maxillary vestibular incision closed with running vicryl suture is hemostatic. Occlusion guided by 4 IMF screws in place with elastics on right mandible to maxilla  Neck: trach site open to air.     Labs:                         9.1    8.46  )-----------( 376      ( 12 Aug 2024 07:01 )             28.2

## 2024-08-12 NOTE — PROGRESS NOTE ADULT - ASSESSMENT
NEUROPSYCHOLOGY INITIAL EVALUATION       Session focused on: Initial evaluation of cognitive functioning and family contact     Pain: Denied Intervention: N/A      Needed: No  : N/A     Appearance: Notable for bruising and abrasions on face and tracheostomy; left arm bandaged      Orientation: Orientedx3-4 (didn’t know month and day of week)      Arousal Level: Alert     Behavior: Pleasant and cooperative     Mood/Affect: WFL; calm      Motor: New onset full body tremor     Vision: Blurry; s/p cataracts removal, L eye globe rupture; light sensitive     Attention: WNL     Speech: Hypophonic 2/2 trach; oral production lacked relevant detail     Thought Process: WNL     Insight into illness/deficits: Fair          HPI: Neuropsychology was consulted to evaluate this 79-year-old male who is s/p mechanical fall (7/26) with HT (-LOC/AC). Patient has a PMHx of CAD s/p CABG (2000), HTN, HLD, recent diagnosis of prostate CA, and cataracts s/p surgery. External signs of trauma are bilateral periorbital hematoma, possible interruption of left globe of eye, intranasal hematoma with active epistaxis, left shoulder bruise. CTH was negative for acute pathology. HC notable for hypertension, delirium (paranoid delusions, visual hallucinations; currently resolving), intubation, and tracheostomy placement.          SICU Course Events:      7/27: 2 Mikayla Michael called overnight for agitation, Zyprexa 5mg x 3 given.     7/28: Pt somnolent for most of day, asymptomatic bradycardia to HR 40s. AxOx3, GCS 15.      7/30: A&O x3 in morning but delirium after 4pm, given Zyprexa Mikayla Andres called due to increasing agitation attempting to leave room, spitting at staff members; placed in restraints and posey and given 5mg Haldol     7/31: Hypertensive due to not taking PO meds; 10mg labetalol x 1; metoprolol converted to IV 5mg q 6 hours. Zyprexa 2.5mg x 1. Now s/p IR for diagnostic cerebral angiogram, b/l internal maxillary artery embolization. Left intubated at end of case.      8/1: OR with OMFS for panfacial fracture repair; tracheostomy placement. Questionable seizure-like activity -> Ativan given, neurology consulted, CT head completed.      8/2: Placed on T-piece ventilator during the day, volume control ventilation overnight. Required Precedex overnight due to increased agitation despite Quetiapine, Trazadone, and Melatonin.     8/3: Started home meds for BP control. OMFS bedside jaw alignment with local anesthesia. Trazodone and melatonin given     8/4: Saturation on dressing, transferred to SDU. Home HCTZ and Labetalol push for HTN.     8/5: Following commands. Speaking valve in place, tolerating well. D/c  precedex; Dilaudid 0.5 q4 added for pain          Social History and Premorbid Functioning: Patient was born in [place] and earned his GED (did not complete HS). He lives with his wife of 57 years. Ivorian is his primary language, but his wife estimates his English proficiency at about 75%. Patient endorsed smoking and occasional social drinking. He denied illicit drug use or psychiatric history. Patient worked in [employer] business until age 62. 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 Patient and wife emphatically denied any cognitive problems prior to fall (“he was more than 100%”), as well as prior falls or changes in balance and gait. Per wife, he was always very good at math and now struggles with simple mental numerical operations. Patient was driving and managing medical care and finances independently. Per wife, after HT, patient was confused and paranoid for over a week (e.g., patient didn’t recognize her and thought his doctor was going to kill him).  In the past few days, confusion tends to occur at nighttime, and patient is calm and oriented during the day.           Measures Given:      Cognistat; Clock Draw           Behavioral Observations: As above. Of note, patient was found to be febrile shortly after evaluation.          Results:     Attention/Working Memory: Poor attention (DF = 3), working memory (DB < 3), and mildly weak concentration (2/4 on mental math)     Comprehension impaired; follows only single step commands.     Repetition impaired     Writing: Dysgraphic, impacted by tremor.     Naming: Trace evidence of difficulty     Retrieval: Impaired with some evidence of retention w/MC cues (1/4 FR, 0 CR, 2/4 MC)     EF: Poor abstract reasoning. Judgement was WNL. Mental numerical operations impaired relative to reported baseline (2/3). Verbal learning was very weak (7 trials to criterion).          Clinical Summary:      Overall, results of the evaluation revealed prominent frontal-subcortical dysfunction. Basic auditory attention and working memory were impaired and processing speed was slow, all of which impacted encoding and retrieval of verbal information. Numerical calculation, abstract reasoning, and conceptualization were also impaired. In addition, there was also evidence of difficulty with both receptive and expressive language, however patient is ESL (primary language Ivorian), and comprehension/verbal output were improved in Ivorian when wife conversed with him. Currently, the patient’s neurocognitive profile evidenced major executive dysfunction indicative of acute change from baseline. Symptoms are suggestive of disruption of frontal-subcortical circuitry, which is common in TBI. 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 Regarding etiology, early in the hospital course, the patient became simultaneously delirious, hypertensive, and displayed seizure-like activity. Patient may have experienced hypertensive crisis resulting in his fall, and extreme spikes in hypertension  can also result in transientencephalopathy with confusion and disorientation. Further, considering the extent of head trauma, cognitive symptoms are likely the result of a brain injury not visualized on CT, as HT can cause a cascade of metabolic changes in the brain in the absence of structural pathology. Finally, while less likely given collateral informant denied premorbid cognitive changes, premorbid cognitive deficits that predispose patients to hospital-related delirium cannot entirely be ruled out entirely. Cognition should be serially monitored. It is advisable that further testing be conducted in the patient’s primary language (Ivorian).           Recommendations: Use  when needed to ensure comprehension; Provide simple and clear instructions; Frequent re-orientation using verbal/visual cues; Place patient on OSE for overstimulation – light sensitivity; MRI can be useful to rule out brain injury not visualized on CT.          Prognosis: Cognitive prognosis is guarded, warrants continue monitoring as patient was independent at baseline, with no premorbid physical/cognitive changes, and delirium is currently resolving, he will likely make significant rehabilitative gains           Plan: Retain on NP service; monitor mental status and cognition; follow-up evaluation as needed; provide feedback and psychoeducation prior to discharge.

## 2024-08-12 NOTE — PROGRESS NOTE ADULT - ASSESSMENT
ASSESSMENT/PLAN  79 y.o. M with PMH of CAD w/ prior MI s/p CABG (2000), HTN, HLD, MVR, TEO noncompliant with CPAP, recent diagnosis of prostate CA presented on 7/26/2024 to SICU after sustaining injuries from a mechanical fall (+HT, -LOC, -AC).  CT panscan revealed extensive b/l facial Type III [place] fractures (bilateral facial fx traversing bilateral orbits, nasal bones, nasal septum, maxillary sinus, pterygoid plates and left zygomatic arch) and soft tissue swelling. OMFS consulted. Patient underwent (8/1) ORIF of multiple facial fractures, bilateral internal maxillary arteries embolization by neuroendovascular.    Rehab for traumatic head trauma sustaining multiple Facial Bone Fractures, [place] Type III, epistaxis S/P Bilateral Internal Maxillary Arteries embolization and ORIF of Facial Bone Fracture, [place] Type III 2/2 mechanical fall      #Traumatic mechanical fall   #Head Trauma   #Multiple Facial Bone Fracture, [place] Type III.   #Epistaxis (resolved)   #S/P Bilateral Internal Maxillary Arteries embolization    #S/P ORIF of Facial Bone Fracture, [place] Type III   #S/P tracheostomy   #Gait abnormality  CT panscan revealed extensive b/l fascial Type III [place] fractures (bilateral facial fx traversing bilateral orbits, nasal bones, nasal septum, maxillary sinus, pterygoid plates and left zygomatic arch) and soft tissue swelling.    OMFS consulted. Patient underwent (8/1) ORIF of multiple fascial fractures.    ENT consulted for ongoing nasal bleeding s/p rhino rocket. Continues to bleed. Underwent bilateral internal maxillary arteries embolization by neuroendovascular. f/u Dr. Marcum outpatient    Ophthalmology consulted for  b/l orbital fractures, no intervention Tracheostomy for airway protection, decannulation 8/8/2024  - c/w Unasyn 3g q6h   - Pain control: Tylenol, oxycodone 5 mg q4hr PRN (avoid NSAID)    - Precaution: fall, sinus precautions (open mouth sneezing, no nose blowing), Head of bed elevated @ 30 degrees   - encourage incentive spirometry   - Monitor for acute bleeding      #L optic neuropathy    - Ophthalmology consulted, believed to be chronic, no acute intervention. Recommend outpatient f/u for MRI brain/orbits, heavy metals, B12, folate, syphilis, SARAI, ACE, antiphospholipid antibx     #leukocytosis (resolved)  - WBC downtrending, low grade temp on 8/9might 2/2 recent steroid use and Percedex  - WBC wnl, afebrile for 48 hours  - on Unasyn  - monitor vitals     #L ICA occlusion, R ICA stenosis    - 7/26 CTA neck showed Occlusion of L ICA at the L carotid bulb; Occlusion of the distal cervical portion of the left ICA; Severe stenosis at the right carotid bulb.  No evidence of vascular injury, dissection, or extravasation.   - Echo (10/2023): EF 63%, mildly-enlarged LA, no stress-induced wall motion abnormalities, moderate MR, TR, and AR, sclerotic aortic valve, mild SC, borderline pulmonary HTN, mild aortic root calcification   - Vascular consulted. Recommend outpatient F/U w/ Dr. Quigley for surgical planning for possible Right carotid endarterectomy     #CAD w/ prior MI s/p CABG (2000)   #MVR  #HTN   #HLD   - c/w ASA + Plavix    - Home med: Metoprolol 50 mg qd, losartan 100 mg QD, amlodipine 5 mg QD, HCTZ 25 mg QD, Ezetimibe-Simvastatin 10-10   - While inpt: continue metoprolol 12.5 mg bid, losartan 25 mg daily, amlodipine 5 mg QD, HCTZ 25 mg QD   - Continue simvastatin 10 mg   - Monitor and adjust as needed      #acute blood loss anemia    - Hb (8/8/2024) 8.4   - received 1u PRBC  - transfuse if Hb < 7  - type and screen      #urinary retention    - c/w Flomax 0.4 mg qhs  - monitor       #TEO    - Noncompliant with home CPAP   - Hold CPAP while facial injuries heal    -Pain control: Tylenol 650 mg q6h, Oxycodone 5 mg q4hr PRN     -GI/Bowel Mgmt: Miralax/senna    -Bladder management: voiding spontaneously      -Skin: fascial bruising, ecchymosis b/l orbit, L knee contusion    - Diet: Pureed diet  Precautions / PROPHYLAXIS:  Falls, sinus precautions (open mouth sneezing, no nose blowing), Head of bed elevated @ 30 degrees   - Ortho: Weight bearing status: no weight bearing restrictions     - DVT prophylaxis: Lovenox 30 mg q12h

## 2024-08-13 RX ADMIN — Medication 75 MILLIGRAM(S): at 12:03

## 2024-08-13 RX ADMIN — METOPROLOL TARTRATE 12.5 MILLIGRAM(S): 100 TABLET ORAL at 06:58

## 2024-08-13 RX ADMIN — POLYETHYLENE GLYCOL 3350 17 GRAM(S): 17 POWDER, FOR SOLUTION ORAL at 12:04

## 2024-08-13 RX ADMIN — TAMSULOSIN HYDROCHLORIDE 0.4 MILLIGRAM(S): 0.4 CAPSULE ORAL at 21:21

## 2024-08-13 RX ADMIN — Medication 81 MILLIGRAM(S): at 12:03

## 2024-08-13 RX ADMIN — ENOXAPARIN SODIUM 30 MILLIGRAM(S): 100 INJECTION SUBCUTANEOUS at 17:48

## 2024-08-13 RX ADMIN — ENOXAPARIN SODIUM 30 MILLIGRAM(S): 100 INJECTION SUBCUTANEOUS at 06:57

## 2024-08-13 RX ADMIN — Medication 10 MILLIGRAM(S): at 21:22

## 2024-08-13 RX ADMIN — Medication 50 MILLIGRAM(S): at 21:22

## 2024-08-13 RX ADMIN — AMLODIPINE BESYLATE 5 MILLIGRAM(S): 10 TABLET ORAL at 06:58

## 2024-08-13 RX ADMIN — Medication 1 MILLIGRAM(S): at 12:03

## 2024-08-13 RX ADMIN — CHLORHEXIDINE GLUCONATE 15 MILLILITER(S): 40 SOLUTION TOPICAL at 17:48

## 2024-08-13 RX ADMIN — Medication 1 TABLET(S): at 12:03

## 2024-08-13 RX ADMIN — METOPROLOL TARTRATE 12.5 MILLIGRAM(S): 100 TABLET ORAL at 17:49

## 2024-08-13 RX ADMIN — Medication 1 APPLICATION(S): at 17:48

## 2024-08-13 RX ADMIN — Medication 100 MILLIGRAM(S): at 12:03

## 2024-08-13 RX ADMIN — CHLORHEXIDINE GLUCONATE 1 APPLICATION(S): 40 SOLUTION TOPICAL at 07:04

## 2024-08-13 RX ADMIN — LOSARTAN POTASSIUM 25 MILLIGRAM(S): 50 TABLET ORAL at 06:58

## 2024-08-13 NOTE — PROGRESS NOTE ADULT - SUBJECTIVE AND OBJECTIVE BOX
Patient is a 79y old  Male who presents with a chief complaint of Rehab for traumatic head trauma sustaining multiple Facial Bone Fracture, [place] Type III, epistaxis S/P Bilateral Internal Maxillary Arteries embolization and ORIF of Facial Bone Fracture, [place] Type III 2/2 mechanical fall (09 Aug 2024 14:51)      HPI:  August 11, 2024 admission    79 y.o. M Welsh speaking  with PMH of CAD w/ prior MI s/p CABG (2000), HTN, HLD, MVR, TEO noncompliant with CPAP, recent diagnosis of prostate CA presented on 7/26/2024 to SICU after sustaining injuries from a mechanical fall (+HT, -LOC, -AC). Patient was walking out of his house to see his Cardiologist (Dr. Basurto) when he tripped down his front 2 steps and landed forward on his face onto the concrete.  CT panscan revealed extensive b/l fascial Type III [place] fractures (bilateral facial fx traversing bilateral orbits, nasal bones, nasal septum, maxillary sinus, pterygoid plates and left zygomatic arch) and soft tissue swelling. OMFS consulted. Patient underwent (8/1) ORIF of multiple fascial fractures. ENT consulted for ongoing nasal bleeding s/p rhino rocket. Continues to bleed. Underwent bilateral internal maxillary arteries embolization by neuroendovascular. 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       During patient's hospital course, he developed delirium and violent behavior, code grey called and received Zyprexa and Precedex. Also transfuses 1u PRBC 2/2 acute blood loss anemia. Additionally, patient required tracheostomy (8/1) for airway protection, decannulation 8/8/2024. Labs significant for leukocytosis that continues to downtrend, no active infection identified.  On 8/9/2024, just prior to discharge to acute rehabilitation patient spike a fever of 100.8; therefore admission was postponed. Repeat vitals have been stable, labs reviewed. Patient has not spike a fever in the past 48 hours and is medically stable to admit to acute rehabilitation on 8/11/2024    During their stay, the patient was evaluated by physical and occupational therapy. Prior to admission, patient was independent with ambulation and ADLs. The most recent evaluated functional status is Kevin for bed mobility, modA for transfers, modA for ambulation x30 feet with RW, set up for upper body dressing, and contact guard for lower body dressing. Patient was deemed a good acute rehab candidate due to decline in functional status and ability to carry out activities of daily living. Patient is able to tolerate 3 hours of therapy 5-6 days a week and is motivated to participate.     LS: Lives with his wife in a private house with 5 GERALDINE and 14 flight of stairs to the 2nd floor bedroom.    PLOF: Independent with ADLs and IADLs. Ambulated independently without any AD.        (09 Aug 2024 14:51)      I examined the patient and reviewed the chart. There have been no significant changes since my history and physical except where documented below.    TODAY'S SUBJECTIVE & REVIEW OF SYMPTOMS  Patient was seen and assessed at bedside.   No overnight events.   Patient denies any new complaints at this time.   Tolerating PT/OT.   Tolerating oral diet.   Voiding and passing stool spontaneously.   Vital signs reviewed.    Constiutional:    [ x  ] WNL           [   ] poor appetite   [   ] insomnia   [   ] tired   Cardio:                [x   ] WNL           [   ] CP   [   ] MCDANIEL   [   ] palpitations               Resp:                   [ x  ] WNL           [   ] SOB   [   ] cough   [   ] wheezing   GI:                        [ x  ] WNL           [   ] constipation   [   ] diarrhea   [   ] abdominal pain   [   ] nausea   [   ] emesis                                :                      [  x ] WNL           [   ] SOLORZANO  [   ] dysuria   [   ] difficulty voiding             Endo:                   [  x ] WNL          [   ] polyuria   [   ] temperature intolerance                 Skin:                     [   ] WNL          [   ] pain   [ x  ] wound   [   ] rash   MSK:                    [x   ] WNL          [   ] muscle pain   [   ] joint pain/ stiffness   [   ] muscle tenderness   [   ] swelling   Neuro:                 [   ] WNL          [   ] HA   [   x] change in vision   [x   ] tremor: improving   [ x  ] weakness   [   ]dysphagia              Cognitive:           [ x  ] WNL           [   ]confusion      Psych:                  [ x  ] WNL           [   ] hallucinations   [   ]agitation   [   ] delusion   [   ]depression    PHYSICAL EXAM    T(C): 37 (08-13-24 @ 05:25), Max: 37.2 (08-12-24 @ 12:28)  T(F): 98.6 (08-13-24 @ 05:25), Max: 98.9 (08-12-24 @ 12:28)  HR: 77 (08-13-24 @ 05:25) (74 - 86)  BP: 139/77 (08-13-24 @ 05:25) (103/60 - 151/69)  ABP: --  ABP(mean): --  RR: 20 (08-13-24 @ 05:25) (17 - 20)  SpO2: 95% (08-12-24 @ 17:50) (95% - 95%)    Parameters below as of 12 Aug 2024 05:46  Patient On (Oxygen Delivery Method): room air    General:[ x  ] NAD, Resting Comfortable,   [   ] other:                                HEENT: [  x ] NC/AT, EOMI, PERRL , [] Normal Conjunctivae,   [   ] other: decreased L with L subconjuctival hemorrhage   Cardio: [  x ] RRR, [x] grade II/VI systolic ejection murmur,   [   ] other:                           Pulm: [ x  ] No Respiratory Distress,  Lungs CTAB,   [   ] other:                       Abdomen: [ x  ]ND/NT, Soft,   [   ] other:    : [ x  ] NO SOLORZANO CATHETER, [   ] SOLORZANO CATHETER- no meatal tear, no discharge, [   ] other:                          MSK: [ x  ] No joint swelling, Full ROM, appropriate passive and active ROM,   [   ] other:  diffuse resting tremors  Ext: [  x ]No C/C/E, No calf tenderness,   [   ]other:    Skin: [   ]intact,   [  x ] other:  fascial bruising, b/l orbital ecchymosis, L knee contusion                                                                Neurological Examination:  Cognitive: [    ] AAO x 2 not orientated to time,   [    ]  other:                                                                      Attention:  [ x   ] intact,   [    ]  other:  able to do serial 7s                          Memory: [  x  ] intact,    [    ]  other:     Mood/Affect: [ x   ] wnl,    [    ]  other:                                                                             Communication: [  x  ]Fluent, no dysarthria, following commands:  [    ] other:   CN II - XII:  [ x   ] intact except for decreased L vision,  [    ] other:                                                                                        Motor:   RIGHT UE: [   ] WNL,  [   ] other: Shoulder abduction 5/5, Elbow flexion 5/5, Elbow extension 5/5, Wrist Extension 5/5, Finger abduction 5/5,  4+/5   LEFT    UE: [   ] WNL,  [   ] other: Shoulder abduction 5/5, Elbow flexion 5/5, Elbow extension 5/5, Wrist Extension 5/5, Finger abduction 5/5,  4+/5     RIGHT LE: [   ] WNL,  [   ] other: Hip flexion 4-/5, knee extension 4-/5, plantarflexion 5/5, dorsiflexion 5/5  LEFT    LE: [   ] WNL,  [   ] other: Hip flexion 5/5, 5/5  knee extension/plantarflexion/dorsiflexion     Tone: [   x ] wnl,   [    ]  other:  DTRs: [x   ]symmetric, [   ] other:  Coordination:   [  x  ] intact,   [    ] other:                                                                           Sensory: [  x  ] Intact to light touch,   [    ] other:    MEDICATIONS  (STANDING):  amLODIPine   Tablet 5 milliGRAM(s) Oral daily  aspirin  chewable 81 milliGRAM(s) Oral daily  bacitracin   Ointment 1 Application(s) Topical every 24 hours  chlorhexidine 0.12% Liquid 15 milliLiter(s) Swish and Spit two times a day  chlorhexidine 2% Cloths 1 Application(s) Topical <User Schedule>  chlorhexidine 2% Cloths 1 Application(s) Topical <User Schedule>  clopidogrel Tablet 75 milliGRAM(s) Oral daily  enoxaparin Injectable 30 milliGRAM(s) SubCutaneous every 12 hours  folic acid 1 milliGRAM(s) Oral daily  hydrochlorothiazide 25 milliGRAM(s) Oral daily  losartan 25 milliGRAM(s) Oral daily  melatonin 5 milliGRAM(s) Oral <User Schedule>  metoprolol tartrate 12.5 milliGRAM(s) Oral every 12 hours  multivitamin 1 Tablet(s) Oral daily  polyethylene glycol 3350 17 Gram(s) Oral daily  senna 2 Tablet(s) Oral at bedtime  simvastatin 10 milliGRAM(s) Oral at bedtime  tamsulosin 0.4 milliGRAM(s) Oral at bedtime  thiamine 100 milliGRAM(s) Oral daily  traZODone 50 milliGRAM(s) Oral every 24 hours    MEDICATIONS  (PRN):  aluminum hydroxide/magnesium hydroxide/simethicone Suspension 30 milliLiter(s) Oral every 6 hours PRN Dyspepsia  magnesium hydroxide Suspension 30 milliLiter(s) Oral daily PRN Constipation  oxyCODONE    Solution 5 milliGRAM(s) Oral every 4 hours PRN Severe Pain (7 - 10)      RECENT LABS/IMAGING                        9.1    8.46  )-----------( 376      ( 12 Aug 2024 07:01 )             28.2     08-12    137  |  101  |  17  ----------------------------<  103<H>  4.5   |  25  |  0.8    Ca    8.4      12 Aug 2024 07:01  Mg     2.2     08-12    TPro  6.1  /  Alb  3.4<L>  /  TBili  0.5  /  DBili  x   /  AST  21  /  ALT  36  /  AlkPhos  85  08-12      Urinalysis Basic - ( 12 Aug 2024 07:01 )    Color: x / Appearance: x / SG: x / pH: x  Gluc: 103 mg/dL / Ketone: x  / Bili: x / Urobili: x   Blood: x / Protein: x / Nitrite: x   Leuk Esterase: x / RBC: x / WBC x   Sq Epi: x / Non Sq Epi: x / Bacteria: x

## 2024-08-13 NOTE — PROGRESS NOTE ADULT - ASSESSMENT
ASSESSMENT/PLAN  79 y.o. M with PMH of CAD w/ prior MI s/p CABG (2000), HTN, HLD, MVR, TEO noncompliant with CPAP, recent diagnosis of prostate CA presented on 7/26/2024 to SICU after sustaining injuries from a mechanical fall (+HT, -LOC, -AC).  CT panscan revealed extensive b/l facial Type III [place] fractures (bilateral facial fx traversing bilateral orbits, nasal bones, nasal septum, maxillary sinus, pterygoid plates and left zygomatic arch) and soft tissue swelling. OMFS consulted. Patient underwent (8/1) ORIF of multiple facial fractures, bilateral internal maxillary arteries embolization by neuroendovascular.    Rehab for traumatic head trauma sustaining multiple Facial Bone Fractures, [place] Type III, epistaxis S/P Bilateral Internal Maxillary Arteries embolization and ORIF of Facial Bone Fracture, [place] Type III 2/2 mechanical fall      #Traumatic mechanical fall   #Head Trauma   #Multiple Facial Bone Fracture, [place] Type III.   #Epistaxis (resolved)   #S/P Bilateral Internal Maxillary Arteries embolization    #S/P ORIF of Facial Bone Fracture, [place] Type III   #S/P tracheostomy   #Gait abnormality  CT panscan revealed extensive b/l fascial Type III [place] fractures (bilateral facial fx traversing bilateral orbits, nasal bones, nasal septum, maxillary sinus, pterygoid plates and left zygomatic arch) and soft tissue swelling.    OMFS consulted. Patient underwent (8/1) ORIF of multiple fascial fractures.    ENT consulted for ongoing nasal bleeding s/p rhino rocket. Continues to bleed. Underwent bilateral internal maxillary arteries embolization by neuroendovascular. f/u Dr. Marcum outpatient    Ophthalmology consulted/ for  b/l orbital fractures, no intervention Tracheostomy for airway protection, decannulation 8/8/2024  - Unasyn 3g q6h stopped 8/12 as per OMFS  - Pain control: Tylenol, oxycodone 5 mg q4hr PRN (avoid NSAID)    - Precaution: fall, sinus precautions (open mouth sneezing, no nose blowing), Head of bed elevated @ 30 degrees   - encourage incentive spirometry   - staples removed by OMFS 8/12  - Monitor for acute bleeding      #L optic neuropathy    - Ophthalmology consulted, believed to be chronic, no acute intervention. Recommend outpatient f/u for MRI brain/orbits, heavy metals, B12, folate, syphilis, SARAI, ACE, antiphospholipid antibx     #leukocytosis (resolved)  - WBC downtrending, low grade temp on 8/9might 2/2 recent steroid use and Percedex  - WBC wnl, afebrile for 48 hours  - Unasyn stopped 8/12 as per OMFS  - monitor vitals     #L ICA occlusion, R ICA stenosis    - 7/26 CTA neck showed Occlusion of L ICA at the L carotid bulb; Occlusion of the distal cervical portion of the left ICA; Severe senosis at the right carotid bulb.  No evidence of vascular injury, dissection, or extravasation.   - Echo (10/2023): EF 63%, mildly-enlarged LA, no stress-induced wall motion abnormalities, moderate MR, TR, and AR, sclerotic as per OMFS aortic valve, mild FL, borderline pulmonary HTN, mild aortic root calcification   - Vascular consulted. Recommend outpatient F/U w/ Dr. Quigley for surgical planning for possible Right carotid endarterectomy     #CAD w/ prior MI s/p CABG (2000)   #MVR  #HTN   #HLD   - c/w ASA + Plavix    - Home med: Metoprolol 50 mg qd, losartan 100 mg QD, amlodipine 5 mg QD, HCTZ 25 mg QD, Ezetimibe-Simvastatin 10-10   - While inpt: continue metoprolol 12.5 mg bid, losartan 25 mg daily, amlodipine 5 mg QD, HCTZ 25 mg QD   - Continue simvastatin 10 mg   - Monitor and adjust as needed      #acute blood loss anemia    - Hb (8/8/2024) 8.4   - received 1u PRBC  - transfuse if Hb < 7  - type and screen      #urinary retention    - c/w Flomax 0.4 mg qhs  - monitor       #TEO    - Noncompliant with home CPAP   - Hold CPAP while facial injuries heal    -Pain control: Tylenol 650 mg q6h, Oxycodone 5 mg q4hr PRN     -GI/Bowel Mgmt: Miralax/senna    -Bladder management: voiding spontaneously      -Skin: fascial bruising, ecchymosis b/l orbit, L knee contusion    - Diet: Pureed diet  Precautions / PROPHYLAXIS:  Falls, sinus precautions (open mouth sneezing, no nose blowing), Head of bed elevated @ 30 degrees   - Ortho: Weight bearing status: no weight bearing restrictions     - DVT prophylaxis: Lovenox 30 mg q12h

## 2024-08-14 RX ADMIN — Medication 100 MILLIGRAM(S): at 12:42

## 2024-08-14 RX ADMIN — LOSARTAN POTASSIUM 25 MILLIGRAM(S): 50 TABLET ORAL at 05:44

## 2024-08-14 RX ADMIN — Medication 1 TABLET(S): at 12:41

## 2024-08-14 RX ADMIN — CHLORHEXIDINE GLUCONATE 15 MILLILITER(S): 40 SOLUTION TOPICAL at 05:45

## 2024-08-14 RX ADMIN — Medication 81 MILLIGRAM(S): at 12:42

## 2024-08-14 RX ADMIN — Medication 1 APPLICATION(S): at 17:07

## 2024-08-14 RX ADMIN — Medication 10 MILLIGRAM(S): at 21:29

## 2024-08-14 RX ADMIN — ENOXAPARIN SODIUM 30 MILLIGRAM(S): 100 INJECTION SUBCUTANEOUS at 05:44

## 2024-08-14 RX ADMIN — Medication 1 MILLIGRAM(S): at 12:42

## 2024-08-14 RX ADMIN — Medication 75 MILLIGRAM(S): at 12:42

## 2024-08-14 RX ADMIN — CHLORHEXIDINE GLUCONATE 1 APPLICATION(S): 40 SOLUTION TOPICAL at 05:43

## 2024-08-14 RX ADMIN — POLYETHYLENE GLYCOL 3350 17 GRAM(S): 17 POWDER, FOR SOLUTION ORAL at 12:42

## 2024-08-14 RX ADMIN — AMLODIPINE BESYLATE 5 MILLIGRAM(S): 10 TABLET ORAL at 05:44

## 2024-08-14 RX ADMIN — Medication 50 MILLIGRAM(S): at 21:29

## 2024-08-14 RX ADMIN — ENOXAPARIN SODIUM 30 MILLIGRAM(S): 100 INJECTION SUBCUTANEOUS at 17:07

## 2024-08-14 RX ADMIN — METOPROLOL TARTRATE 12.5 MILLIGRAM(S): 100 TABLET ORAL at 05:44

## 2024-08-14 RX ADMIN — METOPROLOL TARTRATE 12.5 MILLIGRAM(S): 100 TABLET ORAL at 17:07

## 2024-08-14 RX ADMIN — TAMSULOSIN HYDROCHLORIDE 0.4 MILLIGRAM(S): 0.4 CAPSULE ORAL at 21:29

## 2024-08-14 NOTE — PROGRESS NOTE ADULT - SUBJECTIVE AND OBJECTIVE BOX
Patient is a 79y old  Male who presents with a chief complaint of Rehab for traumatic head trauma sustaining multiple Facial Bone Fracture, [place] Type III, epistaxis S/P Bilateral Internal Maxillary Arteries embolization and ORIF of Facial Bone Fracture, [place] Type III 2/2 mechanical fall (09 Aug 2024 14:51)      HPI:  August 11, 2024 admission    79 y.o. M Italian speaking  with PMH of CAD w/ prior MI s/p CABG (2000), HTN, HLD, MVR, TEO noncompliant with CPAP, recent diagnosis of prostate CA presented on 7/26/2024 to SICU after sustaining injuries from a mechanical fall (+HT, -LOC, -AC). Patient was walking out of his house to see his Cardiologist (Dr. Basurto) when he tripped down his front 2 steps and landed forward on his face onto the concrete.  CT panscan revealed extensive b/l fascial Type III [place] fractures (bilateral facial fx traversing bilateral orbits, nasal bones, nasal septum, maxillary sinus, pterygoid plates and left zygomatic arch) and soft tissue swelling. OMFS consulted. Patient underwent (8/1) ORIF of multiple fascial fractures. ENT consulted for ongoing nasal bleeding s/p rhino rocket. Continues to bleed. Underwent bilateral internal maxillary arteries embolization by neuroendovascular. 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       During patient's hospital course, he developed delirium and violent behavior, code grey called and received Zyprexa and Precedex. Also transfuses 1u PRBC 2/2 acute blood loss anemia. Additionally, patient required tracheostomy (8/1) for airway protection, decannulation 8/8/2024. Labs significant for leukocytosis that continues to downtrend, no active infection identified.  On 8/9/2024, just prior to discharge to acute rehabilitation patient spike a fever of 100.8; therefore admission was postponed. Repeat vitals have been stable, labs reviewed. Patient has not spike a fever in the past 48 hours and is medically stable to admit to acute rehabilitation on 8/11/2024    During their stay, the patient was evaluated by physical and occupational therapy. Prior to admission, patient was independent with ambulation and ADLs. The most recent evaluated functional status is Kevin for bed mobility, modA for transfers, modA for ambulation x30 feet with RW, set up for upper body dressing, and contact guard for lower body dressing. Patient was deemed a good acute rehab candidate due to decline in functional status and ability to carry out activities of daily living. Patient is able to tolerate 3 hours of therapy 5-6 days a week and is motivated to participate.     LS: Lives with his wife in a private house with 5 GERALDINE and 14 flight of stairs to the 2nd floor bedroom.    PLOF: Independent with ADLs and IADLs. Ambulated independently without any AD.        (09 Aug 2024 14:51)      I examined the patient and reviewed the chart. There have been no significant changes since my history and physical except where documented below.    TODAY'S SUBJECTIVE & REVIEW OF SYMPTOMS  Patient was seen and assessed at bedside.   No overnight events.   Patient denies any new complaints at this time.   Feels very well today, appetite good. Had a shower and feels better.   Tolerating PT/OT.   Tolerating oral diet.   Voiding and passing stool spontaneously.   Vital signs reviewed.    Constiutional:    [ x  ] WNL           [   ] poor appetite   [   ] insomnia   [   ] tired   Cardio:                [x   ] WNL           [   ] CP   [   ] MCDANIEL   [   ] palpitations               Resp:                   [ x  ] WNL           [   ] SOB   [   ] cough   [   ] wheezing   GI:                        [ x  ] WNL           [   ] constipation   [   ] diarrhea   [   ] abdominal pain   [   ] nausea   [   ] emesis                                :                      [  x ] WNL           [   ] SOLORZANO  [   ] dysuria   [   ] difficulty voiding             Endo:                   [  x ] WNL          [   ] polyuria   [   ] temperature intolerance                 Skin:                     [   ] WNL          [   ] pain   [ x  ] wound   [   ] rash   MSK:                    [x   ] WNL          [   ] muscle pain   [   ] joint pain/ stiffness   [   ] muscle tenderness   [   ] swelling   Neuro:                 [   ] WNL          [   ] HA   [   x] change in vision   [x   ] tremor: improving   [ x  ] weakness   [   ]dysphagia              Cognitive:           [ x  ] WNL           [   ]confusion      Psych:                  [ x  ] WNL           [   ] hallucinations   [   ]agitation   [   ] delusion   [   ]depression    PHYSICAL EXAM  T(C): 36.8 (08-14-24 @ 05:31), Max: 37.1 (08-13-24 @ 21:59)  T(F): 98.3 (08-14-24 @ 05:31), Max: 98.8 (08-13-24 @ 21:59)  HR: 79 (08-14-24 @ 05:31) (78 - 92)  BP: 137/71 (08-14-24 @ 05:31) (115/72 - 158/80)  ABP: --  ABP(mean): --  RR: 18 (08-14-24 @ 05:31) (18 - 18)  SpO2: 98% (08-14-24 @ 05:31) (98% - 98%)    General:[ x  ] NAD, Resting Comfortable,   [   ] other:                                HEENT: [  x ] NC/AT, EOMI, PERRL , [] Normal Conjunctivae,   [   ] other: decreased L with L subconjuctival hemorrhage   Cardio: [  x ] RRR, [x] grade II/VI systolic ejection murmur,   [   ] other:                           Pulm: [ x  ] No Respiratory Distress,  Lungs CTAB,   [   ] other:                       Abdomen: [ x  ]ND/NT, Soft,   [   ] other:    : [ x  ] NO SOLORZANO CATHETER, [   ] SOLORZANO CATHETER- no meatal tear, no discharge, [   ] other:                          MSK: [ x  ] No joint swelling, Full ROM, appropriate passive and active ROM,   [   ] other:  diffuse resting tremors  Ext: [  x ]No C/C/E, No calf tenderness,   [   ]other:    Skin: [   ]intact,   [  x ] other:  fascial bruising, b/l orbital ecchymosis, L knee contusion                                                                Neurological Examination:  Cognitive: [    ] AAO x 2 not orientated to time,   [    ]  other:                                                                      Attention:  [ x   ] intact,   [    ]  other:  able to do serial 7s                          Memory: [  x  ] intact,    [    ]  other:     Mood/Affect: [ x   ] wnl,    [    ]  other:                                                                             Communication: [  x  ]Fluent, no dysarthria, following commands:  [    ] other:   CN II - XII:  [ x   ] intact except for decreased L vision,  [    ] other:                                                                                        Motor:   RIGHT UE: [   ] WNL,  [   ] other: Shoulder abduction 5/5, Elbow flexion 5/5, Elbow extension 5/5, Wrist Extension 5/5, Finger abduction 5/5,  4+/5   LEFT    UE: [   ] WNL,  [   ] other: Shoulder abduction 5/5, Elbow flexion 5/5, Elbow extension 5/5, Wrist Extension 5/5, Finger abduction 5/5,  4+/5     RIGHT LE: [   ] WNL,  [   ] other: Hip flexion 4-/5, knee extension 4-/5, plantarflexion 5/5, dorsiflexion 5/5  LEFT    LE: [   ] WNL,  [   ] other: Hip flexion 5/5, 5/5  knee extension/plantarflexion/dorsiflexion     Tone: [   x ] wnl,   [    ]  other:  DTRs: [x   ]symmetric, [   ] other:  Coordination:   [  x  ] intact,   [    ] other:                                                                           Sensory: [  x  ] Intact to light touch,   [    ] other:    MEDICATIONS  (STANDING):  amLODIPine   Tablet 5 milliGRAM(s) Oral daily  aspirin  chewable 81 milliGRAM(s) Oral daily  bacitracin   Ointment 1 Application(s) Topical every 24 hours  chlorhexidine 0.12% Liquid 15 milliLiter(s) Swish and Spit two times a day  chlorhexidine 2% Cloths 1 Application(s) Topical <User Schedule>  chlorhexidine 2% Cloths 1 Application(s) Topical <User Schedule>  clopidogrel Tablet 75 milliGRAM(s) Oral daily  enoxaparin Injectable 30 milliGRAM(s) SubCutaneous every 12 hours  folic acid 1 milliGRAM(s) Oral daily  hydrochlorothiazide 25 milliGRAM(s) Oral daily  losartan 25 milliGRAM(s) Oral daily  melatonin 5 milliGRAM(s) Oral <User Schedule>  metoprolol tartrate 12.5 milliGRAM(s) Oral every 12 hours  multivitamin 1 Tablet(s) Oral daily  polyethylene glycol 3350 17 Gram(s) Oral daily  senna 2 Tablet(s) Oral at bedtime  simvastatin 10 milliGRAM(s) Oral at bedtime  tamsulosin 0.4 milliGRAM(s) Oral at bedtime  thiamine 100 milliGRAM(s) Oral daily  traZODone 50 milliGRAM(s) Oral every 24 hours    MEDICATIONS  (PRN):  aluminum hydroxide/magnesium hydroxide/simethicone Suspension 30 milliLiter(s) Oral every 6 hours PRN Dyspepsia  magnesium hydroxide Suspension 30 milliLiter(s) Oral daily PRN Constipation  oxyCODONE    Solution 5 milliGRAM(s) Oral every 4 hours PRN Severe Pain (7 - 10)      RECENT LABS/IMAGING                        9.1    8.46  )-----------( 376      ( 12 Aug 2024 07:01 )             28.2     08-12    137  |  101  |  17  ----------------------------<  103<H>  4.5   |  25  |  0.8    Ca    8.4      12 Aug 2024 07:01  Mg     2.2     08-12    TPro  6.1  /  Alb  3.4<L>  /  TBili  0.5  /  DBili  x   /  AST  21  /  ALT  36  /  AlkPhos  85  08-12      Urinalysis Basic - ( 12 Aug 2024 07:01 )    Color: x / Appearance: x / SG: x / pH: x  Gluc: 103 mg/dL / Ketone: x  / Bili: x / Urobili: x   Blood: x / Protein: x / Nitrite: x   Leuk Esterase: x / RBC: x / WBC x   Sq Epi: x / Non Sq Epi: x / Bacteria: x

## 2024-08-14 NOTE — PROGRESS NOTE ADULT - ASSESSMENT
ASSESSMENT/PLAN  79 y.o. M with PMH of CAD w/ prior MI s/p CABG (2000), HTN, HLD, MVR, TEO noncompliant with CPAP, recent diagnosis of prostate CA presented on 7/26/2024 to SICU after sustaining injuries from a mechanical fall (+HT, -LOC, -AC).  CT panscan revealed extensive b/l facial Type III [place] fractures (bilateral facial fx traversing bilateral orbits, nasal bones, nasal septum, maxillary sinus, pterygoid plates and left zygomatic arch) and soft tissue swelling. OMFS consulted. Patient underwent (8/1) ORIF of multiple facial fractures, bilateral internal maxillary arteries embolization by neuroendovascular.    Rehab for traumatic head trauma sustaining multiple Facial Bone Fractures, [place] Type III, epistaxis S/P Bilateral Internal Maxillary Arteries embolization and ORIF of Facial Bone Fracture, [place] Type III 2/2 mechanical fall      #Traumatic mechanical fall   #Head Trauma   #Multiple Facial Bone Fracture, [place] Type III.   #Epistaxis (resolved)   #S/P Bilateral Internal Maxillary Arteries embolization    #S/P ORIF of Facial Bone Fracture, [place] Type III   #S/P tracheostomy   #Gait abnormality  CT panscan revealed extensive b/l fascial Type III [place] fractures (bilateral facial fx traversing bilateral orbits, nasal bones, nasal septum, maxillary sinus, pterygoid plates and left zygomatic arch) and soft tissue swelling.    OMFS consulted. Patient underwent (8/1) ORIF of multiple fascial fractures.    ENT consulted for ongoing nasal bleeding s/p rhino rocket. Continues to bleed. Underwent bilateral internal maxillary arteries embolization by neuroendovascular. f/u Dr. Marcum outpatient    Ophthalmology consulted/ for  b/l orbital fractures, no intervention Tracheostomy for airway protection, decannulation 8/8/2024  - Unasyn 3g q6h stopped 8/12 as per OMFS  - Pain control: Tylenol, oxycodone 5 mg q4hr PRN (avoid NSAID)    - Precaution: fall, sinus precautions (open mouth sneezing, no nose blowing), Head of bed elevated @ 30 degrees   - encourage incentive spirometry   - staples removed by OMFS 8/12; patient cleared to shower  - Monitor for acute bleeding      #L optic neuropathy    - Ophthalmology consulted, believed to be chronic, no acute intervention. Recommend outpatient f/u for MRI brain/orbits, heavy metals, B12, folate, syphilis, SARAI, ACE, antiphospholipid antibx     #leukocytosis (resolved)  - WBC downtrending, low grade temp on 8/9might 2/2 recent steroid use and Percedex  - WBC wnl, afebrile for 48 hours  - Unasyn stopped 8/12 as per OMFS  - monitor vitals     #L ICA occlusion, R ICA stenosis    - 7/26 CTA neck showed Occlusion of L ICA at the L carotid bulb; Occlusion of the distal cervical portion of the left ICA; Severe senosis at the right carotid bulb.  No evidence of vascular injury, dissection, or extravasation.   - Echo (10/2023): EF 63%, mildly-enlarged LA, no stress-induced wall motion abnormalities, moderate MR, TR, and AR, sclerotic as per OMFS aortic valve, mild AL, borderline pulmonary HTN, mild aortic root calcification   - Vascular consulted. Recommend outpatient F/U w/ Dr. Quigley for surgical planning for possible Right carotid endarterectomy     #CAD w/ prior MI s/p CABG (2000)   #MVR  #HTN   #HLD   - c/w ASA + Plavix    - Home med: Metoprolol 50 mg qd, losartan 100 mg QD, amlodipine 5 mg QD, HCTZ 25 mg QD, Ezetimibe-Simvastatin 10-10   - While inpt: continue metoprolol 12.5 mg bid, losartan 25 mg daily, amlodipine 5 mg QD, HCTZ 25 mg QD   - Continue simvastatin 10 mg   - Monitor and adjust as needed      #acute blood loss anemia    - Hb (8/8/2024) 8.4   - received 1u PRBC  - transfuse if Hb < 7  - type and screen      #urinary retention (resolved)  - c/w Flomax 0.4 mg qhs  - monitor       #TEO    - Noncompliant with home CPAP   - Hold CPAP while facial injuries heal    -Pain control: Tylenol 650 mg q6h, Oxycodone 5 mg q4hr PRN     -GI/Bowel Mgmt: Miralax/senna    -Bladder management: voiding spontaneously      -Skin: fascial bruising, ecchymosis b/l orbit, L knee contusion    - Diet: Pureed diet  Precautions / PROPHYLAXIS:  Falls, sinus precautions (open mouth sneezing, no nose blowing), Head of bed elevated @ 30 degrees   - Ortho: Weight bearing status: no weight bearing restrictions     - DVT prophylaxis: Lovenox 30 mg q12h

## 2024-08-15 RX ADMIN — Medication 81 MILLIGRAM(S): at 12:15

## 2024-08-15 RX ADMIN — CHLORHEXIDINE GLUCONATE 1 APPLICATION(S): 40 SOLUTION TOPICAL at 05:44

## 2024-08-15 RX ADMIN — LOSARTAN POTASSIUM 25 MILLIGRAM(S): 50 TABLET ORAL at 05:43

## 2024-08-15 RX ADMIN — Medication 1 TABLET(S): at 12:15

## 2024-08-15 RX ADMIN — METOPROLOL TARTRATE 12.5 MILLIGRAM(S): 100 TABLET ORAL at 17:12

## 2024-08-15 RX ADMIN — Medication 75 MILLIGRAM(S): at 12:15

## 2024-08-15 RX ADMIN — POLYETHYLENE GLYCOL 3350 17 GRAM(S): 17 POWDER, FOR SOLUTION ORAL at 12:15

## 2024-08-15 RX ADMIN — ENOXAPARIN SODIUM 30 MILLIGRAM(S): 100 INJECTION SUBCUTANEOUS at 05:43

## 2024-08-15 RX ADMIN — Medication 10 MILLIGRAM(S): at 21:06

## 2024-08-15 RX ADMIN — CHLORHEXIDINE GLUCONATE 15 MILLILITER(S): 40 SOLUTION TOPICAL at 05:44

## 2024-08-15 RX ADMIN — METOPROLOL TARTRATE 12.5 MILLIGRAM(S): 100 TABLET ORAL at 05:43

## 2024-08-15 RX ADMIN — CHLORHEXIDINE GLUCONATE 1 APPLICATION(S): 40 SOLUTION TOPICAL at 05:43

## 2024-08-15 RX ADMIN — Medication 1 MILLIGRAM(S): at 12:15

## 2024-08-15 RX ADMIN — Medication 50 MILLIGRAM(S): at 21:06

## 2024-08-15 RX ADMIN — TAMSULOSIN HYDROCHLORIDE 0.4 MILLIGRAM(S): 0.4 CAPSULE ORAL at 21:05

## 2024-08-15 RX ADMIN — Medication 2 TABLET(S): at 21:06

## 2024-08-15 RX ADMIN — AMLODIPINE BESYLATE 5 MILLIGRAM(S): 10 TABLET ORAL at 05:43

## 2024-08-15 RX ADMIN — ENOXAPARIN SODIUM 30 MILLIGRAM(S): 100 INJECTION SUBCUTANEOUS at 17:12

## 2024-08-15 RX ADMIN — Medication 1 APPLICATION(S): at 17:13

## 2024-08-15 RX ADMIN — Medication 100 MILLIGRAM(S): at 12:15

## 2024-08-15 RX ADMIN — CHLORHEXIDINE GLUCONATE 15 MILLILITER(S): 40 SOLUTION TOPICAL at 17:12

## 2024-08-15 NOTE — PROGRESS NOTE ADULT - ASSESSMENT
The patient underwent repeated neuropsychological testing in Polish to rule out poor performance due to language barriers and to monitor cognitive recovery. The results from this evaluation were consistent with those from the initial evaluation on 08/06. Although there was a slight improvement in simple auditory attention (Digit Span Forward = 4), with an extension by one digit, working memory remains notably impaired (Digit Span Backward = 2).    Performance on measures of verbal learning, repetition, and retrieval (0/4 Free Recall/Cued Recall; 4/4 with Multiple Choice), as well as abstract reasoning and judgment, did not show improvement compared to the initial evaluation. Importantly, these deficits were observed regardless of the language used to present the stimuli (Polish or English), indicating that the patient's cognitive challenges are not attributable to a primary language deficit.    Neuropsychological Findings:    The patient's cognitive profile is indicative of prominent frontal-subcortical dysfunction. The impairments in verbal learning and repetition are likely secondary to deficits in working memory and executive functioning, rather than a language processing issue. This pattern of performance suggests an acute change from the patient's baseline cognitive functioning, consistent with disruption of frontal-subcortical circuits—a common consequence of traumatic brain injury (TBI).    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 Although there was no reported history of premorbid cognitive or functional deficits, the patient's age increases the risk of cognitive decline following head trauma, likely exacerbated by age-related reductions in parenchymal brain volume (as observed on cranial CT). While the cranial CT showed no acute structural pathology, it is important to consider that head trauma can initiate a cascade of metabolic changes in the brain, which may impair cognitive functioning. However, the possibility of undetected premorbid cognitive deficits cannot be completely ruled out, especially given the severity of the current cognitive dysfunction and the initial delirium observed during the SICU stay.    Recommendations:    Cognitive Monitoring and Remediation: Continue to closely monitor cognitive functioning. Implement cognitive remediation strategies to support recovery.  Language Support: Utilize a  as needed to ensure comprehension during interventions.  Environmental Modifications: Provide simple and clear instructions. Use frequent verbal and visual reorientation cues. Consider placing the patient on overstimulation precautions, particularly for light sensitivity.  Further Imaging: Consider an MRI to investigate potential brain injuries not visible on the cranial CT.  Prognosis:    The prognosis remains guarded, given the limited evidence of cognitive recovery to date. Continued monitoring and supportive interventions are crucial to optimizing the patient's cognitive outcomes.    Plan:    Retain the patient on neuropsychological services.  Monitor mental status and cognition regularly.  Provide cognitive remediation as indicated and re-evaluate as needed.  Offer feedback and psychoeducation to the patient and family before discharge.

## 2024-08-15 NOTE — PROGRESS NOTE ADULT - ASSESSMENT
ASSESSMENT/PLAN  79 y.o. M with PMH of CAD w/ prior MI s/p CABG (2000), HTN, HLD, MVR, TEO noncompliant with CPAP, recent diagnosis of prostate CA presented on 7/26/2024 to SICU after sustaining injuries from a mechanical fall (+HT, -LOC, -AC).  CT panscan revealed extensive b/l facial Type III [place] fractures (bilateral facial fx traversing bilateral orbits, nasal bones, nasal septum, maxillary sinus, pterygoid plates and left zygomatic arch) and soft tissue swelling. OMFS consulted. Patient underwent (8/1) ORIF of multiple facial fractures, bilateral internal maxillary arteries embolization by neuroendovascular.    Rehab for traumatic head trauma sustaining multiple Facial Bone Fractures, [place] Type III, epistaxis S/P Bilateral Internal Maxillary Arteries embolization and ORIF of Facial Bone Fracture, [place] Type III 2/2 mechanical fall      #Traumatic mechanical fall   #Head Trauma   #Multiple Facial Bone Fracture, [place] Type III.   #Epistaxis (resolved)   #S/P Bilateral Internal Maxillary Arteries embolization    #S/P ORIF of Facial Bone Fracture, [place] Type III   #S/P tracheostomy   #Gait abnormality  CT panscan revealed extensive b/l fascial Type III [place] fractures (bilateral facial fx traversing bilateral orbits, nasal bones, nasal septum, maxillary sinus, pterygoid plates and left zygomatic arch) and soft tissue swelling.    OMFS consulted. Patient underwent (8/1) ORIF of multiple fascial fractures.    ENT consulted for ongoing nasal bleeding s/p rhino rocket. Continues to bleed. Underwent bilateral internal maxillary arteries embolization by neuroendovascular. f/u Dr. Marcum outpatient    Ophthalmology consulted/ for  b/l orbital fractures, no intervention Tracheostomy for airway protection, decannulation 8/8/2024  - Unasyn 3g q6h stopped 8/12 as per OMFS  - Pain control: Tylenol, oxycodone 5 mg q4hr PRN (avoid NSAID)    - Precaution: fall, sinus precautions (open mouth sneezing, no nose blowing), Head of bed elevated @ 30 degrees   - encourage incentive spirometry   - staples removed by OMFS 8/12; patient cleared to shower; there was one remaining stable which was removed by rehab resident on 8/14  - Monitor for acute bleeding      #L optic neuropathy    - Ophthalmology consulted, believed to be chronic, no acute intervention.   - Recommend outpatient f/u for MRI brain/orbits, heavy metals, B12, folate, syphilis, SARAI, ACE, antiphospholipid antibx     #leukocytosis (resolved)  - WBC downtrending, low grade temp on 8/9might 2/2 recent steroid use and Percedex  - WBC wnl, afebrile for 48 hours  - Unasyn stopped 8/12 as per OMFS  - monitor vitals     #L ICA occlusion, R ICA stenosis    - 7/26 CTA neck showed Occlusion of L ICA at the L carotid bulb; Occlusion of the distal cervical portion of the left ICA; Severe senosis at the right carotid bulb.  No evidence of vascular injury, dissection, or extravasation.   - Echo (10/2023): EF 63%, mildly-enlarged LA, no stress-induced wall motion abnormalities, moderate MR, TR, and AR, sclerotic as per OMFS aortic valve, mild VA, borderline pulmonary HTN, mild aortic root calcification   - Vascular consulted. Recommend outpatient F/U w/ Dr. Quigley for surgical planning for possible Right carotid endarterectomy     #CAD w/ prior MI s/p CABG (2000)   #MVR  #HTN   #HLD   - c/w ASA + Plavix    - Home med: Metoprolol 50 mg qd, losartan 100 mg QD, amlodipine 5 mg QD, HCTZ 25 mg QD, Ezetimibe-Simvastatin 10-10   - While inpt: continue metoprolol 12.5 mg bid, losartan 25 mg daily, amlodipine 5 mg QD, HCTZ 25 mg QD   - Continue simvastatin 10 mg   - Monitor and adjust as needed      #acute blood loss anemia    - Hb (8/8/2024) 8.4   - received 1u PRBC  - transfuse if Hb < 7  - type and screen      #urinary retention (resolved)  - c/w Flomax 0.4 mg qhs  - monitor       #TEO    - Noncompliant with home CPAP   - Hold CPAP while facial injuries heal    -Pain control: Tylenol 650 mg q6h, Oxycodone 5 mg q4hr PRN     -GI/Bowel Mgmt: Miralax/senna    -Bladder management: voiding spontaneously      -Skin: fascial bruising, ecchymosis b/l orbit, L knee contusion    - Diet: Pureed diet  Precautions / PROPHYLAXIS:  Falls, sinus precautions (open mouth sneezing, no nose blowing), Head of bed elevated @ 30 degrees   - Ortho: Weight bearing status: no weight bearing restrictions     - DVT prophylaxis: Lovenox 30 mg q12h

## 2024-08-15 NOTE — PROGRESS NOTE ADULT - SUBJECTIVE AND OBJECTIVE BOX
Patient is a 79y old  Male who presents with a chief complaint of Rehab for traumatic head trauma sustaining multiple Facial Bone Fracture, [place] Type III, epistaxis S/P Bilateral Internal Maxillary Arteries embolization and ORIF of Facial Bone Fracture, [place] Type III 2/2 mechanical fall (09 Aug 2024 14:51)      HPI:  August 11, 2024 admission    79 y.o. M Nepali speaking  with PMH of CAD w/ prior MI s/p CABG (2000), HTN, HLD, MVR, TEO noncompliant with CPAP, recent diagnosis of prostate CA presented on 7/26/2024 to SICU after sustaining injuries from a mechanical fall (+HT, -LOC, -AC). Patient was walking out of his house to see his Cardiologist (Dr. Basurto) when he tripped down his front 2 steps and landed forward on his face onto the concrete.  CT panscan revealed extensive b/l fascial Type III [place] fractures (bilateral facial fx traversing bilateral orbits, nasal bones, nasal septum, maxillary sinus, pterygoid plates and left zygomatic arch) and soft tissue swelling. OMFS consulted. Patient underwent (8/1) ORIF of multiple fascial fractures. ENT consulted for ongoing nasal bleeding s/p rhino rocket. Continues to bleed. Underwent bilateral internal maxillary arteries embolization by neuroendovascular. 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       During patient's hospital course, he developed delirium and violent behavior, code [facility] called and received Zyprexa and Precedex. Also transfuses 1u PRBC 2/2 acute blood loss anemia. Additionally, patient required tracheostomy (8/1) for airway protection, decannulation 8/8/2024. Labs significant for leukocytosis that continues to downtrend, no active infection identified.  On 8/9/2024, just prior to discharge to acute rehabilitation patient spike a fever of 100.8; therefore admission was postponed. Repeat vitals have been stable, labs reviewed. Patient has not spike a fever in the past 48 hours and is medically stable to admit to acute rehabilitation on 8/11/2024    During their stay, the patient was evaluated by physical and occupational therapy. Prior to admission, patient was independent with ambulation and ADLs. The most recent evaluated functional status is Kevin for bed mobility, modA for transfers, modA for ambulation x30 feet with RW, set up for upper body dressing, and contact guard for lower body dressing. Patient was deemed a good acute rehab candidate due to decline in functional status and ability to carry out activities of daily living. Patient is able to tolerate 3 hours of therapy 5-6 days a week and is motivated to participate.     LS: Lives with his wife in a private house with 5 [address] and 14 flight of stairs to the 2nd floor bedroom.    PLOF: Independent with ADLs and IADLs. Ambulated independently without any AD.        (09 Aug 2024 14:51)      I examined the patient and reviewed the chart. There have been no significant changes since my history and physical except where documented below.    TODAY'S SUBJECTIVE & REVIEW OF SYMPTOMS  Patient was seen and assessed at bedside.   No overnight events.   Patient denies any new complaints at this time.   Tolerating PT/OT.   Tolerating oral diet.   Voiding and passing stool spontaneously.   Vital signs reviewed.  There was a remaining stable on the head; was removed by by writer yesterday.     CLOF: 100' RW supervision, Transfers SA, UBD set up. LBD PA    Constiutional:    [ x  ] WNL           [   ] poor appetite   [   ] insomnia   [   ] tired   Cardio:                [x   ] WNL           [   ] CP   [   ] MCDANIEL   [   ] palpitations               Resp:                   [ x  ] WNL           [   ] SOB   [   ] cough   [   ] wheezing   GI:                        [ x  ] WNL           [   ] constipation   [   ] diarrhea   [   ] abdominal pain   [   ] nausea   [   ] emesis                                :                      [  x ] WNL           [   ] SOLORZANO  [   ] dysuria   [   ] difficulty voiding             Endo:                   [  x ] WNL          [   ] polyuria   [   ] temperature intolerance                 Skin:                     [   ] WNL          [   ] pain   [ x  ] wound   [   ] rash   MSK:                    [x   ] WNL          [   ] muscle pain   [   ] joint pain/ stiffness   [   ] muscle tenderness   [   ] swelling   Neuro:                 [   ] WNL          [   ] HA   [   x] change in vision   [x   ] tremor: improving   [ x  ] weakness   [   ]dysphagia              Cognitive:           [ x  ] WNL           [   ]confusion      Psych:                  [ x  ] WNL           [   ] hallucinations   [   ]agitation   [   ] delusion   [   ]depression    PHYSICAL EXAM  T(C): 36.9 (08-15-24 @ 05:57), Max: 36.9 (08-15-24 @ 05:57)  T(F): 98.4 (08-15-24 @ 05:57), Max: 98.4 (08-15-24 @ 05:57)  HR: 76 (08-15-24 @ 05:57) (73 - 76)  BP: 121/72 (08-15-24 @ 05:57) (121/64 - 136/67)  ABP: --  ABP(mean): --  RR: 18 (08-15-24 @ 05:57) (18 - 18)  SpO2: 98% (08-14-24 @ 21:07) (98% - 98%)      General:[ x  ] NAD, Resting Comfortable,   [   ] other:                                HEENT: [  x ] NC/AT, EOMI, PERRL , [] Normal Conjunctivae,   [   ] other: decreased L with L subconjuctival hemorrhage   Cardio: [  x ] RRR, [x] grade II/VI systolic ejection murmur,   [   ] other:                           Pulm: [ x  ] No Respiratory Distress,  Lungs CTAB,   [   ] other:                       Abdomen: [ x  ]ND/NT, Soft,   [   ] other:    : [ x  ] NO SOLORZANO CATHETER, [   ] SOLORZANO CATHETER- no meatal tear, no discharge, [   ] other:                          MSK: [ x  ] No joint swelling, Full ROM, appropriate passive and active ROM,   [   ] other:  diffuse resting tremors  Ext: [  x ]No C/C/E, No calf tenderness,   [   ]other:    Skin: [   ]intact,   [  x ] other:  fascial bruising, b/l orbital ecchymosis, L knee contusion                                                                Neurological Examination:  Cognitive: [    ] AAO x 2 not orientated to time,   [    ]  other:                                                                      Attention:  [ x   ] intact,   [    ]  other:  able to do serial 7s                          Memory: [  x  ] intact,    [    ]  other:     Mood/Affect: [ x   ] wnl,    [    ]  other:                                                                             Communication: [  x  ]Fluent, no dysarthria, following commands:  [    ] other:   CN II - XII:  [ x   ] intact except for decreased L vision,  [    ] other:                                                                                        Motor:   RIGHT UE: [   ] WNL,  [   ] other: Shoulder abduction 5/5, Elbow flexion 5/5, Elbow extension 5/5, Wrist Extension 5/5, Finger abduction 5/5,  4+/5   LEFT    UE: [   ] WNL,  [   ] other: Shoulder abduction 5/5, Elbow flexion 5/5, Elbow extension 5/5, Wrist Extension 5/5, Finger abduction 5/5,  4+/5     RIGHT LE: [   ] WNL,  [   ] other: Hip flexion 4-/5, knee extension 4-/5, plantarflexion 5/5, dorsiflexion 5/5  LEFT    LE: [   ] WNL,  [   ] other: Hip flexion 5/5, 5/5  knee extension/plantarflexion/dorsiflexion     Tone: [   x ] wnl,   [    ]  other:  DTRs: [x   ]symmetric, [   ] other:  Coordination:   [  x  ] intact,   [    ] other:                                                                           Sensory: [  x  ] Intact to light touch,   [    ] other:    MEDICATIONS  (STANDING):  amLODIPine   Tablet 5 milliGRAM(s) Oral daily  aspirin  chewable 81 milliGRAM(s) Oral daily  bacitracin   Ointment 1 Application(s) Topical every 24 hours  chlorhexidine 0.12% Liquid 15 milliLiter(s) Swish and Spit two times a day  chlorhexidine 2% Cloths 1 Application(s) Topical <User Schedule>  chlorhexidine 2% Cloths 1 Application(s) Topical <User Schedule>  clopidogrel Tablet 75 milliGRAM(s) Oral daily  enoxaparin Injectable 30 milliGRAM(s) SubCutaneous every 12 hours  folic acid 1 milliGRAM(s) Oral daily  hydrochlorothiazide 25 milliGRAM(s) Oral daily  losartan 25 milliGRAM(s) Oral daily  melatonin 5 milliGRAM(s) Oral <User Schedule>  metoprolol tartrate 12.5 milliGRAM(s) Oral every 12 hours  multivitamin 1 Tablet(s) Oral daily  polyethylene glycol 3350 17 Gram(s) Oral daily  senna 2 Tablet(s) Oral at bedtime  simvastatin 10 milliGRAM(s) Oral at bedtime  tamsulosin 0.4 milliGRAM(s) Oral at bedtime  thiamine 100 milliGRAM(s) Oral daily  traZODone 50 milliGRAM(s) Oral every 24 hours    MEDICATIONS  (PRN):  aluminum hydroxide/magnesium hydroxide/simethicone Suspension 30 milliLiter(s) Oral every 6 hours PRN Dyspepsia  magnesium hydroxide Suspension 30 milliLiter(s) Oral daily PRN Constipation  oxyCODONE    Solution 5 milliGRAM(s) Oral every 4 hours PRN Severe Pain (7 - 10)      RECENT LABS/IMAGING                        9.1    8.46  )-----------( 376      ( 12 Aug 2024 07:01 )             28.2     08-12    137  |  101  |  17  ----------------------------<  103<H>  4.5   |  25  |  0.8    Ca    8.4      12 Aug 2024 07:01  Mg     2.2     08-12    TPro  6.1  /  Alb  3.4<L>  /  TBili  0.5  /  DBili  x   /  AST  21  /  ALT  36  /  AlkPhos  85  08-12      Urinalysis Basic - ( 12 Aug 2024 07:01 )    Color: x / Appearance: x / SG: x / pH: x  Gluc: 103 mg/dL / Ketone: x  / Bili: x / Urobili: x   Blood: x / Protein: x / Nitrite: x   Leuk Esterase: x / RBC: x / WBC x   Sq Epi: x / Non Sq Epi: x / Bacteria: x

## 2024-08-16 RX ORDER — OXYCODONE HYDROCHLORIDE 5 MG/1
5 TABLET ORAL EVERY 4 HOURS
Refills: 0 | Status: DISCONTINUED | OUTPATIENT
Start: 2024-08-17 | End: 2024-08-19

## 2024-08-16 RX ADMIN — ENOXAPARIN SODIUM 30 MILLIGRAM(S): 100 INJECTION SUBCUTANEOUS at 17:44

## 2024-08-16 RX ADMIN — ENOXAPARIN SODIUM 30 MILLIGRAM(S): 100 INJECTION SUBCUTANEOUS at 05:43

## 2024-08-16 RX ADMIN — METOPROLOL TARTRATE 12.5 MILLIGRAM(S): 100 TABLET ORAL at 17:45

## 2024-08-16 RX ADMIN — CHLORHEXIDINE GLUCONATE 15 MILLILITER(S): 40 SOLUTION TOPICAL at 17:56

## 2024-08-16 RX ADMIN — Medication 10 MILLIGRAM(S): at 21:02

## 2024-08-16 RX ADMIN — TAMSULOSIN HYDROCHLORIDE 0.4 MILLIGRAM(S): 0.4 CAPSULE ORAL at 21:02

## 2024-08-16 RX ADMIN — CHLORHEXIDINE GLUCONATE 15 MILLILITER(S): 40 SOLUTION TOPICAL at 05:45

## 2024-08-16 RX ADMIN — METOPROLOL TARTRATE 12.5 MILLIGRAM(S): 100 TABLET ORAL at 05:44

## 2024-08-16 RX ADMIN — AMLODIPINE BESYLATE 5 MILLIGRAM(S): 10 TABLET ORAL at 05:43

## 2024-08-16 RX ADMIN — Medication 100 MILLIGRAM(S): at 12:17

## 2024-08-16 RX ADMIN — CHLORHEXIDINE GLUCONATE 1 APPLICATION(S): 40 SOLUTION TOPICAL at 05:51

## 2024-08-16 RX ADMIN — Medication 1 MILLIGRAM(S): at 12:17

## 2024-08-16 RX ADMIN — Medication 50 MILLIGRAM(S): at 21:02

## 2024-08-16 RX ADMIN — Medication 75 MILLIGRAM(S): at 12:17

## 2024-08-16 RX ADMIN — Medication 1 APPLICATION(S): at 17:45

## 2024-08-16 RX ADMIN — Medication 2 TABLET(S): at 21:02

## 2024-08-16 RX ADMIN — Medication 1 TABLET(S): at 12:17

## 2024-08-16 RX ADMIN — LOSARTAN POTASSIUM 25 MILLIGRAM(S): 50 TABLET ORAL at 05:44

## 2024-08-16 RX ADMIN — Medication 81 MILLIGRAM(S): at 12:17

## 2024-08-16 NOTE — PROGRESS NOTE ADULT - ASSESSMENT
ASSESSMENT/PLAN  79 y.o. M with PMH of CAD w/ prior MI s/p CABG (2000), HTN, HLD, MVR, TEO noncompliant with CPAP, recent diagnosis of prostate CA presented on 7/26/2024 to SICU after sustaining injuries from a mechanical fall (+HT, -LOC, -AC).  CT panscan revealed extensive b/l facial Type III [place] fractures (bilateral facial fx traversing bilateral orbits, nasal bones, nasal septum, maxillary sinus, pterygoid plates and left zygomatic arch) and soft tissue swelling. OMFS consulted. Patient underwent (8/1) ORIF of multiple facial fractures, bilateral internal maxillary arteries embolization by neuroendovascular.    Rehab for traumatic head trauma sustaining multiple Facial Bone Fractures, [place] Type III, epistaxis S/P Bilateral Internal Maxillary Arteries embolization and ORIF of Facial Bone Fracture, [place] Type III 2/2 mechanical fall      #Traumatic mechanical fall   #Head Trauma   #Multiple Facial Bone Fracture, [place] Type III.   #Epistaxis (resolved)   #S/P Bilateral Internal Maxillary Arteries embolization    #S/P ORIF of Facial Bone Fracture, [place] Type III   #S/P tracheostomy   #Gait abnormality  CT panscan revealed extensive b/l fascial Type III [place] fractures (bilateral facial fx traversing bilateral orbits, nasal bones, nasal septum, maxillary sinus, pterygoid plates and left zygomatic arch) and soft tissue swelling.    OMFS consulted. Patient underwent (8/1) ORIF of multiple fascial fractures.    ENT consulted for ongoing nasal bleeding s/p rhino rocket. Continues to bleed. Underwent bilateral internal maxillary arteries embolization by neuroendovascular. f/u Dr. Marcum outpatient    Ophthalmology consulted/ for  b/l orbital fractures, no intervention Tracheostomy for airway protection, decannulation 8/8/2024  - Unasyn 3g q6h stopped 8/12 as per OMFS  - Pain control: Tylenol, oxycodone 5 mg q4hr PRN (avoid NSAID)    - Precaution: fall, sinus precautions (open mouth sneezing, no nose blowing), Head of bed elevated @ 30 degrees   - encourage incentive spirometry   - staples removed by OMFS 8/12; patient cleared to shower; there was one remaining stable which was removed by rehab resident on 8/14  - Monitor for acute bleeding      #L optic neuropathy    - Ophthalmology consulted, believed to be chronic, no acute intervention.   - Recommend outpatient f/u for MRI brain/orbits, heavy metals, B12, folate, syphilis, SARAI, ACE, antiphospholipid antibx     #leukocytosis (resolved)  - WBC downtrending, low grade temp on 8/9might 2/2 recent steroid use and Percedex  - WBC wnl, afebrile for 48 hours  - Unasyn stopped 8/12 as per OMFS  - monitor vitals     #L ICA occlusion, R ICA stenosis    - 7/26 CTA neck showed Occlusion of L ICA at the L carotid bulb; Occlusion of the distal cervical portion of the left ICA; Severe senosis at the right carotid bulb.  No evidence of vascular injury, dissection, or extravasation.   - Echo (10/2023): EF 63%, mildly-enlarged LA, no stress-induced wall motion abnormalities, moderate MR, TR, and AR, sclerotic as per OMFS aortic valve, mild NH, borderline pulmonary HTN, mild aortic root calcification   - Vascular consulted. Recommend outpatient F/U w/ Dr. Quigley for surgical planning for possible Right carotid endarterectomy     #CAD w/ prior MI s/p CABG (2000)   #MVR  #HTN   #HLD   - c/w ASA + Plavix    - Home med: Metoprolol 50 mg qd, losartan 100 mg QD, amlodipine 5 mg QD, HCTZ 25 mg QD, Ezetimibe-Simvastatin 10-10   - While inpt: continue metoprolol 12.5 mg bid, losartan 25 mg daily, amlodipine 5 mg QD, HCTZ 25 mg QD   - Continue simvastatin 10 mg   - Monitor and adjust as needed      #acute blood loss anemia    - Hb (8/8/2024) 8.4   - received 1u PRBC  - transfuse if Hb < 7  - type and screen      #urinary retention (resolved)  - c/w Flomax 0.4 mg qhs  - monitor       #TEO    - Noncompliant with home CPAP   - Hold CPAP while facial injuries heal    -Pain control: Tylenol 650 mg q6h, Oxycodone 5 mg q4hr PRN     -GI/Bowel Mgmt: Miralax/senna    -Bladder management: voiding spontaneously      -Skin: fascial bruising, ecchymosis b/l orbit, L knee contusion    - Diet: Pureed diet  Precautions / PROPHYLAXIS:  Falls, sinus precautions (open mouth sneezing, no nose blowing), Head of bed elevated @ 30 degrees   - Ortho: Weight bearing status: no weight bearing restrictions     - DVT prophylaxis: Lovenox 30 mg q12h

## 2024-08-16 NOTE — PROGRESS NOTE ADULT - SUBJECTIVE AND OBJECTIVE BOX
Patient is a 79y old  Male who presents with a chief complaint of Rehab for traumatic head trauma sustaining multiple Facial Bone Fracture, [place] Type III, epistaxis S/P Bilateral Internal Maxillary Arteries embolization and ORIF of Facial Bone Fracture, [place] Type III 2/2 mechanical fall (09 Aug 2024 14:51)      HPI:  August 11, 2024 admission    79 y.o. M Romanian speaking  with PMH of CAD w/ prior MI s/p CABG (2000), HTN, HLD, MVR, TEO noncompliant with CPAP, recent diagnosis of prostate CA presented on 7/26/2024 to SICU after sustaining injuries from a mechanical fall (+HT, -LOC, -AC). Patient was walking out of his house to see his Cardiologist (Dr. Basurto) when he tripped down his front 2 steps and landed forward on his face onto the concrete.  CT panscan revealed extensive b/l fascial Type III [place] fractures (bilateral facial fx traversing bilateral orbits, nasal bones, nasal septum, maxillary sinus, pterygoid plates and left zygomatic arch) and soft tissue swelling. OMFS consulted. Patient underwent (8/1) ORIF of multiple fascial fractures. ENT consulted for ongoing nasal bleeding s/p rhino rocket. Continues to bleed. Underwent bilateral internal maxillary arteries embolization by neuroendovascular. 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       During patient's hospital course, he developed delirium and violent behavior, code [facility] called and received Zyprexa and Precedex. Also transfuses 1u PRBC 2/2 acute blood loss anemia. Additionally, patient required tracheostomy (8/1) for airway protection, decannulation 8/8/2024. Labs significant for leukocytosis that continues to downtrend, no active infection identified.  On 8/9/2024, just prior to discharge to acute rehabilitation patient spike a fever of 100.8; therefore admission was postponed. Repeat vitals have been stable, labs reviewed. Patient has not spike a fever in the past 48 hours and is medically stable to admit to acute rehabilitation on 8/11/2024    During their stay, the patient was evaluated by physical and occupational therapy. Prior to admission, patient was independent with ambulation and ADLs. The most recent evaluated functional status is Kevin for bed mobility, modA for transfers, modA for ambulation x30 feet with RW, set up for upper body dressing, and contact guard for lower body dressing. Patient was deemed a good acute rehab candidate due to decline in functional status and ability to carry out activities of daily living. Patient is able to tolerate 3 hours of therapy 5-6 days a week and is motivated to participate.     LS: Lives with his wife in a private house with 5 [address] and 14 flight of stairs to the 2nd floor bedroom.    PLOF: Independent with ADLs and IADLs. Ambulated independently without any AD.        (09 Aug 2024 14:51)      I examined the patient and reviewed the chart. There have been no significant changes since my history and physical except where documented below.    TODAY'S SUBJECTIVE & REVIEW OF SYMPTOMS  Patient was seen and assessed at bedside.   No overnight events.   Patient denies any new complaints at this time.   Tolerating PT/OT.   Tolerating oral diet.   Voiding and passing stool spontaneously.   Vital signs reviewed.      CLOF: 100' RW supervision, Transfers SA, UBD set up. LBD PA    Constitutional    [ x  ] WNL           [   ] poor appetite   [   ] insomnia   [   ] tired   Cardio:                [x   ] WNL           [   ] CP   [   ] MCDANIEL   [   ] palpitations               Resp:                   [ x  ] WNL           [   ] SOB   [   ] cough   [   ] wheezing   GI:                        [ x  ] WNL           [   ] constipation   [   ] diarrhea   [   ] abdominal pain   [   ] nausea   [   ] emesis                                :                      [  x ] WNL           [   ] SOLORZANO  [   ] dysuria   [   ] difficulty voiding             Endo:                   [  x ] WNL          [   ] polyuria   [   ] temperature intolerance                 Skin:                     [   ] WNL          [   ] pain   [ x  ] wound   [   ] rash   MSK:                    [x   ] WNL          [   ] muscle pain   [   ] joint pain/ stiffness   [   ] muscle tenderness   [   ] swelling   Neuro:                 [   ] WNL          [   ] HA   [   x] change in vision   [x   ] tremor: improving   [ x  ] weakness   [   ]dysphagia              Cognitive:           [ x  ] WNL           [   ]confusion      Psych:                  [ x  ] WNL           [   ] hallucinations   [   ]agitation   [   ] delusion   [   ]depression    PHYSICAL EXAM  T(C): 37 (08-16-24 @ 05:04), Max: 37 (08-16-24 @ 05:04)  T(F): 98.6 (08-16-24 @ 05:04), Max: 98.6 (08-16-24 @ 05:04)  HR: 73 (08-16-24 @ 05:04) (73 - 82)  BP: 120/71 (08-16-24 @ 05:04) (108/64 - 125/63)  RR: 19 (08-16-24 @ 05:04) (18 - 19)    General:[ x  ] NAD, Resting Comfortable,   [   ] other:                                HEENT: [  x ] NC/AT, EOMI, PERRL , [] Normal Conjunctivae,   [   ] other: decreased L with L subconjuctival hemorrhage   Cardio: [  x ] RRR, [x] grade II/VI systolic ejection murmur,   [   ] other:                           Pulm: [ x  ] No Respiratory Distress,  Lungs CTAB,   [   ] other:                       Abdomen: [ x  ]ND/NT, Soft,   [   ] other:    : [ x  ] NO SOLORZANO CATHETER, [   ] SOLORZANO CATHETER- no meatal tear, no discharge, [   ] other:                          MSK: [ x  ] No joint swelling, Full ROM, appropriate passive and active ROM,   [   ] other:  diffuse resting tremors  Ext: [  x ]No C/C/E, No calf tenderness,   [   ]other:    Skin: [   ]intact,   [  x ] other:  fascial bruising, b/l orbital ecchymosis, L knee contusion                                                                Neurological Examination:  Cognitive: [    ] AAO x 2 not orientated to time,   [    ]  other:                                                                      Attention:  [ x   ] intact,   [    ]  other:  able to do serial 7s                          Memory: [  x  ] intact,    [    ]  other:     Mood/Affect: [ x   ] wnl,    [    ]  other:                                                                             Communication: [  x  ]Fluent, no dysarthria, following commands:  [    ] other:   CN II - XII:  [ x   ] intact except for decreased L vision,  [    ] other:                                                                                        Motor:   RIGHT UE: [   ] WNL,  [   ] other: Shoulder abduction 5/5, Elbow flexion 5/5, Elbow extension 5/5, Wrist Extension 5/5, Finger abduction 5/5,  4+/5   LEFT    UE: [   ] WNL,  [   ] other: Shoulder abduction 5/5, Elbow flexion 5/5, Elbow extension 5/5, Wrist Extension 5/5, Finger abduction 5/5,  4+/5     RIGHT LE: [   ] WNL,  [   ] other: Hip flexion 4-/5, knee extension 4-/5, plantarflexion 5/5, dorsiflexion 5/5  LEFT    LE: [   ] WNL,  [   ] other: Hip flexion 5/5, 5/5  knee extension/plantarflexion/dorsiflexion     Tone: [   x ] wnl,   [    ]  other:  DTRs: [x   ]symmetric, [   ] other:  Coordination:   [  x  ] intact,   [    ] other:                                                                           Sensory: [  x  ] Intact to light touch,   [    ] other:    MEDICATIONS  (STANDING):  amLODIPine   Tablet 5 milliGRAM(s) Oral daily  aspirin  chewable 81 milliGRAM(s) Oral daily  bacitracin   Ointment 1 Application(s) Topical every 24 hours  chlorhexidine 0.12% Liquid 15 milliLiter(s) Swish and Spit two times a day  chlorhexidine 2% Cloths 1 Application(s) Topical <User Schedule>  chlorhexidine 2% Cloths 1 Application(s) Topical <User Schedule>  clopidogrel Tablet 75 milliGRAM(s) Oral daily  enoxaparin Injectable 30 milliGRAM(s) SubCutaneous every 12 hours  folic acid 1 milliGRAM(s) Oral daily  hydrochlorothiazide 25 milliGRAM(s) Oral daily  losartan 25 milliGRAM(s) Oral daily  melatonin 5 milliGRAM(s) Oral <User Schedule>  metoprolol tartrate 12.5 milliGRAM(s) Oral every 12 hours  multivitamin 1 Tablet(s) Oral daily  polyethylene glycol 3350 17 Gram(s) Oral daily  senna 2 Tablet(s) Oral at bedtime  simvastatin 10 milliGRAM(s) Oral at bedtime  tamsulosin 0.4 milliGRAM(s) Oral at bedtime  thiamine 100 milliGRAM(s) Oral daily  traZODone 50 milliGRAM(s) Oral every 24 hours    MEDICATIONS  (PRN):  aluminum hydroxide/magnesium hydroxide/simethicone Suspension 30 milliLiter(s) Oral every 6 hours PRN Dyspepsia  magnesium hydroxide Suspension 30 milliLiter(s) Oral daily PRN Constipation  oxyCODONE    Solution 5 milliGRAM(s) Oral every 4 hours PRN Severe Pain (7 - 10)      RECENT LABS/IMAGING                        9.1    8.46  )-----------( 376      ( 12 Aug 2024 07:01 )             28.2     08-12    137  |  101  |  17  ----------------------------<  103<H>  4.5   |  25  |  0.8    Ca    8.4      12 Aug 2024 07:01  Mg     2.2     08-12    TPro  6.1  /  Alb  3.4<L>  /  TBili  0.5  /  DBili  x   /  AST  21  /  ALT  36  /  AlkPhos  85  08-12      Urinalysis Basic - ( 12 Aug 2024 07:01 )    Color: x / Appearance: x / SG: x / pH: x  Gluc: 103 mg/dL / Ketone: x  / Bili: x / Urobili: x   Blood: x / Protein: x / Nitrite: x   Leuk Esterase: x / RBC: x / WBC x   Sq Epi: x / Non Sq Epi: x / Bacteria: x

## 2024-08-17 RX ADMIN — Medication 1 MILLIGRAM(S): at 12:38

## 2024-08-17 RX ADMIN — METOPROLOL TARTRATE 12.5 MILLIGRAM(S): 100 TABLET ORAL at 17:05

## 2024-08-17 RX ADMIN — Medication 75 MILLIGRAM(S): at 12:38

## 2024-08-17 RX ADMIN — Medication 1 APPLICATION(S): at 17:07

## 2024-08-17 RX ADMIN — CHLORHEXIDINE GLUCONATE 15 MILLILITER(S): 40 SOLUTION TOPICAL at 17:05

## 2024-08-17 RX ADMIN — Medication 1 TABLET(S): at 12:38

## 2024-08-17 RX ADMIN — ENOXAPARIN SODIUM 30 MILLIGRAM(S): 100 INJECTION SUBCUTANEOUS at 17:05

## 2024-08-17 RX ADMIN — Medication 81 MILLIGRAM(S): at 12:38

## 2024-08-17 RX ADMIN — Medication 10 MILLIGRAM(S): at 22:14

## 2024-08-17 RX ADMIN — ENOXAPARIN SODIUM 30 MILLIGRAM(S): 100 INJECTION SUBCUTANEOUS at 06:29

## 2024-08-17 RX ADMIN — POLYETHYLENE GLYCOL 3350 17 GRAM(S): 17 POWDER, FOR SOLUTION ORAL at 12:38

## 2024-08-17 RX ADMIN — LOSARTAN POTASSIUM 25 MILLIGRAM(S): 50 TABLET ORAL at 06:29

## 2024-08-17 RX ADMIN — CHLORHEXIDINE GLUCONATE 1 APPLICATION(S): 40 SOLUTION TOPICAL at 06:30

## 2024-08-17 RX ADMIN — Medication 100 MILLIGRAM(S): at 12:38

## 2024-08-17 RX ADMIN — Medication 50 MILLIGRAM(S): at 22:14

## 2024-08-17 RX ADMIN — AMLODIPINE BESYLATE 5 MILLIGRAM(S): 10 TABLET ORAL at 06:30

## 2024-08-17 RX ADMIN — METOPROLOL TARTRATE 12.5 MILLIGRAM(S): 100 TABLET ORAL at 06:29

## 2024-08-17 RX ADMIN — TAMSULOSIN HYDROCHLORIDE 0.4 MILLIGRAM(S): 0.4 CAPSULE ORAL at 22:12

## 2024-08-17 RX ADMIN — CHLORHEXIDINE GLUCONATE 15 MILLILITER(S): 40 SOLUTION TOPICAL at 06:31

## 2024-08-17 NOTE — PROGRESS NOTE ADULT - ASSESSMENT
ASSESSMENT/PLAN  79 y.o. M with PMH of CAD w/ prior MI s/p CABG (2000), HTN, HLD, MVR, TEO noncompliant with CPAP, recent diagnosis of prostate CA presented on 7/26/2024 to SICU after sustaining injuries from a mechanical fall (+HT, -LOC, -AC).  CT panscan revealed extensive b/l facial Type III [place] fractures (bilateral facial fx traversing bilateral orbits, nasal bones, nasal septum, maxillary sinus, pterygoid plates and left zygomatic arch) and soft tissue swelling. OMFS consulted. Patient underwent (8/1) ORIF of multiple facial fractures, bilateral internal maxillary arteries embolization by neuroendovascular.    Rehab for traumatic head trauma sustaining multiple Facial Bone Fractures, [place] Type III, epistaxis S/P Bilateral Internal Maxillary Arteries embolization and ORIF of Facial Bone Fracture, [place] Type III 2/2 mechanical fall      #Traumatic mechanical fall   #Head Trauma   #Multiple Facial Bone Fracture, [place] Type III.   #Epistaxis (resolved)   #S/P Bilateral Internal Maxillary Arteries embolization    #S/P ORIF of Facial Bone Fracture, [place] Type III   #S/P tracheostomy   #Gait abnormality  CT panscan revealed extensive b/l fascial Type III [place] fractures (bilateral facial fx traversing bilateral orbits, nasal bones, nasal septum, maxillary sinus, pterygoid plates and left zygomatic arch) and soft tissue swelling.    OMFS consulted. Patient underwent (8/1) ORIF of multiple fascial fractures.    ENT consulted for ongoing nasal bleeding s/p rhino rocket. Continues to bleed. Underwent bilateral internal maxillary arteries embolization by neuroendovascular. f/u Dr. Marcum outpatient    Ophthalmology consulted/ for  b/l orbital fractures, no intervention Tracheostomy for airway protection, decannulation 8/8/2024  - Unasyn 3g q6h stopped 8/12 as per OMFS  - Pain control: Tylenol, oxycodone 5 mg q4hr PRN (avoid NSAID)    - Precaution: fall, sinus precautions (open mouth sneezing, no nose blowing), Head of bed elevated @ 30 degrees   - encourage incentive spirometry   - staples removed by OMFS 8/12; patient cleared to shower; there was one remaining stable which was removed by rehab resident on 8/14  - Monitor for acute bleeding      #L optic neuropathy    - Ophthalmology consulted, believed to be chronic, no acute intervention.   - Recommend outpatient f/u for MRI brain/orbits, heavy metals, B12, folate, syphilis, SARAI, ACE, antiphospholipid antibx     #leukocytosis (resolved)  - WBC downtrending, low grade temp on 8/9might 2/2 recent steroid use and Percedex  - WBC wnl, afebrile for 48 hours  - Unasyn stopped 8/12 as per OMFS  - monitor vitals     #L ICA occlusion, R ICA stenosis    - 7/26 CTA neck showed Occlusion of L ICA at the L carotid bulb; Occlusion of the distal cervical portion of the left ICA; Severe senosis at the right carotid bulb.  No evidence of vascular injury, dissection, or extravasation.   - Echo (10/2023): EF 63%, mildly-enlarged LA, no stress-induced wall motion abnormalities, moderate MR, TR, and AR, sclerotic as per OMFS aortic valve, mild NM, borderline pulmonary HTN, mild aortic root calcification   - Vascular consulted. Recommend outpatient F/U w/ Dr. Quigley for surgical planning for possible Right carotid endarterectomy     #CAD w/ prior MI s/p CABG (2000)   #MVR  #HTN   #HLD   - c/w ASA + Plavix    - Home med: Metoprolol 50 mg qd, losartan 100 mg QD, amlodipine 5 mg QD, HCTZ 25 mg QD, Ezetimibe-Simvastatin 10-10   - While inpt: continue metoprolol 12.5 mg bid, losartan 25 mg daily, amlodipine 5 mg QD, HCTZ 25 mg QD   - Continue simvastatin 10 mg   - Monitor and adjust as needed      #acute blood loss anemia    - Hb (8/8/2024) 8.4   - received 1u PRBC  - transfuse if Hb < 7  - type and screen      #urinary retention (resolved)  - c/w Flomax 0.4 mg qhs  - monitor       #TEO    - Noncompliant with home CPAP   - Hold CPAP while facial injuries heal    -Pain control: Tylenol 650 mg q6h, Oxycodone 5 mg q4hr PRN     -GI/Bowel Mgmt: Miralax/senna    -Bladder management: voiding spontaneously      -Skin: fascial bruising, ecchymosis b/l orbit, L knee contusion    - Diet: Pureed diet  Precautions / PROPHYLAXIS:  Falls, sinus precautions (open mouth sneezing, no nose blowing), Head of bed elevated @ 30 degrees   - Ortho: Weight bearing status: no weight bearing restrictions     - DVT prophylaxis: Lovenox 30 mg q12h

## 2024-08-17 NOTE — PROGRESS NOTE ADULT - SUBJECTIVE AND OBJECTIVE BOX
Patient is a 79y old  Male who presents with a chief complaint of Rehab for traumatic head trauma sustaining multiple Facial Bone Fracture, [place] Type III, epistaxis S/P Bilateral Internal Maxillary Arteries embolization and ORIF of Facial Bone Fracture, [place] Type III 2/2 mechanical fall (09 Aug 2024 14:51)      HPI:  August 11, 2024 admission    79 y.o. M Kiswahili speaking  with PMH of CAD w/ prior MI s/p CABG (2000), HTN, HLD, MVR, TEO noncompliant with CPAP, recent diagnosis of prostate CA presented on 7/26/2024 to SICU after sustaining injuries from a mechanical fall (+HT, -LOC, -AC). Patient was walking out of his house to see his Cardiologist (Dr. Basurto) when he tripped down his front 2 steps and landed forward on his face onto the concrete.  CT panscan revealed extensive b/l fascial Type III [place] fractures (bilateral facial fx traversing bilateral orbits, nasal bones, nasal septum, maxillary sinus, pterygoid plates and left zygomatic arch) and soft tissue swelling. OMFS consulted. Patient underwent (8/1) ORIF of multiple fascial fractures. ENT consulted for ongoing nasal bleeding s/p rhino rocket. Continues to bleed. Underwent bilateral internal maxillary arteries embolization by neuroendovascular. 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       During patient's hospital course, he developed delirium and violent behavior, code grey called and received Zyprexa and Precedex. Also transfuses 1u PRBC 2/2 acute blood loss anemia. Additionally, patient required tracheostomy (8/1) for airway protection, decannulation 8/8/2024. Labs significant for leukocytosis that continues to downtrend, no active infection identified.  On 8/9/2024, just prior to discharge to acute rehabilitation patient spike a fever of 100.8; therefore admission was postponed. Repeat vitals have been stable, labs reviewed. Patient has not spike a fever in the past 48 hours and is medically stable to admit to acute rehabilitation on 8/11/2024    During their stay, the patient was evaluated by physical and occupational therapy. Prior to admission, patient was independent with ambulation and ADLs. The most recent evaluated functional status is Kevin for bed mobility, modA for transfers, modA for ambulation x30 feet with RW, set up for upper body dressing, and contact guard for lower body dressing. Patient was deemed a good acute rehab candidate due to decline in functional status and ability to carry out activities of daily living. Patient is able to tolerate 3 hours of therapy 5-6 days a week and is motivated to participate.     LS: Lives with his wife in a private house with 5 [address] and 14 flight of stairs to the 2nd floor bedroom.    PLOF: Independent with ADLs and IADLs. Ambulated independently without any AD.        I examined the patient and reviewed the chart. There have been no significant changes since my history and physical except where documented below.    TODAY'S SUBJECTIVE & REVIEW OF SYMPTOMS  Patient was seen and assessed at bedside.   No overnight events.   Patient denies any new complaints at this time.   Tolerating PT/OT.   Tolerating oral diet.   Voiding and passing stool spontaneously.   Vital signs reviewed.      CLOF: 100' RW supervision, Transfers SA, UBD set up. LBD PA    Constitutional    [ x  ] WNL           [   ] poor appetite   [   ] insomnia   [   ] tired   Cardio:                [x   ] WNL           [   ] CP   [   ] MCDANIEL   [   ] palpitations               Resp:                   [ x  ] WNL           [   ] SOB   [   ] cough   [   ] wheezing   GI:                        [ x  ] WNL           [   ] constipation   [   ] diarrhea   [   ] abdominal pain   [   ] nausea   [   ] emesis                                :                      [  x ] WNL           [   ] SOLORZANO  [   ] dysuria   [   ] difficulty voiding             Endo:                   [  x ] WNL          [   ] polyuria   [   ] temperature intolerance                 Skin:                     [   ] WNL          [   ] pain   [ x  ] wound   [   ] rash   MSK:                    [x   ] WNL          [   ] muscle pain   [   ] joint pain/ stiffness   [   ] muscle tenderness   [   ] swelling   Neuro:                 [   ] WNL          [   ] HA   [   x] change in vision   [x   ] tremor: improving   [ x  ] weakness   [   ]dysphagia              Cognitive:           [ x  ] WNL           [   ]confusion      Psych:                  [ x  ] WNL           [   ] hallucinations   [   ]agitation   [   ] delusion   [   ]depression    PHYSICAL EXAM  T(C): 36.7 (08-17-24 @ 05:36), Max: 37 (08-16-24 @ 20:23)  T(F): 98.1 (08-17-24 @ 05:36), Max: 98.6 (08-16-24 @ 20:23)  HR: 79 (08-17-24 @ 05:36) (79 - 85)  BP: 135/72 (08-17-24 @ 05:36) (129/70 - 135/72)  RR: 18 (08-17-24 @ 05:36) (16 - 18)      General:[ x  ] NAD, Resting Comfortable,   [   ] other:                                HEENT: [  x ] NC/AT, EOMI, PERRL , [] Normal Conjunctivae,   [   ] other: decreased L with L subconjuctival hemorrhage   Cardio: [  x ] RRR, [x] grade II/VI systolic ejection murmur,   [   ] other:                           Pulm: [ x  ] No Respiratory Distress,  Lungs CTAB,   [   ] other:                       Abdomen: [ x  ]ND/NT, Soft,   [   ] other:    : [ x  ] NO SOLORZANO CATHETER, [   ] SOLORZANO CATHETER- no meatal tear, no discharge, [   ] other:                          MSK: [ x  ] No joint swelling, Full ROM, appropriate passive and active ROM,   [   ] other:  diffuse resting tremors  Ext: [  x ]No C/C/E, No calf tenderness,   [   ]other:    Skin: [   ]intact,   [  x ] other:  fascial bruising, b/l orbital ecchymosis, L knee contusion                                                                Neurological Examination:  Cognitive: [    ] AAO x 2 not orientated to time,   [    ]  other:                                                                      Attention:  [ x   ] intact,   [    ]  other:  able to do serial 7s                          Memory: [  x  ] intact,    [    ]  other:     Mood/Affect: [ x   ] wnl,    [    ]  other:                                                                             Communication: [  x  ]Fluent, no dysarthria, following commands:  [    ] other:   CN II - XII:  [ x   ] intact except for decreased L vision,  [    ] other:                                                                                        Motor:   RIGHT UE: [   ] WNL,  [   ] other: Shoulder abduction 5/5, Elbow flexion 5/5, Elbow extension 5/5, Wrist Extension 5/5, Finger abduction 5/5,  4+/5   LEFT    UE: [   ] WNL,  [   ] other: Shoulder abduction 5/5, Elbow flexion 5/5, Elbow extension 5/5, Wrist Extension 5/5, Finger abduction 5/5,  4+/5     RIGHT LE: [   ] WNL,  [   ] other: Hip flexion 4-/5, knee extension 4-/5, plantarflexion 5/5, dorsiflexion 5/5  LEFT    LE: [   ] WNL,  [   ] other: Hip flexion 5/5, 5/5  knee extension/plantarflexion/dorsiflexion     Tone: [   x ] wnl,   [    ]  other:  DTRs: [x   ]symmetric, [   ] other:  Coordination:   [  x  ] intact,   [    ] other:                                                                           Sensory: [  x  ] Intact to light touch,   [    ] other:    MEDICATIONS  (STANDING):  amLODIPine   Tablet 5 milliGRAM(s) Oral daily  aspirin  chewable 81 milliGRAM(s) Oral daily  bacitracin   Ointment 1 Application(s) Topical every 24 hours  chlorhexidine 0.12% Liquid 15 milliLiter(s) Swish and Spit two times a day  chlorhexidine 2% Cloths 1 Application(s) Topical <User Schedule>  chlorhexidine 2% Cloths 1 Application(s) Topical <User Schedule>  clopidogrel Tablet 75 milliGRAM(s) Oral daily  enoxaparin Injectable 30 milliGRAM(s) SubCutaneous every 12 hours  folic acid 1 milliGRAM(s) Oral daily  hydrochlorothiazide 25 milliGRAM(s) Oral daily  losartan 25 milliGRAM(s) Oral daily  melatonin 5 milliGRAM(s) Oral <User Schedule>  metoprolol tartrate 12.5 milliGRAM(s) Oral every 12 hours  multivitamin 1 Tablet(s) Oral daily  polyethylene glycol 3350 17 Gram(s) Oral daily  senna 2 Tablet(s) Oral at bedtime  simvastatin 10 milliGRAM(s) Oral at bedtime  tamsulosin 0.4 milliGRAM(s) Oral at bedtime  thiamine 100 milliGRAM(s) Oral daily  traZODone 50 milliGRAM(s) Oral every 24 hours    MEDICATIONS  (PRN):  aluminum hydroxide/magnesium hydroxide/simethicone Suspension 30 milliLiter(s) Oral every 6 hours PRN Dyspepsia  magnesium hydroxide Suspension 30 milliLiter(s) Oral daily PRN Constipation  oxyCODONE    Solution 5 milliGRAM(s) Oral every 4 hours PRN Severe Pain (7 - 10)      RECENT LABS/IMAGING                        9.1    8.46  )-----------( 376      ( 12 Aug 2024 07:01 )             28.2     08-12    137  |  101  |  17  ----------------------------<  103<H>  4.5   |  25  |  0.8    Ca    8.4      12 Aug 2024 07:01  Mg     2.2     08-12    TPro  6.1  /  Alb  3.4<L>  /  TBili  0.5  /  DBili  x   /  AST  21  /  ALT  36  /  AlkPhos  85  08-12      Urinalysis Basic - ( 12 Aug 2024 07:01 )    Color: x / Appearance: x / SG: x / pH: x  Gluc: 103 mg/dL / Ketone: x  / Bili: x / Urobili: x   Blood: x / Protein: x / Nitrite: x   Leuk Esterase: x / RBC: x / WBC x   Sq Epi: x / Non Sq Epi: x / Bacteria: x

## 2024-08-18 RX ADMIN — ENOXAPARIN SODIUM 30 MILLIGRAM(S): 100 INJECTION SUBCUTANEOUS at 17:38

## 2024-08-18 RX ADMIN — METOPROLOL TARTRATE 12.5 MILLIGRAM(S): 100 TABLET ORAL at 06:06

## 2024-08-18 RX ADMIN — Medication 10 MILLIGRAM(S): at 22:41

## 2024-08-18 RX ADMIN — Medication 1 APPLICATION(S): at 17:39

## 2024-08-18 RX ADMIN — TAMSULOSIN HYDROCHLORIDE 0.4 MILLIGRAM(S): 0.4 CAPSULE ORAL at 22:41

## 2024-08-18 RX ADMIN — Medication 50 MILLIGRAM(S): at 22:41

## 2024-08-18 RX ADMIN — CHLORHEXIDINE GLUCONATE 15 MILLILITER(S): 40 SOLUTION TOPICAL at 17:38

## 2024-08-18 RX ADMIN — AMLODIPINE BESYLATE 5 MILLIGRAM(S): 10 TABLET ORAL at 06:06

## 2024-08-18 RX ADMIN — Medication 81 MILLIGRAM(S): at 12:20

## 2024-08-18 RX ADMIN — Medication 75 MILLIGRAM(S): at 12:20

## 2024-08-18 RX ADMIN — Medication 100 MILLIGRAM(S): at 12:20

## 2024-08-18 RX ADMIN — CHLORHEXIDINE GLUCONATE 15 MILLILITER(S): 40 SOLUTION TOPICAL at 06:49

## 2024-08-18 RX ADMIN — METOPROLOL TARTRATE 12.5 MILLIGRAM(S): 100 TABLET ORAL at 17:38

## 2024-08-18 RX ADMIN — CHLORHEXIDINE GLUCONATE 1 APPLICATION(S): 40 SOLUTION TOPICAL at 06:10

## 2024-08-18 RX ADMIN — ENOXAPARIN SODIUM 30 MILLIGRAM(S): 100 INJECTION SUBCUTANEOUS at 06:06

## 2024-08-18 RX ADMIN — Medication 1 MILLIGRAM(S): at 12:19

## 2024-08-18 RX ADMIN — POLYETHYLENE GLYCOL 3350 17 GRAM(S): 17 POWDER, FOR SOLUTION ORAL at 12:19

## 2024-08-18 RX ADMIN — Medication 1 TABLET(S): at 12:20

## 2024-08-18 NOTE — PROGRESS NOTE ADULT - SUBJECTIVE AND OBJECTIVE BOX
Patient is a 79y old  Male who presents with a chief complaint of Rehab for traumatic head trauma sustaining multiple Facial Bone Fracture, [place] Type III, epistaxis S/P Bilateral Internal Maxillary Arteries embolization and ORIF of Facial Bone Fracture, [place] Type III 2/2 mechanical fall (09 Aug 2024 14:51)      HPI:  August 11, 2024 admission    79 y.o. M Greenlandic speaking  with PMH of CAD w/ prior MI s/p CABG (2000), HTN, HLD, MVR, TEO noncompliant with CPAP, recent diagnosis of prostate CA presented on 7/26/2024 to SICU after sustaining injuries from a mechanical fall (+HT, -LOC, -AC). Patient was walking out of his house to see his Cardiologist (Dr. Basurto) when he tripped down his front 2 steps and landed forward on his face onto the concrete.  CT panscan revealed extensive b/l fascial Type III [place] fractures (bilateral facial fx traversing bilateral orbits, nasal bones, nasal septum, maxillary sinus, pterygoid plates and left zygomatic arch) and soft tissue swelling. OMFS consulted. Patient underwent (8/1) ORIF of multiple fascial fractures. ENT consulted for ongoing nasal bleeding s/p rhino rocket. Continues to bleed. Underwent bilateral internal maxillary arteries embolization by neuroendovascular. 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       During patient's hospital course, he developed delirium and violent behavior, code grey called and received Zyprexa and Precedex. Also transfuses 1u PRBC 2/2 acute blood loss anemia. Additionally, patient required tracheostomy (8/1) for airway protection, decannulation 8/8/2024. Labs significant for leukocytosis that continues to downtrend, no active infection identified.  On 8/9/2024, just prior to discharge to acute rehabilitation patient spike a fever of 100.8; therefore admission was postponed. Repeat vitals have been stable, labs reviewed. Patient has not spike a fever in the past 48 hours and is medically stable to admit to acute rehabilitation on 8/11/2024    During their stay, the patient was evaluated by physical and occupational therapy. Prior to admission, patient was independent with ambulation and ADLs. The most recent evaluated functional status is Kevin for bed mobility, modA for transfers, modA for ambulation x30 feet with RW, set up for upper body dressing, and contact guard for lower body dressing. Patient was deemed a good acute rehab candidate due to decline in functional status and ability to carry out activities of daily living. Patient is able to tolerate 3 hours of therapy 5-6 days a week and is motivated to participate.     LS: Lives with his wife in a private house with 5 GERALDINE and 14 flight of stairs to the 2nd floor bedroom.    PLOF: Independent with ADLs and IADLs. Ambulated independently without any AD.        I examined the patient and reviewed the chart. There have been no significant changes since my history and physical except where documented below.    TODAY'S SUBJECTIVE & REVIEW OF SYMPTOMS  Patient was seen and assessed at bedside.   No overnight events.   Patient denies any new complaints at this time.   Tolerating PT/OT.   Tolerating oral diet.   Voiding and passing stool spontaneously.   Vital signs reviewed.    CLOF: 100' RW supervision, Transfers SA, UBD set up. LBD PA    Constitutional    [ x  ] WNL           [   ] poor appetite   [   ] insomnia   [   ] tired   Cardio:                [x   ] WNL           [   ] CP   [   ] MCDANIEL   [   ] palpitations               Resp:                   [ x  ] WNL           [   ] SOB   [   ] cough   [   ] wheezing   GI:                        [ x  ] WNL           [   ] constipation   [   ] diarrhea   [   ] abdominal pain   [   ] nausea   [   ] emesis                                :                      [  x ] WNL           [   ] SOLORZANO  [   ] dysuria   [   ] difficulty voiding             Endo:                   [  x ] WNL          [   ] polyuria   [   ] temperature intolerance                 Skin:                     [   ] WNL          [   ] pain   [ x  ] wound   [   ] rash   MSK:                    [x   ] WNL          [   ] muscle pain   [   ] joint pain/ stiffness   [   ] muscle tenderness   [   ] swelling   Neuro:                 [   ] WNL          [   ] HA   [   x] change in vision   [x   ] tremor: improving   [ x  ] weakness   [   ]dysphagia              Cognitive:           [ x  ] WNL           [   ]confusion      Psych:                  [ x  ] WNL           [   ] hallucinations   [   ]agitation   [   ] delusion   [   ]depression    PHYSICAL EXAM  T(C): 36.7 (08-18-24 @ 05:57), Max: 36.9 (08-17-24 @ 12:32)  T(F): 98.1 (08-18-24 @ 05:57), Max: 98.4 (08-17-24 @ 12:32)  HR: 73 (08-18-24 @ 05:57) (73 - 83)  BP: 119/65 (08-18-24 @ 05:57) (112/60 - 133/65)  ABP: --  ABP(mean): --  RR: 17 (08-18-24 @ 05:57) (16 - 18)  SpO2: --    General:[ x  ] NAD, Resting Comfortable,   [   ] other:                                HEENT: [  x ] NC/AT, EOMI, PERRL , [] Normal Conjunctivae,   [   ] other: decreased L with L subconjuctival hemorrhage   Cardio: [  x ] RRR, [x] grade II/VI systolic ejection murmur,   [   ] other:                           Pulm: [ x  ] No Respiratory Distress,  Lungs CTAB,   [   ] other:                       Abdomen: [ x  ]ND/NT, Soft,   [   ] other:    : [ x  ] NO SOLORZANO CATHETER, [   ] SOLORZANO CATHETER- no meatal tear, no discharge, [   ] other:                          MSK: [ x  ] No joint swelling, Full ROM, appropriate passive and active ROM,   [   ] other:  diffuse resting tremors  Ext: [  x ]No C/C/E, No calf tenderness,   [   ]other:    Skin: [   ]intact,   [  x ] other:  fascial bruising, b/l orbital ecchymosis, L knee contusion                                                                Neurological Examination:  Cognitive: [    ] AAO x 2 not orientated to time,   [    ]  other:                                                                      Attention:  [ x   ] intact,   [    ]  other:  able to do serial 7s                          Memory: [  x  ] intact,    [    ]  other:     Mood/Affect: [ x   ] wnl,    [    ]  other:                                                                             Communication: [  x  ]Fluent, no dysarthria, following commands:  [    ] other:   CN II - XII:  [ x   ] intact except for decreased L vision,  [    ] other:                                                                                        Motor:   RIGHT UE: [   ] WNL,  [   ] other: Shoulder abduction 5/5, Elbow flexion 5/5, Elbow extension 5/5, Wrist Extension 5/5, Finger abduction 5/5,  4+/5   LEFT    UE: [   ] WNL,  [   ] other: Shoulder abduction 5/5, Elbow flexion 5/5, Elbow extension 5/5, Wrist Extension 5/5, Finger abduction 5/5,  4+/5     RIGHT LE: [   ] WNL,  [   ] other: Hip flexion 4-/5, knee extension 4-/5, plantarflexion 5/5, dorsiflexion 5/5  LEFT    LE: [   ] WNL,  [   ] other: Hip flexion 5/5, 5/5  knee extension/plantarflexion/dorsiflexion     Tone: [   x ] wnl,   [    ]  other:  DTRs: [x   ]symmetric, [   ] other:  Coordination:   [  x  ] intact,   [    ] other:                                                                           Sensory: [  x  ] Intact to light touch,   [    ] other:    MEDICATIONS  (STANDING):  amLODIPine   Tablet 5 milliGRAM(s) Oral daily  aspirin  chewable 81 milliGRAM(s) Oral daily  bacitracin   Ointment 1 Application(s) Topical every 24 hours  chlorhexidine 0.12% Liquid 15 milliLiter(s) Swish and Spit two times a day  chlorhexidine 2% Cloths 1 Application(s) Topical <User Schedule>  chlorhexidine 2% Cloths 1 Application(s) Topical <User Schedule>  clopidogrel Tablet 75 milliGRAM(s) Oral daily  enoxaparin Injectable 30 milliGRAM(s) SubCutaneous every 12 hours  folic acid 1 milliGRAM(s) Oral daily  hydrochlorothiazide 25 milliGRAM(s) Oral daily  losartan 25 milliGRAM(s) Oral daily  melatonin 5 milliGRAM(s) Oral <User Schedule>  metoprolol tartrate 12.5 milliGRAM(s) Oral every 12 hours  multivitamin 1 Tablet(s) Oral daily  polyethylene glycol 3350 17 Gram(s) Oral daily  senna 2 Tablet(s) Oral at bedtime  simvastatin 10 milliGRAM(s) Oral at bedtime  tamsulosin 0.4 milliGRAM(s) Oral at bedtime  thiamine 100 milliGRAM(s) Oral daily  traZODone 50 milliGRAM(s) Oral every 24 hours    MEDICATIONS  (PRN):  aluminum hydroxide/magnesium hydroxide/simethicone Suspension 30 milliLiter(s) Oral every 6 hours PRN Dyspepsia  magnesium hydroxide Suspension 30 milliLiter(s) Oral daily PRN Constipation  oxyCODONE    Solution 5 milliGRAM(s) Oral every 4 hours PRN Severe Pain (7 - 10)      RECENT LABS/IMAGING                        9.1    8.46  )-----------( 376      ( 12 Aug 2024 07:01 )             28.2     08-12    137  |  101  |  17  ----------------------------<  103<H>  4.5   |  25  |  0.8    Ca    8.4      12 Aug 2024 07:01  Mg     2.2     08-12    TPro  6.1  /  Alb  3.4<L>  /  TBili  0.5  /  DBili  x   /  AST  21  /  ALT  36  /  AlkPhos  85  08-12      Urinalysis Basic - ( 12 Aug 2024 07:01 )    Color: x / Appearance: x / SG: x / pH: x  Gluc: 103 mg/dL / Ketone: x  / Bili: x / Urobili: x   Blood: x / Protein: x / Nitrite: x   Leuk Esterase: x / RBC: x / WBC x   Sq Epi: x / Non Sq Epi: x / Bacteria: x   Patient is a 79y old  Male who presents with a chief complaint of Rehab for traumatic head trauma sustaining multiple Facial Bone Fracture, [place] Type III, epistaxis S/P Bilateral Internal Maxillary Arteries embolization and ORIF of Facial Bone Fracture, [place] Type III 2/2 mechanical fall (09 Aug 2024 14:51)      HPI:  August 11, 2024 admission    79 y.o. M Faroese speaking  with PMH of CAD w/ prior MI s/p CABG (2000), HTN, HLD, MVR, TEO noncompliant with CPAP, recent diagnosis of prostate CA presented on 7/26/2024 to SICU after sustaining injuries from a mechanical fall (+HT, -LOC, -AC). Patient was walking out of his house to see his Cardiologist (Dr. Basurto) when he tripped down his front 2 steps and landed forward on his face onto the concrete.  CT panscan revealed extensive b/l fascial Type III [place] fractures (bilateral facial fx traversing bilateral orbits, nasal bones, nasal septum, maxillary sinus, pterygoid plates and left zygomatic arch) and soft tissue swelling. OMFS consulted. Patient underwent (8/1) ORIF of multiple fascial fractures. ENT consulted for ongoing nasal bleeding s/p rhino rocket. Continues to bleed. Underwent bilateral internal maxillary arteries embolization by neuroendovascular. 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       During patient's hospital course, he developed delirium and violent behavior, code grey called and received Zyprexa and Precedex. Also transfuses 1u PRBC 2/2 acute blood loss anemia. Additionally, patient required tracheostomy (8/1) for airway protection, decannulation 8/8/2024. Labs significant for leukocytosis that continues to downtrend, no active infection identified.  On 8/9/2024, just prior to discharge to acute rehabilitation patient spike a fever of 100.8; therefore admission was postponed. Repeat vitals have been stable, labs reviewed. Patient has not spike a fever in the past 48 hours and is medically stable to admit to acute rehabilitation on 8/11/2024    During their stay, the patient was evaluated by physical and occupational therapy. Prior to admission, patient was independent with ambulation and ADLs. The most recent evaluated functional status is Kevin for bed mobility, modA for transfers, modA for ambulation x30 feet with RW, set up for upper body dressing, and contact guard for lower body dressing. Patient was deemed a good acute rehab candidate due to decline in functional status and ability to carry out activities of daily living. Patient is able to tolerate 3 hours of therapy 5-6 days a week and is motivated to participate.     LS: Lives with his wife in a private house with 5 GERALDINE and 14 flight of stairs to the 2nd floor bedroom.    PLOF: Independent with ADLs and IADLs. Ambulated independently without any AD.        TODAY'S SUBJECTIVE & REVIEW OF SYMPTOMS  Patient was seen and assessed at bedside.   No overnight events.   Patient denies any new complaints at this time.   Tolerating PT/OT.   Tolerating oral diet.   Voiding and passing stool spontaneously.   Vital signs reviewed.    CLOF: 100' RW supervision, Transfers SA, UBD set up. LBD PA    Constitutional    [ x  ] WNL           [   ] poor appetite   [   ] insomnia   [   ] tired   Cardio:                [x   ] WNL           [   ] CP   [   ] MCDANIEL   [   ] palpitations               Resp:                   [ x  ] WNL           [   ] SOB   [   ] cough   [   ] wheezing   GI:                        [ x  ] WNL           [   ] constipation   [   ] diarrhea   [   ] abdominal pain   [   ] nausea   [   ] emesis                                :                      [  x ] WNL           [   ] SOLORZANO  [   ] dysuria   [   ] difficulty voiding             Endo:                   [  x ] WNL          [   ] polyuria   [   ] temperature intolerance                 Skin:                     [   ] WNL          [   ] pain   [ x  ] wound   [   ] rash   MSK:                    [x   ] WNL          [   ] muscle pain   [   ] joint pain/ stiffness   [   ] muscle tenderness   [   ] swelling   Neuro:                 [   ] WNL          [   ] HA   [   x] change in vision   [x   ] tremor: improving   [ x  ] weakness   [   ]dysphagia              Cognitive:           [ x  ] WNL           [   ]confusion      Psych:                  [ x  ] WNL           [   ] hallucinations   [   ]agitation   [   ] delusion   [   ]depression    PHYSICAL EXAM    T(C): 36.7 (08-18-24 @ 05:57), Max: 36.9 (08-17-24 @ 12:32)  T(F): 98.1 (08-18-24 @ 05:57), Max: 98.4 (08-17-24 @ 12:32)  HR: 73 (08-18-24 @ 05:57) (73 - 83)  BP: 119/65 (08-18-24 @ 05:57) (112/60 - 133/65)  ABP: --  ABP(mean): --  RR: 17 (08-18-24 @ 05:57) (16 - 18)  SpO2: --    General:[ x  ] NAD, Resting Comfortable,   [   ] other:                                HEENT: [  x ] NC/AT, EOMI, PERRL , [] Normal Conjunctivae,   [   ] other: decreased L with L subconjuctival hemorrhage   Cardio: [  x ] RRR, [x] grade II/VI systolic ejection murmur,   [   ] other:                           Pulm: [ x  ] No Respiratory Distress,  Lungs CTAB,   [   ] other:                       Abdomen: [ x  ]ND/NT, Soft,   [   ] other:    : [ x  ] NO SOLORZANO CATHETER, [   ] SOLORZANO CATHETER- no meatal tear, no discharge, [   ] other:                          MSK: [ x  ] No joint swelling, Full ROM, appropriate passive and active ROM,   [   ] other:  diffuse resting tremors  Ext: [  x ]No C/C/E, No calf tenderness,   [   ]other:    Skin: [   ]intact,   [  x ] other:  fascial bruising, b/l orbital ecchymosis, L knee contusion                                                                Neurological Examination:  Cognitive: [    ] AAO x 2 not orientated to time,   [    ]  other:                                                                      Attention:  [ x   ] intact,   [    ]  other:  able to do serial 7s                          Memory: [  x  ] intact,    [    ]  other:     Mood/Affect: [ x   ] wnl,    [    ]  other:                                                                             Communication: [  x  ]Fluent, no dysarthria, following commands:  [    ] other:   CN II - XII:  [ x   ] intact except for decreased L vision,  [    ] other:                                                                                        Motor:   RIGHT UE: [   ] WNL,  [   ] other: Shoulder abduction 5/5, Elbow flexion 5/5, Elbow extension 5/5, Wrist Extension 5/5, Finger abduction 5/5,  4+/5   LEFT    UE: [   ] WNL,  [   ] other: Shoulder abduction 5/5, Elbow flexion 5/5, Elbow extension 5/5, Wrist Extension 5/5, Finger abduction 5/5,  4+/5     RIGHT LE: [   ] WNL,  [   ] other: Hip flexion 4-/5, knee extension 4-/5, plantarflexion 5/5, dorsiflexion 5/5  LEFT    LE: [   ] WNL,  [   ] other: Hip flexion 5/5, 5/5  knee extension/plantarflexion/dorsiflexion     Tone: [   x ] wnl,   [    ]  other:  DTRs: [x   ]symmetric, [   ] other:  Coordination:   [  x  ] intact,   [    ] other:                                                                           Sensory: [  x  ] Intact to light touch,   [    ] other:    MEDICATIONS  (STANDING):  amLODIPine   Tablet 5 milliGRAM(s) Oral daily  aspirin  chewable 81 milliGRAM(s) Oral daily  bacitracin   Ointment 1 Application(s) Topical every 24 hours  chlorhexidine 0.12% Liquid 15 milliLiter(s) Swish and Spit two times a day  chlorhexidine 2% Cloths 1 Application(s) Topical <User Schedule>  chlorhexidine 2% Cloths 1 Application(s) Topical <User Schedule>  clopidogrel Tablet 75 milliGRAM(s) Oral daily  enoxaparin Injectable 30 milliGRAM(s) SubCutaneous every 12 hours  folic acid 1 milliGRAM(s) Oral daily  hydrochlorothiazide 25 milliGRAM(s) Oral daily  losartan 25 milliGRAM(s) Oral daily  melatonin 5 milliGRAM(s) Oral <User Schedule>  metoprolol tartrate 12.5 milliGRAM(s) Oral every 12 hours  multivitamin 1 Tablet(s) Oral daily  polyethylene glycol 3350 17 Gram(s) Oral daily  senna 2 Tablet(s) Oral at bedtime  simvastatin 10 milliGRAM(s) Oral at bedtime  tamsulosin 0.4 milliGRAM(s) Oral at bedtime  thiamine 100 milliGRAM(s) Oral daily  traZODone 50 milliGRAM(s) Oral every 24 hours    MEDICATIONS  (PRN):  aluminum hydroxide/magnesium hydroxide/simethicone Suspension 30 milliLiter(s) Oral every 6 hours PRN Dyspepsia  magnesium hydroxide Suspension 30 milliLiter(s) Oral daily PRN Constipation  oxyCODONE    Solution 5 milliGRAM(s) Oral every 4 hours PRN Severe Pain (7 - 10)      RECENT LABS/IMAGING                        9.1    8.46  )-----------( 376      ( 12 Aug 2024 07:01 )             28.2     08-12    137  |  101  |  17  ----------------------------<  103<H>  4.5   |  25  |  0.8    Ca    8.4      12 Aug 2024 07:01  Mg     2.2     08-12    TPro  6.1  /  Alb  3.4<L>  /  TBili  0.5  /  DBili  x   /  AST  21  /  ALT  36  /  AlkPhos  85  08-12

## 2024-08-18 NOTE — PROGRESS NOTE ADULT - ASSESSMENT
ASSESSMENT/PLAN  79 y.o. M with PMH of CAD w/ prior MI s/p CABG (2000), HTN, HLD, MVR, TEO noncompliant with CPAP, recent diagnosis of prostate CA presented on 7/26/2024 to SICU after sustaining injuries from a mechanical fall (+HT, -LOC, -AC).  CT panscan revealed extensive b/l facial Type III [place] fractures (bilateral facial fx traversing bilateral orbits, nasal bones, nasal septum, maxillary sinus, pterygoid plates and left zygomatic arch) and soft tissue swelling. OMFS consulted. Patient underwent (8/1) ORIF of multiple facial fractures, bilateral internal maxillary arteries embolization by neuroendovascular.    Rehab for traumatic head trauma sustaining multiple Facial Bone Fractures, [place] Type III, epistaxis S/P Bilateral Internal Maxillary Arteries embolization and ORIF of Facial Bone Fracture, [place] Type III 2/2 mechanical fall      #Traumatic mechanical fall   #Head Trauma   #Multiple Facial Bone Fracture, [place] Type III.   #Epistaxis (resolved)   #S/P Bilateral Internal Maxillary Arteries embolization    #S/P ORIF of Facial Bone Fracture, [place] Type III   #S/P tracheostomy   #Gait abnormality  CT panscan revealed extensive b/l fascial Type III [place] fractures (bilateral facial fx traversing bilateral orbits, nasal bones, nasal septum, maxillary sinus, pterygoid plates and left zygomatic arch) and soft tissue swelling.    OMFS consulted. Patient underwent (8/1) ORIF of multiple fascial fractures.    ENT consulted for ongoing nasal bleeding s/p rhino rocket. Continues to bleed. Underwent bilateral internal maxillary arteries embolization by neuroendovascular. f/u Dr. Marcum outpatient    Ophthalmology consulted/ for  b/l orbital fractures, no intervention Tracheostomy for airway protection, decannulation 8/8/2024  - Unasyn 3g q6h stopped 8/12 as per OMFS  - Pain control: Tylenol, oxycodone 5 mg q4hr PRN (avoid NSAID)    - Precaution: fall, sinus precautions (open mouth sneezing, no nose blowing), Head of bed elevated @ 30 degrees   - encourage incentive spirometry   - staples removed by OMFS 8/12; patient cleared to shower; there was one remaining staple which was removed by rehab resident on 8/14  - Monitor for acute bleeding      #L optic neuropathy    - Ophthalmology consulted, believed to be chronic, no acute intervention.   - Recommend outpatient f/u for MRI brain/orbits, heavy metals, B12, folate, syphilis, SARAI, ACE, antiphospholipid antibx     #leukocytosis (resolved)  - WBC downtrending, low grade temp on 8/9might 2/2 recent steroid use and Percedex  - WBC wnl, afebrile for 48 hours  - Unasyn stopped 8/12 as per OMFS  - monitor vitals     #L ICA occlusion, R ICA stenosis    - 7/26 CTA neck showed Occlusion of L ICA at the L carotid bulb; Occlusion of the distal cervical portion of the left ICA; Severe senosis at the right carotid bulb.  No evidence of vascular injury, dissection, or extravasation.   - Echo (10/2023): EF 63%, mildly-enlarged LA, no stress-induced wall motion abnormalities, moderate MR, TR, and AR, sclerotic as per OMFS aortic valve, mild CO, borderline pulmonary HTN, mild aortic root calcification   - Vascular consulted. Recommend outpatient F/U w/ Dr. Quigley for surgical planning for possible Right carotid endarterectomy     #CAD w/ prior MI s/p CABG (2000)   #MVR  #HTN   #HLD   - c/w ASA + Plavix    - Home med: Metoprolol 50 mg qd, losartan 100 mg QD, amlodipine 5 mg QD, HCTZ 25 mg QD, Ezetimibe-Simvastatin 10-10   - While inpt: continue metoprolol 12.5 mg bid, losartan 25 mg daily, amlodipine 5 mg QD, HCTZ 25 mg QD   - Continue simvastatin 10 mg   - Monitor and adjust as needed      #acute blood loss anemia    - Hb (8/8/2024) 8.4   - received 1u PRBC  - transfuse if Hb < 7  - type and screen      #urinary retention (resolved)  - c/w Flomax 0.4 mg qhs  - monitor       #TEO    - Noncompliant with home CPAP   - Hold CPAP while facial injuries heal    -Pain control: Tylenol 650 mg q6h, Oxycodone 5 mg q4hr PRN     -GI/Bowel Mgmt: Miralax/senna    -Bladder management: voiding spontaneously      -Skin: fascial bruising, ecchymosis b/l orbit, L knee contusion    - Diet: Pureed diet  Precautions / PROPHYLAXIS:  Falls, sinus precautions (open mouth sneezing, no nose blowing), Head of bed elevated @ 30 degrees   - Ortho: Weight bearing status: no weight bearing restrictions     - DVT prophylaxis: Lovenox 30 mg q12h ASSESSMENT/PLAN  79 y.o. M with PMH of CAD w/ prior MI s/p CABG (2000), HTN, HLD, MVR, TEO noncompliant with CPAP, recent diagnosis of prostate CA presented on 7/26/2024 to SICU after sustaining injuries from a mechanical fall (+HT, -LOC, -AC).  CT panscan revealed extensive b/l facial Type III [place] fractures (bilateral facial fx traversing bilateral orbits, nasal bones, nasal septum, maxillary sinus, pterygoid plates and left zygomatic arch) and soft tissue swelling. OMFS consulted. Patient underwent (8/1) ORIF of multiple facial fractures, bilateral internal maxillary arteries embolization by neuroendovascular.    Rehab for traumatic head trauma sustaining multiple Facial Bone Fractures, [place] Type III, epistaxis S/P Bilateral Internal Maxillary Arteries embolization and ORIF of Facial Bone Fracture, [place] Type III 2/2 mechanical fall      #Traumatic mechanical fall   #Head Trauma   #Multiple Facial Bone Fracture, [place] Type III.   #Epistaxis (resolved)   #S/P Bilateral Internal Maxillary Arteries embolization    #S/P ORIF of Facial Bone Fracture, [place] Type III   #S/P tracheostomy   #Gait abnormality  CT panscan revealed extensive b/l fascial Type III [place] fractures (bilateral facial fx traversing bilateral orbits, nasal bones, nasal septum, maxillary sinus, pterygoid plates and left zygomatic arch) and soft tissue swelling.    OMFS consulted. Patient underwent (8/1) ORIF of multiple fascial fractures.    ENT consulted for ongoing nasal bleeding s/p rhino rocket. Continues to bleed. Underwent bilateral internal maxillary arteries embolization by neuroendovascular. f/u Dr. Marcum outpatient    Ophthalmology consulted/ for  b/l orbital fractures, no intervention Tracheostomy for airway protection, decannulation 8/8/2024  - Unasyn 3g q6h stopped 8/12 as per OMFS  - Pain control: Tylenol, oxycodone 5 mg q4hr PRN (avoid NSAID)    - Precaution: fall, sinus precautions (open mouth sneezing, no nose blowing), Head of bed elevated @ 30 degrees   - encourage incentive spirometry   - staples removed by OMFS 8/12; patient cleared to shower; there was one remaining staple which was removed by rehab resident on 8/14  - continue full rehab program    #L optic neuropathy    - Ophthalmology consulted, believed to be chronic, no acute intervention.   - Recommend outpatient f/u for MRI brain/orbits, heavy metals, B12, folate, syphilis, SARAI, ACE, antiphospholipid antibx     #leukocytosis (resolved)  - WBC downtrending, low grade temp on 8/9might 2/2 recent steroid use and Percedex  - WBC wnl, afebrile for 48 hours  - Unasyn stopped 8/12 as per OMFS  - monitor vitals     #L ICA occlusion, R ICA stenosis    - 7/26 CTA neck showed Occlusion of L ICA at the L carotid bulb; Occlusion of the distal cervical portion of the left ICA; Severe senosis at the right carotid bulb.  No evidence of vascular injury, dissection, or extravasation.   - Echo (10/2023): EF 63%, mildly-enlarged LA, no stress-induced wall motion abnormalities, moderate MR, TR, and AR, sclerotic as per OMFS aortic valve, mild MI, borderline pulmonary HTN, mild aortic root calcification   - Vascular consulted. Recommend outpatient F/U w/ Dr. Quigley for surgical planning for possible Right carotid endarterectomy     #CAD w/ prior MI s/p CABG (2000)   #MVR  #HTN   #HLD   - c/w ASA + Plavix    - Home med: Metoprolol 50 mg qd, losartan 100 mg QD, amlodipine 5 mg QD, HCTZ 25 mg QD, Ezetimibe-Simvastatin 10-10   - While inpt: continue metoprolol 12.5 mg bid, losartan 25 mg daily, amlodipine 5 mg QD, HCTZ 25 mg QD   - Continue simvastatin 10 mg   - Monitor and adjust as needed      #acute blood loss anemia    - Hb (8/8/2024) 8.4   - received 1u PRBC  - transfuse if Hb < 7  - improving    #urinary retention (resolved)  - c/w Flomax 0.4 mg qhs  - monitor       #TEO    - Noncompliant with home CPAP   - Hold CPAP while facial injuries heal    -Pain control: Tylenol 650 mg q6h, Oxycodone 5 mg q4hr PRN     -GI/Bowel Mgmt: Miralax/senna    -Bladder management: voiding spontaneously      -Skin: fascial bruising, ecchymosis b/l orbit, L knee contusion    - Diet: Pureed diet  Precautions / PROPHYLAXIS:  Falls, sinus precautions (open mouth sneezing, no nose blowing), Head of bed elevated @ 30 degrees   - Ortho: Weight bearing status: no weight bearing restrictions     - DVT prophylaxis: Lovenox 30 mg q12h

## 2024-08-19 ENCOUNTER — TRANSCRIPTION ENCOUNTER (OUTPATIENT)
Age: 79
End: 2024-08-19

## 2024-08-19 LAB
ALBUMIN SERPL ELPH-MCNC: 3.7 G/DL — SIGNIFICANT CHANGE UP (ref 3.5–5.2)
ALP SERPL-CCNC: 93 U/L — SIGNIFICANT CHANGE UP (ref 30–115)
ALT FLD-CCNC: 23 U/L — SIGNIFICANT CHANGE UP (ref 0–41)
ANION GAP SERPL CALC-SCNC: 10 MMOL/L — SIGNIFICANT CHANGE UP (ref 7–14)
AST SERPL-CCNC: 17 U/L — SIGNIFICANT CHANGE UP (ref 0–41)
BASOPHILS # BLD AUTO: 0.03 K/UL — SIGNIFICANT CHANGE UP (ref 0–0.2)
BASOPHILS NFR BLD AUTO: 0.5 % — SIGNIFICANT CHANGE UP (ref 0–1)
BILIRUB SERPL-MCNC: 0.3 MG/DL — SIGNIFICANT CHANGE UP (ref 0.2–1.2)
BUN SERPL-MCNC: 15 MG/DL — SIGNIFICANT CHANGE UP (ref 10–20)
CALCIUM SERPL-MCNC: 8.8 MG/DL — SIGNIFICANT CHANGE UP (ref 8.4–10.4)
CHLORIDE SERPL-SCNC: 95 MMOL/L — LOW (ref 98–110)
CO2 SERPL-SCNC: 30 MMOL/L — SIGNIFICANT CHANGE UP (ref 17–32)
CREAT SERPL-MCNC: 1 MG/DL — SIGNIFICANT CHANGE UP (ref 0.7–1.5)
EGFR: 77 ML/MIN/1.73M2 — SIGNIFICANT CHANGE UP
EOSINOPHIL # BLD AUTO: 0.19 K/UL — SIGNIFICANT CHANGE UP (ref 0–0.7)
EOSINOPHIL NFR BLD AUTO: 3.3 % — SIGNIFICANT CHANGE UP (ref 0–8)
GLUCOSE SERPL-MCNC: 122 MG/DL — HIGH (ref 70–99)
HCT VFR BLD CALC: 31.7 % — LOW (ref 42–52)
HGB BLD-MCNC: 9.8 G/DL — LOW (ref 14–18)
IMM GRANULOCYTES NFR BLD AUTO: 0.4 % — HIGH (ref 0.1–0.3)
LYMPHOCYTES # BLD AUTO: 1.28 K/UL — SIGNIFICANT CHANGE UP (ref 1.2–3.4)
LYMPHOCYTES # BLD AUTO: 22.5 % — SIGNIFICANT CHANGE UP (ref 20.5–51.1)
MAGNESIUM SERPL-MCNC: 2.4 MG/DL — SIGNIFICANT CHANGE UP (ref 1.8–2.4)
MCHC RBC-ENTMCNC: 29.7 PG — SIGNIFICANT CHANGE UP (ref 27–31)
MCHC RBC-ENTMCNC: 30.9 G/DL — LOW (ref 32–37)
MCV RBC AUTO: 96.1 FL — HIGH (ref 80–94)
MONOCYTES # BLD AUTO: 0.39 K/UL — SIGNIFICANT CHANGE UP (ref 0.1–0.6)
MONOCYTES NFR BLD AUTO: 6.9 % — SIGNIFICANT CHANGE UP (ref 1.7–9.3)
NEUTROPHILS # BLD AUTO: 3.77 K/UL — SIGNIFICANT CHANGE UP (ref 1.4–6.5)
NEUTROPHILS NFR BLD AUTO: 66.4 % — SIGNIFICANT CHANGE UP (ref 42.2–75.2)
NRBC # BLD: 0 /100 WBCS — SIGNIFICANT CHANGE UP (ref 0–0)
PLATELET # BLD AUTO: 369 K/UL — SIGNIFICANT CHANGE UP (ref 130–400)
PMV BLD: 9.4 FL — SIGNIFICANT CHANGE UP (ref 7.4–10.4)
POTASSIUM SERPL-MCNC: 4.7 MMOL/L — SIGNIFICANT CHANGE UP (ref 3.5–5)
POTASSIUM SERPL-SCNC: 4.7 MMOL/L — SIGNIFICANT CHANGE UP (ref 3.5–5)
PROT SERPL-MCNC: 6.1 G/DL — SIGNIFICANT CHANGE UP (ref 6–8)
RBC # BLD: 3.3 M/UL — LOW (ref 4.7–6.1)
RBC # FLD: 15.5 % — HIGH (ref 11.5–14.5)
SODIUM SERPL-SCNC: 135 MMOL/L — SIGNIFICANT CHANGE UP (ref 135–146)
TSH SERPL-MCNC: 5 UIU/ML — HIGH (ref 0.27–4.2)
WBC # BLD: 5.68 K/UL — SIGNIFICANT CHANGE UP (ref 4.8–10.8)
WBC # FLD AUTO: 5.68 K/UL — SIGNIFICANT CHANGE UP (ref 4.8–10.8)

## 2024-08-19 RX ORDER — TAMSULOSIN HYDROCHLORIDE 0.4 MG/1
1 CAPSULE ORAL
Qty: 30 | Refills: 0
Start: 2024-08-19 | End: 2024-09-17

## 2024-08-19 RX ORDER — SENNA 187 MG
2 TABLET ORAL
Qty: 0 | Refills: 0 | DISCHARGE
Start: 2024-08-19

## 2024-08-19 RX ORDER — FOLIC ACID 1 MG
1 TABLET ORAL
Qty: 0 | Refills: 0 | DISCHARGE
Start: 2024-08-19

## 2024-08-19 RX ORDER — TRAZODONE HCL 50 MG
1 TABLET ORAL
Qty: 30 | Refills: 0
Start: 2024-08-19 | End: 2024-09-17

## 2024-08-19 RX ORDER — METOPROLOL TARTRATE 100 MG/1
1 TABLET ORAL
Qty: 30 | Refills: 0
Start: 2024-08-19 | End: 2024-09-17

## 2024-08-19 RX ORDER — THIAMINE HCL 250 MG
1 TABLET ORAL
Qty: 0 | Refills: 0 | DISCHARGE
Start: 2024-08-19

## 2024-08-19 RX ADMIN — Medication 50 MILLIGRAM(S): at 21:15

## 2024-08-19 RX ADMIN — Medication 1 MILLIGRAM(S): at 12:33

## 2024-08-19 RX ADMIN — CHLORHEXIDINE GLUCONATE 1 APPLICATION(S): 40 SOLUTION TOPICAL at 05:48

## 2024-08-19 RX ADMIN — METOPROLOL TARTRATE 12.5 MILLIGRAM(S): 100 TABLET ORAL at 05:43

## 2024-08-19 RX ADMIN — Medication 81 MILLIGRAM(S): at 12:33

## 2024-08-19 RX ADMIN — Medication 1 APPLICATION(S): at 17:23

## 2024-08-19 RX ADMIN — TAMSULOSIN HYDROCHLORIDE 0.4 MILLIGRAM(S): 0.4 CAPSULE ORAL at 21:15

## 2024-08-19 RX ADMIN — Medication 10 MILLIGRAM(S): at 21:14

## 2024-08-19 RX ADMIN — AMLODIPINE BESYLATE 5 MILLIGRAM(S): 10 TABLET ORAL at 05:49

## 2024-08-19 RX ADMIN — ENOXAPARIN SODIUM 30 MILLIGRAM(S): 100 INJECTION SUBCUTANEOUS at 17:19

## 2024-08-19 RX ADMIN — CHLORHEXIDINE GLUCONATE 15 MILLILITER(S): 40 SOLUTION TOPICAL at 05:43

## 2024-08-19 RX ADMIN — Medication 1 TABLET(S): at 12:32

## 2024-08-19 RX ADMIN — ENOXAPARIN SODIUM 30 MILLIGRAM(S): 100 INJECTION SUBCUTANEOUS at 05:43

## 2024-08-19 RX ADMIN — CHLORHEXIDINE GLUCONATE 15 MILLILITER(S): 40 SOLUTION TOPICAL at 17:19

## 2024-08-19 RX ADMIN — Medication 100 MILLIGRAM(S): at 12:32

## 2024-08-19 RX ADMIN — LOSARTAN POTASSIUM 25 MILLIGRAM(S): 50 TABLET ORAL at 05:44

## 2024-08-19 RX ADMIN — POLYETHYLENE GLYCOL 3350 17 GRAM(S): 17 POWDER, FOR SOLUTION ORAL at 12:37

## 2024-08-19 RX ADMIN — Medication 75 MILLIGRAM(S): at 12:33

## 2024-08-19 RX ADMIN — METOPROLOL TARTRATE 12.5 MILLIGRAM(S): 100 TABLET ORAL at 17:19

## 2024-08-19 NOTE — PROGRESS NOTE ADULT - ASSESSMENT
ASSESSMENT/PLAN  79 y.o. M with PMH of CAD w/ prior MI s/p CABG (2000), HTN, HLD, MVR, TEO noncompliant with CPAP, recent diagnosis of prostate CA presented on 7/26/2024 to SICU after sustaining injuries from a mechanical fall (+HT, -LOC, -AC).  CT panscan revealed extensive b/l facial Type III [place] fractures (bilateral facial fx traversing bilateral orbits, nasal bones, nasal septum, maxillary sinus, pterygoid plates and left zygomatic arch) and soft tissue swelling. OMFS consulted. Patient underwent (8/1) ORIF of multiple facial fractures, bilateral internal maxillary arteries embolization by neuroendovascular.    Rehab for traumatic head trauma sustaining multiple Facial Bone Fractures, [place] Type III, epistaxis S/P Bilateral Internal Maxillary Arteries embolization and ORIF of Facial Bone Fracture, [place] Type III 2/2 mechanical fall      #Traumatic mechanical fall   #Head Trauma   #Multiple Facial Bone Fracture, [place] Type III.   #Epistaxis (resolved)   #S/P Bilateral Internal Maxillary Arteries embolization    #S/P ORIF of Facial Bone Fracture, [place] Type III   #S/P tracheostomy   #Gait abnormality  CT panscan revealed extensive b/l fascial Type III [place] fractures (bilateral facial fx traversing bilateral orbits, nasal bones, nasal septum, maxillary sinus, pterygoid plates and left zygomatic arch) and soft tissue swelling.    OMFS consulted. Patient underwent (8/1) ORIF of multiple fascial fractures.    ENT consulted for ongoing nasal bleeding s/p rhino rocket. Continues to bleed. Underwent bilateral internal maxillary arteries embolization by neuroendovascular. f/u Dr. Marcum outpatient    Ophthalmology consulted/ for  b/l orbital fractures, no intervention Tracheostomy for airway protection, decannulation 8/8/2024  - Unasyn 3g q6h stopped 8/12 as per OMFS  - Pain control: Tylenol, oxycodone 5 mg q4hr PRN (avoid NSAID)    - Precaution: fall, sinus precautions (open mouth sneezing, no nose blowing), Head of bed elevated @ 30 degrees   - encourage incentive spirometry   - staples removed by OMFS 8/12; patient cleared to shower; there was one remaining staple which was removed by rehab resident on 8/14  - continue full rehab program    #L optic neuropathy    - Ophthalmology consulted, believed to be chronic, no acute intervention.   - Recommend outpatient f/u for MRI brain/orbits, heavy metals, B12, folate, syphilis, SARAI, ACE, antiphospholipid antibx     #leukocytosis (resolved)  - WBC downtrending, low grade temp on 8/9might 2/2 recent steroid use and Percedex  - WBC wnl, afebrile for 48 hours  - Unasyn stopped 8/12 as per OMFS  - monitor vitals     #L ICA occlusion, R ICA stenosis    - 7/26 CTA neck showed Occlusion of L ICA at the L carotid bulb; Occlusion of the distal cervical portion of the left ICA; Severe senosis at the right carotid bulb.  No evidence of vascular injury, dissection, or extravasation.   - Echo (10/2023): EF 63%, mildly-enlarged LA, no stress-induced wall motion abnormalities, moderate MR, TR, and AR, sclerotic as per OMFS aortic valve, mild ND, borderline pulmonary HTN, mild aortic root calcification   - Vascular consulted. Recommend outpatient F/U w/ Dr. Quigley for surgical planning for possible Right carotid endarterectomy     #CAD w/ prior MI s/p CABG (2000)   #MVR  #HTN   #HLD   - c/w ASA + Plavix    - Home med: Metoprolol 50 mg qd, losartan 100 mg QD, amlodipine 5 mg QD, HCTZ 25 mg QD, Ezetimibe-Simvastatin 10-10   - While inpt: continue metoprolol 12.5 mg bid, losartan 25 mg daily, amlodipine 5 mg QD, HCTZ 25 mg QD   - Continue simvastatin 10 mg   - Monitor and adjust as needed      #acute blood loss anemia    - Hb (8/8/2024) 8.4   - received 1u PRBC  - transfuse if Hb < 7  - improving    #urinary retention (resolved)  - c/w Flomax 0.4 mg qhs  - monitor       #TEO    - Noncompliant with home CPAP   - Hold CPAP while facial injuries heal    -Pain control: Tylenol 650 mg q6h, Oxycodone 5 mg q4hr PRN   -GI/Bowel Mgmt: Miralax/senna  -Bladder management: voiding spontaneously   -Skin: fascial bruising, ecchymosis b/l orbit, L knee contusion    - Diet: Pureed diet  Precautions / PROPHYLAXIS:  Falls, sinus precautions (open mouth sneezing, no nose blowing), Head of bed elevated @ 30 degrees   - Ortho: Weight bearing status: no weight bearing restrictions     - DVT prophylaxis: Lovenox 30 mg q12h ASSESSMENT/PLAN  79 y.o. M with PMH of CAD w/ prior MI s/p CABG (2000), HTN, HLD, MVR, TEO noncompliant with CPAP, recent diagnosis of prostate CA presented on 7/26/2024 to SICU after sustaining injuries from a mechanical fall (+HT, -LOC, -AC).  CT panscan revealed extensive b/l facial Type III [place] fractures (bilateral facial fx traversing bilateral orbits, nasal bones, nasal septum, maxillary sinus, pterygoid plates and left zygomatic arch) and soft tissue swelling. OMFS consulted. Patient underwent (8/1) ORIF of multiple facial fractures, bilateral internal maxillary arteries embolization by neuroendovascular.    Rehab for traumatic head trauma sustaining multiple Facial Bone Fractures, [place] Type III, epistaxis S/P Bilateral Internal Maxillary Arteries embolization and ORIF of Facial Bone Fracture, [place] Type III 2/2 mechanical fall      #Traumatic mechanical fall   #Head Trauma   #Multiple Facial Bone Fracture, [place] Type III.   #Epistaxis (resolved)   #S/P Bilateral Internal Maxillary Arteries embolization    #S/P ORIF of Facial Bone Fracture, [place] Type III   #S/P tracheostomy   #Gait abnormality  CT panscan revealed extensive b/l fascial Type III [place] fractures (bilateral facial fx traversing bilateral orbits, nasal bones, nasal septum, maxillary sinus, pterygoid plates and left zygomatic arch) and soft tissue swelling.    OMFS consulted. Patient underwent (8/1) ORIF of multiple fascial fractures.    ENT consulted for ongoing nasal bleeding s/p rhino rocket. Continues to bleed. Underwent bilateral internal maxillary arteries embolization by neuroendovascular. f/u Dr. Marcum outpatient    Ophthalmology consulted/ for  b/l orbital fractures, no intervention Tracheostomy for airway protection, decannulation 8/8/2024  - Unasyn 3g q6h stopped 8/12 as per OMFS  - Pain control: Tylenol, oxycodone 5 mg q4hr PRN (avoid NSAID)    - Precaution: fall, sinus precautions (open mouth sneezing, no nose blowing), Head of bed elevated @ 30 degrees   - encourage incentive spirometry   - staples removed by OMFS 8/12; patient cleared to shower; there was one remaining staple which was removed by rehab resident on 8/14  - continue full rehab program      #Left optic neuropathy    - Ophthalmology consulted; no acute intervention was required. He had some left eye chronic impairment of vision. The patient feels there has been some further worsening after the facial trauma.     - MRI brain/orbits is required as an out-patient. Follow up of blood test results including heavy metals, B12, folate, syphilis, SARAI, ACE, antiphospholipid antibodies as an outpatient is required.  - Follow up with Dr. Lopez/Opthomology        #leukocytosis (resolved)  - WBC downtrending, low grade temp on 8/9might 2/2 recent steroid use and Percedex.  - WBC wnl, afebrile for 48 hours  - Unasyn stopped 8/12 as per OMFS  - monitor vitals     #L ICA occlusion, R ICA stenosis    - 7/26 CTA neck showed Occlusion of L ICA at the L carotid bulb; Occlusion of the distal cervical portion of the left ICA; Severe senosis at the right carotid bulb.  No evidence of vascular injury, dissection, or extravasation.   - Echo (10/2023): EF 63%, mildly-enlarged LA, no stress-induced wall motion abnormalities, moderate MR, TR, and AR, sclerotic as per OMFS aortic valve, mild TN, borderline pulmonary HTN, mild aortic root calcification   - Vascular consulted. Recommend outpatient follow w/ Dr. Quigley/Vascular  for surgical planning for possible Right carotid endarterectomy     #CAD w/ prior MI s/p CABG (2000)   #MVR  #HTN   #HLD   - c/w ASA + Plavix    - Home med: Metoprolol 50 mg qd, losartan 100 mg QD, amlodipine 5 mg QD, HCTZ 25 mg QD, Ezetimibe-Simvastatin 10-10   - While inpt: continue metoprolol 12.5 mg bid, losartan 25 mg daily, amlodipine 5 mg QD, HCTZ 25 mg QD   - Continue simvastatin 10 mg   - Monitor and adjust as needed      #acute blood loss anemia    - Hb (8/8/2024) 8.4   - received 1u PRBC  - transfuse if Hb < 7  - improving    #urinary retention (resolved)  - c/w Flomax 0.4 mg qhs  - monitor       #TEO    - Noncompliant with home CPAP   - Hold CPAP while facial injuries heal    -Pain control: Tylenol 650 mg q6h, Oxycodone 5 mg q4hr PRN   -GI/Bowel Mgmt: Miralax/senna  -Bladder management: voiding spontaneously   -Skin: fascial bruising, ecchymosis b/l orbit, L knee contusion    - Diet: Pureed diet  Precautions / PROPHYLAXIS:  Falls, sinus precautions (open mouth sneezing, no nose blowing), Head of bed elevated @ 30 degrees   - Ortho: Weight bearing status: no weight bearing restrictions     - DVT prophylaxis: Lovenox 30 mg q12h

## 2024-08-19 NOTE — DISCHARGE NOTE PROVIDER - REASON FOR ADMISSION
Rehab for traumatic head trauma sustaining multiple Facial Bone Fracture, [place] Type III, epistaxis S/P Bilateral Internal Maxillary Arteries embolization and ORIF of Facial Bone Fracture, [place] Type III 2/2 mechanical fall

## 2024-08-19 NOTE — DISCHARGE NOTE PROVIDER - NSDCCPCAREPLAN_GEN_ALL_CORE_FT
PRINCIPAL DISCHARGE DIAGNOSIS  Diagnosis: Head trauma  Assessment and Plan of Treatment: CT scan showing extensive facial fractures.   OMFS consulted, you underwent open reduction and internal fixation of multiple facial fractures.  Staples were removed on 8/12.   ENT was consulted for nasal bleeding. You had an embolization by neuroendovascular.  You will need to follow up oupatient with ENT.   You will need to follow up outpatient with Opthamlology for bilateral orbital fractures.      SECONDARY DISCHARGE DIAGNOSES  Diagnosis: Left optic neuropathy  Assessment and Plan of Treatment: - Ophthalmology consulted, believed to be chronic, no acute intervention.   - Recommend outpatient follow up for MRI brain/orbits, heavy metals, B12, folate, syphilis, SARAI, ACE, antiphospholipid antibx.    Diagnosis: ICAO (internal carotid artery occlusion)  Assessment and Plan of Treatment: CTA of the neck showed occlusion of the left Internal Carotid Arter and stenosis or narrowing of the right Internal Carotid Artery.   You were seen by the vascular service and they recommend outpatient follow up with Dr. Quigley for surgical planning for possible right carotid endarterectomy.    Diagnosis: Coronary artery disease  Assessment and Plan of Treatment: with prior MI, s/p CABG in 2000  - c/w ASA + Plavix    Diagnosis: HLD (hyperlipidemia)  Assessment and Plan of Treatment: Continue simvastatin 10 mg    Diagnosis: HTN (hypertension)  Assessment and Plan of Treatment: Your blood pressures were very well controlled in the hospital.   Your home medication losartan was lowered while in the hospital from 50mg to 25mg. Continue 25mg on discharge and follow up with your PCP regarding increasing dose if blood pressures become elevated.   Continue metoprolol 12.5 mg twice a day, losartan 25 mg daily, amlodipine 5 mg daily, HCTZ 25 mg daily     PRINCIPAL DISCHARGE DIAGNOSIS  Diagnosis: Head trauma  Assessment and Plan of Treatment: CT scan showing extensive facial fractures.   OMFS consulted, you underwent open reduction and internal fixation of multiple facial fractures.  Staples were removed on 8/12.   ENT was consulted for nasal bleeding. You had an embolization by neuroendovascular.  - Must keep intra-oral elastic rubber bands in place, to guide into proper occlusion  - Use the Peridex mouthwash twice a day  - Liquid/ Puree diet for a total of 6 weeks (until 9/12/24)  -You will need to follow up oupatient with ENT.   -You will need to follow up outpatient with Opthamlology for bilateral orbital fractures.        SECONDARY DISCHARGE DIAGNOSES  Diagnosis: Left optic neuropathy  Assessment and Plan of Treatment: - Ophthalmology consulted, believed to be chronic, no acute intervention.   - Recommend outpatient follow up for MRI brain/orbits, heavy metals, B12, folate, syphilis, SARAI, ACE, antiphospholipid antibx.    Diagnosis: ICAO (internal carotid artery occlusion)  Assessment and Plan of Treatment: CTA of the neck showed occlusion of the left Internal Carotid Arter and stenosis or narrowing of the right Internal Carotid Artery.   You were seen by the vascular service and they recommend outpatient follow up with Dr. Quigley for surgical planning for possible right carotid endarterectomy.    Diagnosis: Coronary artery disease  Assessment and Plan of Treatment: with prior MI, s/p CABG in 2000  - c/w ASA + Plavix    Diagnosis: HLD (hyperlipidemia)  Assessment and Plan of Treatment: Continue simvastatin 10 mg    Diagnosis: HTN (hypertension)  Assessment and Plan of Treatment: Your blood pressures were very well controlled in the hospital.   Your home medication losartan was lowered while in the hospital from 50mg to 25mg. Continue 25mg on discharge and follow up with your PCP regarding increasing dose if blood pressures become elevated.   Continue metoprolol 12.5 mg twice a day, losartan 25 mg daily, amlodipine 5 mg daily, HCTZ 25 mg daily

## 2024-08-19 NOTE — DISCHARGE NOTE PROVIDER - NSDCACTIVITY_GEN_ALL_CORE
Do not drive or operate machinery/No heavy lifting/straining/Follow Instructions Provided by your Surgical Team/Activity as tolerated

## 2024-08-19 NOTE — PROGRESS NOTE ADULT - SUBJECTIVE AND OBJECTIVE BOX
Patient is a 79y old  Male who presents with a chief complaint of Rehab for traumatic head trauma sustaining multiple Facial Bone Fracture, [place] Type III, epistaxis S/P Bilateral Internal Maxillary Arteries embolization and ORIF of Facial Bone Fracture, [place] Type III 2/2 mechanical fall (09 Aug 2024 14:51)      HPI:  August 11, 2024 admission    79 y.o. M Frisian speaking  with PMH of CAD w/ prior MI s/p CABG (2000), HTN, HLD, MVR, TEO noncompliant with CPAP, recent diagnosis of prostate CA presented on 7/26/2024 to SICU after sustaining injuries from a mechanical fall (+HT, -LOC, -AC). Patient was walking out of his house to see his Cardiologist (Dr. Basurto) when he tripped down his front 2 steps and landed forward on his face onto the concrete.  CT panscan revealed extensive b/l fascial Type III [place] fractures (bilateral facial fx traversing bilateral orbits, nasal bones, nasal septum, maxillary sinus, pterygoid plates and left zygomatic arch) and soft tissue swelling. OMFS consulted. Patient underwent (8/1) ORIF of multiple fascial fractures. ENT consulted for ongoing nasal bleeding s/p rhino rocket. Continues to bleed. Underwent bilateral internal maxillary arteries embolization by neuroendovascular. 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       During patient's hospital course, he developed delirium and violent behavior, code grey called and received Zyprexa and Precedex. Also transfuses 1u PRBC 2/2 acute blood loss anemia. Additionally, patient required tracheostomy (8/1) for airway protection, decannulation 8/8/2024. Labs significant for leukocytosis that continues to downtrend, no active infection identified.  On 8/9/2024, just prior to discharge to acute rehabilitation patient spike a fever of 100.8; therefore admission was postponed. Repeat vitals have been stable, labs reviewed. Patient has not spike a fever in the past 48 hours and is medically stable to admit to acute rehabilitation on 8/11/2024    During their stay, the patient was evaluated by physical and occupational therapy. Prior to admission, patient was independent with ambulation and ADLs. The most recent evaluated functional status is Kevin for bed mobility, modA for transfers, modA for ambulation x30 feet with RW, set up for upper body dressing, and contact guard for lower body dressing. Patient was deemed a good acute rehab candidate due to decline in functional status and ability to carry out activities of daily living. Patient is able to tolerate 3 hours of therapy 5-6 days a week and is motivated to participate.     LS: Lives with his wife in a private house with 5 GERAL[address]INE and 14 flight of stairs to the 2nd floor bedroom.    PLOF: Independent with ADLs and IADLs. Ambulated independently without any AD.        TODAY'S SUBJECTIVE & REVIEW OF SYMPTOMS  Patient was seen and assessed at bedside.   No overnight events.   Patient denies any new complaints at this time.   Tolerating PT/OT.   Tolerating oral diet.   Voiding and passing stool spontaneously.   Vital signs reviewed.  Anticipated discharge home tomorrow.   AM labs pending.     CLOF: 100' RW supervision, Transfers SA, UBD set up. LBD PA    Constitutional    [ x  ] WNL           [   ] poor appetite   [   ] insomnia   [   ] tired   Cardio:                [x   ] WNL           [   ] CP   [   ] MCDANIEL   [   ] palpitations               Resp:                   [ x  ] WNL           [   ] SOB   [   ] cough   [   ] wheezing   GI:                        [ x  ] WNL           [   ] constipation   [   ] diarrhea   [   ] abdominal pain   [   ] nausea   [   ] emesis                                :                      [  x ] WNL           [   ] SOLORZANO  [   ] dysuria   [   ] difficulty voiding             Endo:                   [  x ] WNL          [   ] polyuria   [   ] temperature intolerance                 Skin:                     [   ] WNL          [   ] pain   [ x  ] wound   [   ] rash   MSK:                    [x   ] WNL          [   ] muscle pain   [   ] joint pain/ stiffness   [   ] muscle tenderness   [   ] swelling   Neuro:                 [   ] WNL          [   ] HA   [   x] change in vision   [x   ] tremor: improving   [ x  ] weakness   [   ]dysphagia              Cognitive:           [ x  ] WNL           [   ]confusion      Psych:                  [ x  ] WNL           [   ] hallucinations   [   ]agitation   [   ] delusion   [   ]depression    PHYSICAL EXAM  T(C): 36.8 (08-19-24 @ 06:08), Max: 36.8 (08-18-24 @ 19:48)  T(F): 98.2 (08-19-24 @ 06:08), Max: 98.3 (08-18-24 @ 19:48)  HR: 65 (08-19-24 @ 06:08) (65 - 79)  BP: 127/66 (08-19-24 @ 06:08) (113/73 - 143/69)  ABP: --  ABP(mean): --  RR: 18 (08-19-24 @ 06:08) (16 - 18)  SpO2: --    General:[ x  ] NAD, Resting Comfortable,   [   ] other:                                HEENT: [  x ] NC/AT, EOMI, PERRL , [] Normal Conjunctivae,   [   ] other: decreased L with L subconjuctival hemorrhage   Cardio: [  x ] RRR, [x] grade II/VI systolic ejection murmur,   [   ] other:                           Pulm: [ x  ] No Respiratory Distress,  Lungs CTAB,   [   ] other:                       Abdomen: [ x  ]ND/NT, Soft,   [   ] other:    : [ x  ] NO SOLORZANO CATHETER, [   ] SOLORZANO CATHETER- no meatal tear, no discharge, [   ] other:                          MSK: [ x  ] No joint swelling, Full ROM, appropriate passive and active ROM,   [   ] other:  diffuse resting tremors  Ext: [  x ]No C/C/E, No calf tenderness,   [   ]other:    Skin: [   ]intact,   [  x ] other:  fascial bruising, b/l orbital ecchymosis, L knee contusion                                                                Neurological Examination:  Cognitive: [    ] AAO x 2 not orientated to time,   [    ]  other:                                                                      Attention:  [ x   ] intact,   [    ]  other:  able to do serial 7s                          Memory: [  x  ] intact,    [    ]  other:     Mood/Affect: [ x   ] wnl,    [    ]  other:                                                                             Communication: [  x  ]Fluent, no dysarthria, following commands:  [    ] other:   CN II - XII:  [ x   ] intact except for decreased L vision,  [    ] other:                                                                                        Motor:   RIGHT UE: [   ] WNL,  [   ] other: Shoulder abduction 5/5, Elbow flexion 5/5, Elbow extension 5/5, Wrist Extension 5/5, Finger abduction 5/5,  4+/5   LEFT    UE: [   ] WNL,  [   ] other: Shoulder abduction 5/5, Elbow flexion 5/5, Elbow extension 5/5, Wrist Extension 5/5, Finger abduction 5/5,  4+/5     RIGHT LE: [   ] WNL,  [   ] other: Hip flexion 4-/5, knee extension 4-/5, plantarflexion 5/5, dorsiflexion 5/5  LEFT    LE: [   ] WNL,  [   ] other: Hip flexion 5/5, 5/5  knee extension/plantarflexion/dorsiflexion     Tone: [   x ] wnl,   [    ]  other:  DTRs: [x   ]symmetric, [   ] other:  Coordination:   [  x  ] intact,   [    ] other:                                                                           Sensory: [  x  ] Intact to light touch,   [    ] other:    MEDICATIONS  (STANDING):  amLODIPine   Tablet 5 milliGRAM(s) Oral daily  aspirin  chewable 81 milliGRAM(s) Oral daily  bacitracin   Ointment 1 Application(s) Topical every 24 hours  chlorhexidine 0.12% Liquid 15 milliLiter(s) Swish and Spit two times a day  chlorhexidine 2% Cloths 1 Application(s) Topical <User Schedule>  chlorhexidine 2% Cloths 1 Application(s) Topical <User Schedule>  clopidogrel Tablet 75 milliGRAM(s) Oral daily  enoxaparin Injectable 30 milliGRAM(s) SubCutaneous every 12 hours  folic acid 1 milliGRAM(s) Oral daily  hydrochlorothiazide 25 milliGRAM(s) Oral daily  losartan 25 milliGRAM(s) Oral daily  metoprolol tartrate 12.5 milliGRAM(s) Oral every 12 hours  multivitamin 1 Tablet(s) Oral daily  polyethylene glycol 3350 17 Gram(s) Oral daily  senna 2 Tablet(s) Oral at bedtime  simvastatin 10 milliGRAM(s) Oral at bedtime  tamsulosin 0.4 milliGRAM(s) Oral at bedtime  thiamine 100 milliGRAM(s) Oral daily  traZODone 50 milliGRAM(s) Oral every 24 hours    MEDICATIONS  (PRN):  aluminum hydroxide/magnesium hydroxide/simethicone Suspension 30 milliLiter(s) Oral every 6 hours PRN Dyspepsia  magnesium hydroxide Suspension 30 milliLiter(s) Oral daily PRN Constipation  oxyCODONE    Solution 5 milliGRAM(s) Oral every 4 hours PRN Severe Pain (7 - 10)        RECENT LABS/IMAGING                        9.1    8.46  )-----------( 376      ( 12 Aug 2024 07:01 )             28.2     08-12    137  |  101  |  17  ----------------------------<  103<H>  4.5   |  25  |  0.8    Ca    8.4      12 Aug 2024 07:01  Mg     2.2     08-12    TPro  6.1  /  Alb  3.4<L>  /  TBili  0.5  /  DBili  x   /  AST  21  /  ALT  36  /  AlkPhos  85  08-12

## 2024-08-19 NOTE — DISCHARGE NOTE PROVIDER - NPI NUMBER (FOR SYSADMIN USE ONLY) :
[[id number]],[[id number]],[[id number]],[[id number]],[[id number]],[[id number]] [[id number]],[[id number]],[[id number]],[[id number]],[[id number]],[[id number]],[[id number]]

## 2024-08-19 NOTE — DISCHARGE NOTE PROVIDER - CARE PROVIDER_API CALL
Lalo Linn  Surgery  [address] [place] [address]  Phone: [phone]  Fax: [phone]  Follow Up Time: 2 weeks    Moises Wharton  Oral/Maxillofacial Surgery  [address] [place] [address]  Phone: [phone]  Fax: [phone]  Follow Up Time:     Alan Lopez  Optometrist  [address] [place] [address]  Phone: [phone]  Fax: [phone]  Follow Up Time:     Tanmay Marcum  Head/Neck Surgery  [address] [place] [address]  Phone: [phone]  Fax: [phone]  Follow Up Time:     Vazquez Jansen.  Internal Medicine  [address] [facility]  [place] [address]  Phone: [phone]  Fax: [phone]  Follow Up Time:     Tim Quigley  Vascular Surgery  [address], [address]  [place] [address]  Phone: [phone]  Fax: [phone]  Follow Up Time:    Lalo Linn  Surgery  [address] [place] [address]  Phone: [phone]  Fax: [phone]  Follow Up Time: 2 weeks    Moises Wharton  Oral/Maxillofacial Surgery  [address] [place] [address]  Phone: [phone]  Fax: [phone]  Follow Up Time:     Alan Lopez  Optometrist  [address] [place] [address]  Phone: [phone]  Fax: [phone]  Follow Up Time:     Tanmay Marcum  Head/Neck Surgery  [address] [place] [address]  Phone: [phone]  Fax: [phone]  Follow Up Time:     Vazquez Jansen.  Internal Medicine  [address] [facility] [address]  [place] [address]  Phone: [phone]  Fax: [phone]  Follow Up Time:     Tim Quigley  Vascular Surgery  [address] [facility] [address], [address]  [place] [address]  Phone: [phone]  Fax: [phone]  Follow Up Time:     Ernst Townsend  Neurosurgery  [address], [address]  [place] [address]  Phone: [phone]  Fax: [phone]  Established Patient  Follow Up Time:

## 2024-08-19 NOTE — DISCHARGE NOTE PROVIDER - CARE PROVIDERS DIRECT ADDRESSES
,[email or web address]_Unknown,[email or web address] ,[email or web address],DirectAddress_Unknown,[email or web address]

## 2024-08-19 NOTE — DISCHARGE NOTE PROVIDER - INSTRUCTIONS
Ensure 1 can three times a day with meals.  Liquid/ Puree diet for a total of 6 weeks (until 9/12/24)  Ensure 1 can three times a day with meals.

## 2024-08-19 NOTE — DISCHARGE NOTE PROVIDER - HOSPITAL COURSE
79 y.o. M Maltese speaking with PMH of CAD w/ prior MI s/p CABG (2000), HTN, HLD, MVR, TEO noncompliant with CPAP, recent diagnosis of prostate CA presented on 7/26/2024 to SICU after sustaining injuries from a mechanical fall (+HT, -LOC, -AC). Patient was walking out of his house to see his Cardiologist (Dr. Basurto) when he tripped down his front 2 steps and landed forward on his face onto the concrete.  CT panscan revealed extensive b/l fascial Type III [place] fractures (bilateral facial fx traversing bilateral orbits, nasal bones, nasal septum, maxillary sinus, pterygoid plates and left zygomatic arch) and soft tissue swelling. OMFS consulted. Patient underwent (8/1) ORIF of multiple fascial fractures. ENT consulted for ongoing nasal bleeding s/p rhino rocket. Continued to bleed. Underwent bilateral internal maxillary arteries embolization by neuroendovascular. 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       During patient's hospital course, he developed delirium and violent behavior, code grey called and received Zyprexa and Precedex. Also transfuses 1u PRBC 2/2 acute blood loss anemia. Additionally, patient required tracheostomy (8/1) for airway protection, decannulation 8/8/2024. Labs significant for leukocytosis that continues to downtrend, no active infection identified.  On 8/9/2024, just prior to discharge to acute rehabilitation patient spike a fever of 100.8; therefore admission was postponed. Repeat vitals have been stable, labs reviewed. Patient has not spike a fever in the past 48 hours and was admitted to acute rehabilitation on 8/11/2024. During acute rehabilitation stay, patient participated in 3 hours of therapy (PT/OT/ST). He received Unasyn [id number] until 8/12 as per OMFS. His staples were removed by OMFS on 8/12; patient cleared to shower; there was one remaining staple which was removed by rehab resident on 8/14. Rest of his hospital course during acute rehabilitation was uncomplicated. He is being discharged home on 8/20.    A/P:  Rehab for traumatic head trauma sustaining multiple Facial Bone Fractures, [place] Type III, epistaxis S/P Bilateral Internal Maxillary Arteries embolization and ORIF of Facial Bone Fracture, [place] Type III 2/2 mechanical fall      #Traumatic mechanical fall   #Head Trauma   #Multiple Facial Bone Fracture, [place] Type III.   #Epistaxis (resolved)   #S/P Bilateral Internal Maxillary Arteries embolization    #S/P ORIF of Facial Bone Fracture, [place] Type III   #S/P tracheostomy   #Gait abnormality  CT panscan revealed extensive b/l fascial Type III [place] fractures (bilateral facial fx traversing bilateral orbits, nasal bones, nasal septum, maxillary sinus, pterygoid plates and left zygomatic arch) and soft tissue swelling.    OMFS consulted. Patient underwent (8/1) ORIF of multiple fascial fractures.    ENT consulted for ongoing nasal bleeding s/p rhino rocket. Continues to bleed. Underwent bilateral internal maxillary arteries embolization by neuroendovascular. f/u Dr. Marcum outpatient    Ophthalmology consulted/ for  b/l orbital fractures, no intervention Tracheostomy for airway protection, decannulation 8/8/2024  - Unasyn 3g q6h stopped 8/12 as per OMFS  - Pain control: Tylenol, oxycodone 5 mg q4hr PRN (avoid NSAID)    - Precaution: fall, sinus precautions (open mouth sneezing, no nose blowing), Head of bed elevated @ 30 degrees   - encourage incentive spirometry   - staples removed by OMFS 8/12; patient cleared to shower; there was one remaining staple which was removed by rehab resident on 8/14  - continue full rehab program      #Left optic neuropathy    - Ophthalmology consulted; no acute intervention was required. He had some left eye chronic impairment of vision. The patient feels there has been some further worsening after the facial trauma.     - MRI brain/orbits is required as an out-patient. Follow up of blood test results including heavy metals, B12, folate, syphilis, SARAI, ACE, antiphospholipid antibodies as an outpatient is required.  - Follow up with Dr. Lopez/Opthomology        #leukocytosis (resolved)  - WBC downtrending, low grade temp on 8/9might 2/2 recent steroid use and Percedex.  - WBC wnl, afebrile for 48 hours  - Unasyn stopped 8/12 as per OMFS  - monitor vitals     #L ICA occlusion, R ICA stenosis    - 7/26 CTA neck showed Occlusion of L ICA at the L carotid bulb; Occlusion of the distal cervical portion of the left ICA; Severe stenosis at the right carotid bulb.  No evidence of vascular injury, dissection, or extravasation.   - Echo (10/2023): EF 63%, mildly-enlarged LA, no stress-induced wall motion abnormalities, moderate MR, TR, and AR, sclerotic as per OMFS aortic valve, mild MT, borderline pulmonary HTN, mild aortic root calcification   - Vascular consulted. Recommend outpatient follow w/ Dr. Quigley/Vascular  for surgical planning for possible Right carotid endarterectomy     #CAD w/ prior MI s/p CABG (2000)   #MVR  #HTN   #HLD   - c/w ASA + Plavix    - Home med: Metoprolol 50 mg qd, losartan 100 mg QD, amlodipine 5 mg QD, HCTZ 25 mg QD, Ezetimibe-Simvastatin 10-10   - While inpt: continue metoprolol 12.5 mg bid, losartan 25 mg daily, amlodipine 5 mg QD, HCTZ 25 mg QD   - Continue simvastatin 10 mg   - Monitor and adjust as needed      #acute blood loss anemia    - Hb (8/8/2024) 8.4   - received 1u PRBC  - transfuse if Hb < 7  - improving    #urinary retention (resolved)  - c/w Flomax 0.4 mg qhs  - monitor       #TEO    - Noncompliant with home CPAP   - Hold CPAP while facial injuries heal    -Pain control: Tylenol 650 mg q6h, Oxycodone 5 mg q4hr PRN   -GI/Bowel Mgmt: Miralax/senna  -Bladder management: voiding spontaneously   -Skin: fascial bruising, ecchymosis b/l orbit, L knee contusion    - Diet: Pureed diet  Precautions / PROPHYLAXIS:  Falls, sinus precautions (open mouth sneezing, no nose blowing), Head of bed elevated @ 30 degrees   - Ortho: Weight bearing status: no weight bearing restrictions     - DVT prophylaxis: Lovenox 30 mg q12h   79 y.o. M Estonian speaking with PMH of CAD w/ prior MI s/p CABG (2000), HTN, HLD, MVR, TEO noncompliant with CPAP, recent diagnosis of prostate CA presented on 7/26/2024 to SICU after sustaining injuries from a mechanical fall (+HT, -LOC, -AC). Patient was walking out of his house to see his Cardiologist (Dr. Basurto) when he tripped down his front 2 steps and landed forward on his face onto the concrete.  CT panscan revealed extensive b/l fascial Type III [place] fractures (bilateral facial fx traversing bilateral orbits, nasal bones, nasal septum, maxillary sinus, pterygoid plates and left zygomatic arch) and soft tissue swelling. OMFS consulted. Patient underwent (8/1) ORIF of multiple fascial fractures. ENT consulted for ongoing nasal bleeding s/p rhino rocket. Continued to bleed. Underwent bilateral internal maxillary arteries embolization by neuroendovascular. 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       During patient's hospital course, he developed delirium and violent behavior, code grey called and received Zyprexa and Precedex. Also transfuses 1u PRBC 2/2 acute blood loss anemia. Additionally, patient required tracheostomy (8/1) for airway protection, decannulation 8/8/2024. Labs significant for leukocytosis that continues to downtrend, no active infection identified.  On 8/9/2024, just prior to discharge to acute rehabilitation patient spike a fever of 100.8; therefore admission was postponed. Repeat vitals have been stable, labs reviewed. Patient has not spike a fever in the past 48 hours and was admitted to acute rehabilitation on 8/11/2024. During acute rehabilitation stay, patient participated in 3 hours of therapy (PT/OT/ST). He received Unasyn [id number] until 8/12 as per OMFS. His staples were removed by OMFS on 8/12; patient cleared to shower; there was one remaining staple which was removed by rehab resident on 8/14. Rest of his hospital course during acute rehabilitation was uncomplicated. He is being discharged home on 8/20.    A/P:  Rehab for traumatic head trauma sustaining multiple Facial Bone Fractures, [place] Type III, epistaxis S/P Bilateral Internal Maxillary Arteries embolization and ORIF of Facial Bone Fracture, [place] Type III 2/2 mechanical fall      #Traumatic mechanical fall   #Head Trauma   #Multiple Facial Bone Fracture, [place] Type III.   #Epistaxis (resolved)   #S/P Bilateral Internal Maxillary Arteries embolization    #S/P ORIF of Facial Bone Fracture, [place] Type III   #S/P tracheostomy   #Gait abnormality  CT panscan revealed extensive b/l fascial Type III [place] fractures (bilateral facial fx traversing bilateral orbits, nasal bones, nasal septum, maxillary sinus, pterygoid plates and left zygomatic arch) and soft tissue swelling.    OMFS consulted. Patient underwent (8/1) ORIF of multiple fascial fractures.    ENT consulted for ongoing nasal bleeding s/p rhino rocket. Continues to bleed. Underwent bilateral internal maxillary arteries embolization by neuroendovascular. f/u Dr. Marcum outpatient    Ophthalmology consulted/ for  b/l orbital fractures, no intervention Tracheostomy for airway protection, decannulation 8/8/2024  - Unasyn 3g q6h stopped 8/12 as per OMFS  - Pain control: Tylenol, oxycodone 5 mg q4hr PRN (avoid NSAID)    - Precaution: fall, sinus precautions (open mouth sneezing, no nose blowing), Head of bed elevated @ 30 degrees   - encourage incentive spirometry   - staples removed by OMFS 8/12; patient cleared to shower; there was one remaining staple which was removed by rehab resident on 8/14  - continue full rehab program      #Left optic neuropathy    - Ophthalmology consulted; no acute intervention was required. He had some left eye chronic impairment of vision. The patient feels there has been some further worsening after the facial trauma. He may have suffered a traumatic optic nerve avulson.    - MRI brain/orbits is required as an out-patient. Follow up of blood test results including heavy metals, B12, folate, syphilis, SARAI, ACE, antiphospholipid antibodies as an outpatient is required.  - Follow up with Dr. Lopez/Opthomology        #leukocytosis (resolved)  - WBC downtrending, low grade temp on 8/9might 2/2 recent steroid use and Percedex.  - WBC wnl, afebrile for 48 hours  - Unasyn stopped 8/12 as per OMFS  - monitor vitals     #L ICA occlusion, R ICA stenosis    - 7/26 CTA neck showed Occlusion of L ICA at the L carotid bulb; Occlusion of the distal cervical portion of the left ICA; Severe stenosis at the right carotid bulb.  No evidence of vascular injury, dissection, or extravasation.   - Echo (10/2023): EF 63%, mildly-enlarged LA, no stress-induced wall motion abnormalities, moderate MR, TR, and AR, sclerotic as per OMFS aortic valve, mild NV, borderline pulmonary HTN, mild aortic root calcification   - Vascular consulted. Recommend outpatient follow w/ Dr. Quigley/Vascular  for surgical planning for possible Right carotid endarterectomy  -Suggest F/U with Dr. Townsend/Neurosurgery/Endovascular expert.     #CAD w/ prior MI s/p CABG (2000)   #MVR  #HTN   #HLD   - c/w ASA + Plavix    - Home med: Metoprolol 50 mg qd, losartan 100 mg QD, amlodipine 5 mg QD, HCTZ 25 mg QD, Ezetimibe-Simvastatin 10-10   - While inpt: continue metoprolol 12.5 mg bid, losartan 25 mg daily, amlodipine 5 mg QD, HCTZ 25 mg QD   - Continue simvastatin 10 mg   - Monitor and adjust as needed      #acute blood loss anemia    - Hb (8/8/2024) 8.4   - received 1u PRBC  - transfuse if Hb < 7  - improving    #urinary retention (resolved)  - c/w Flomax 0.4 mg qhs  - monitor       #TEO    - Noncompliant with home CPAP   - Hold CPAP while facial injuries heal    -Pain control: Tylenol 650 mg q6h, Oxycodone 5 mg q4hr PRN   -GI/Bowel Mgmt: Miralax/senna  -Bladder management: voiding spontaneously   -Skin: fascial bruising, ecchymosis b/l orbit, L knee contusion    - Diet: Pureed diet  Precautions / PROPHYLAXIS:  Falls, sinus precautions (open mouth sneezing, no nose blowing), Head of bed elevated @ 30 degrees   - Ortho: Weight bearing status: no weight bearing restrictions     - DVT prophylaxis: Lovenox 30 mg q12h

## 2024-08-19 NOTE — DISCHARGE NOTE PROVIDER - NSDCFUSCHEDAPPT_GEN_ALL_CORE_FT
Seth [place]  [facility] PreAdmits  Scheduled Appointment: 08/28/2024    [facility]  OCCUPTHER [address]  Scheduled Appointment: 08/28/2024    [facility]  PHYSTHER  [address]  Scheduled Appointment: 08/28/2024    [place] FORTUNE  [facility]  UROLOGY [address] [facility]  Scheduled Appointment: 09/25/2024

## 2024-08-19 NOTE — DISCHARGE NOTE PROVIDER - NSDCMRMEDTOKEN_GEN_ALL_CORE_FT
acetaminophen 325 mg oral tablet: 2 tab(s) orally once  amLODIPine 5 mg oral tablet: 1 tab(s) orally once a day  aspirin 81 mg oral tablet: 1 tab(s) orally once a day  clopidogrel 75 mg oral tablet: 1 tab(s) orally once a day  ezetimibe-simvastatin 10 mg-10 mg oral tablet: 1 tab(s) orally once a day  hydroCHLOROthiazide 25 mg oral tablet: 1 tab(s) orally once a day  losartan 25 mg oral tablet: 1 tab(s) orally once a day  losartan 25 mg oral tablet: 1 tab(s) orally once a day  metoprolol succinate 50 mg oral capsule, extended release: 1 cap(s) orally once a day   acetaminophen 325 mg oral tablet: 2 tab(s) orally once  amLODIPine 5 mg oral tablet: 1 tab(s) orally once a day  aspirin 81 mg oral tablet: 1 tab(s) orally once a day  clopidogrel 75 mg oral tablet: 1 tab(s) orally once a day  ezetimibe-simvastatin 10 mg-10 mg oral tablet: 1 tab(s) orally once a day  folic acid 1 mg oral tablet: 1 tab(s) orally once a day  hydroCHLOROthiazide 25 mg oral tablet: 1 tab(s) orally once a day  losartan 25 mg oral tablet: 1 tab(s) orally once a day  metoprolol succinate 25 mg oral capsule, extended release: 1 cap(s) orally once a day  Multiple Vitamins oral tablet: 1 tab(s) orally once a day  senna leaf extract oral tablet: 2 tab(s) orally once a day (at bedtime)  tamsulosin 0.4 mg oral capsule: 1 cap(s) orally once a day (at bedtime)  thiamine 100 mg oral tablet: 1 tab(s) orally once a day  traZODone 50 mg oral tablet: 1 tab(s) orally every 24 hours

## 2024-08-19 NOTE — DISCHARGE NOTE PROVIDER - PROVIDER TOKENS
PROVIDER:[TOKEN:[[id number]],FOLLOWUP:[2 weeks]],PROVIDER:[TOKEN:[[id number]]],PROVIDER:[TOKEN:[[id number]]],PROVIDER:[TOKEN:[[id number]]],PROVIDER:[TOKEN:[[id number]]],PROVIDER:[TOKEN:[[id number]]] PROVIDER:[TOKEN:[[id number]],FOLLOWUP:[2 weeks]],PROVIDER:[TOKEN:[[id number]]],PROVIDER:[TOKEN:[[id number]]],PROVIDER:[TOKEN:[[id number]]],PROVIDER:[TOKEN:[[id number]]],PROVIDER:[TOKEN:[[id number]]],PROVIDER:[TOKEN:[[id number]],ESTABLISHEDPATIENT:[T]]

## 2024-08-20 ENCOUNTER — NON-APPOINTMENT (OUTPATIENT)
Age: 79
End: 2024-08-20

## 2024-08-20 ENCOUNTER — TRANSCRIPTION ENCOUNTER (OUTPATIENT)
Age: 79
End: 2024-08-20

## 2024-08-20 VITALS
RESPIRATION RATE: 19 BRPM | TEMPERATURE: 98 F | DIASTOLIC BLOOD PRESSURE: 63 MMHG | SYSTOLIC BLOOD PRESSURE: 133 MMHG | HEART RATE: 80 BPM

## 2024-08-20 LAB
APTT BLD: 30.6 SEC — SIGNIFICANT CHANGE UP (ref 27–39.2)
DRVVT RATIO: 1.04 RATIO — SIGNIFICANT CHANGE UP (ref 0–1.21)
DRVVT SCREEN TO CONFIRM RATIO: SIGNIFICANT CHANGE UP
NORMALIZED SCT PPP-RTO: 0.94 RATIO — SIGNIFICANT CHANGE UP (ref 0–1.16)
NORMALIZED SCT PPP-RTO: SIGNIFICANT CHANGE UP

## 2024-08-20 RX ADMIN — Medication 1 MILLIGRAM(S): at 12:46

## 2024-08-20 RX ADMIN — CHLORHEXIDINE GLUCONATE 1 APPLICATION(S): 40 SOLUTION TOPICAL at 05:40

## 2024-08-20 RX ADMIN — LOSARTAN POTASSIUM 25 MILLIGRAM(S): 50 TABLET ORAL at 05:41

## 2024-08-20 RX ADMIN — Medication 1 TABLET(S): at 12:46

## 2024-08-20 RX ADMIN — METOPROLOL TARTRATE 12.5 MILLIGRAM(S): 100 TABLET ORAL at 05:41

## 2024-08-20 RX ADMIN — Medication 100 MILLIGRAM(S): at 12:46

## 2024-08-20 RX ADMIN — AMLODIPINE BESYLATE 5 MILLIGRAM(S): 10 TABLET ORAL at 05:40

## 2024-08-20 RX ADMIN — Medication 75 MILLIGRAM(S): at 12:46

## 2024-08-20 RX ADMIN — Medication 81 MILLIGRAM(S): at 12:46

## 2024-08-20 RX ADMIN — ENOXAPARIN SODIUM 30 MILLIGRAM(S): 100 INJECTION SUBCUTANEOUS at 05:41

## 2024-08-20 NOTE — DISCHARGE NOTE NURSING/CASE MANAGEMENT/SOCIAL WORK - PATIENT PORTAL LINK FT
You can access the FollowMyHealth Patient Portal offered by [facility] by registering at the following website: [email or web address]. By joining [employer]’s FollowMyHealth portal, you will also be able to view your health information using other applications (apps) compatible with our system.

## 2024-08-20 NOTE — PROGRESS NOTE ADULT - ASSESSMENT
ASSESSMENT/PLAN  79 y.o. M with PMH of CAD w/ prior MI s/p CABG (2000), HTN, HLD, MVR, TEO noncompliant with CPAP, recent diagnosis of prostate CA presented on 7/26/2024 to SICU after sustaining injuries from a mechanical fall (+HT, -LOC, -AC).  CT panscan revealed extensive b/l facial Type III [place] fractures (bilateral facial fx traversing bilateral orbits, nasal bones, nasal septum, maxillary sinus, pterygoid plates and left zygomatic arch) and soft tissue swelling. OMFS consulted. Patient underwent (8/1) ORIF of multiple facial fractures, bilateral internal maxillary arteries embolization by neuroendovascular.    Rehab for traumatic head trauma sustaining multiple Facial Bone Fractures, [place] Type III, epistaxis S/P Bilateral Internal Maxillary Arteries embolization and ORIF of Facial Bone Fracture, [place] Type III 2/2 mechanical fall      #Traumatic mechanical fall   #Head Trauma   #Multiple Facial Bone Fracture, [place] Type III.   #Epistaxis (resolved)   #S/P Bilateral Internal Maxillary Arteries embolization    #S/P ORIF of Facial Bone Fracture, [place] Type III   #S/P tracheostomy   #Gait abnormality  CT panscan revealed extensive b/l fascial Type III [place] fractures (bilateral facial fx traversing bilateral orbits, nasal bones, nasal septum, maxillary sinus, pterygoid plates and left zygomatic arch) and soft tissue swelling.    OMFS consulted. Patient underwent (8/1) ORIF of multiple fascial fractures.    ENT consulted for ongoing nasal bleeding s/p rhino rocket. Continues to bleed. Underwent bilateral internal maxillary arteries embolization by neuroendovascular. f/u Dr. Marcum outpatient    Ophthalmology consulted/ for  b/l orbital fractures, no intervention Tracheostomy for airway protection, decannulation 8/8/2024  - Unasyn 3g q6h stopped 8/12 as per OMFS  - Pain control: Tylenol, oxycodone 5 mg q4hr PRN (avoid NSAID)    - Precaution: fall, sinus precautions (open mouth sneezing, no nose blowing), Head of bed elevated @ 30 degrees   - encourage incentive spirometry   - staples removed by OMFS 8/12; patient cleared to shower; there was one remaining staple which was removed by rehab resident on 8/14  - continue full rehab program      #Left optic neuropathy    - Ophthalmology consulted; no acute intervention was required. He had some left eye chronic impairment of vision. The patient feels there has been some further worsening after the facial trauma.     - MRI brain/orbits is required as an out-patient. Follow up of blood test results including heavy metals, B12, folate, syphilis, SARAI, ACE, antiphospholipid antibodies as an outpatient is required.  - Follow up with Dr. Lopez/Opthomology        #leukocytosis (resolved)  - WBC downtrending, low grade temp on 8/9might 2/2 recent steroid use and Percedex.  - WBC wnl, afebrile for 48 hours  - Unasyn stopped 8/12 as per OMFS  - monitor vitals     #L ICA occlusion, R ICA stenosis    - 7/26 CTA neck showed Occlusion of L ICA at the L carotid bulb; Occlusion of the distal cervical portion of the left ICA; Severe senosis at the right carotid bulb.  No evidence of vascular injury, dissection, or extravasation.   - Echo (10/2023): EF 63%, mildly-enlarged LA, no stress-induced wall motion abnormalities, moderate MR, TR, and AR, sclerotic as per OMFS aortic valve, mild FL, borderline pulmonary HTN, mild aortic root calcification   - Vascular consulted. Recommend outpatient follow w/ Dr. Quigley/Vascular  for surgical planning for possible Right carotid endarterectomy     #CAD w/ prior MI s/p CABG (2000)   #MVR  #HTN   #HLD   - c/w ASA + Plavix    - Home med: Metoprolol 50 mg qd, losartan 100 mg QD, amlodipine 5 mg QD, HCTZ 25 mg QD, Ezetimibe-Simvastatin 10-10   - While inpt: continue metoprolol 12.5 mg bid, losartan 25 mg daily, amlodipine 5 mg QD, HCTZ 25 mg QD   - Continue simvastatin 10 mg   - Monitor and adjust as needed      #acute blood loss anemia    - Hb (8/8/2024) 8.4   - received 1u PRBC  - transfuse if Hb < 7  - improving    #urinary retention (resolved)  - c/w Flomax 0.4 mg qhs  - monitor       #TEO    - Noncompliant with home CPAP   - Hold CPAP while facial injuries heal    -Pain control: Tylenol 650 mg q6h, Oxycodone 5 mg q4hr PRN   -GI/Bowel Mgmt: Miralax/senna  -Bladder management: voiding spontaneously   -Skin: fascial bruising, ecchymosis b/l orbit, L knee contusion    - Diet: Pureed diet  Precautions / PROPHYLAXIS:  Falls, sinus precautions (open mouth sneezing, no nose blowing), Head of bed elevated @ 30 degrees   - Ortho: Weight bearing status: no weight bearing restrictions     - DVT prophylaxis: Lovenox 30 mg q12h ASSESSMENT/PLAN  79 y.o. M with PMH of CAD w/ prior MI s/p CABG (2000), HTN, HLD, MVR, TEO noncompliant with CPAP, recent diagnosis of prostate CA presented on 7/26/2024 to SICU after sustaining injuries from a mechanical fall (+HT, -LOC, -AC).  CT panscan revealed extensive b/l facial Type III [place] fractures (bilateral facial fx traversing bilateral orbits, nasal bones, nasal septum, maxillary sinus, pterygoid plates and left zygomatic arch) and soft tissue swelling. OMFS consulted. Patient underwent (8/1) ORIF of multiple facial fractures, bilateral internal maxillary arteries embolization by neuroendovascular.    Rehab for traumatic head trauma sustaining multiple Facial Bone Fractures, [place] Type III, epistaxis S/P Bilateral Internal Maxillary Arteries embolization and ORIF of Facial Bone Fracture, [place] Type III 2/2 mechanical fall      #Traumatic mechanical fall   #Head Trauma   #Multiple Facial Bone Fracture, [place] Type III.   #Epistaxis (resolved)   #S/P Bilateral Internal Maxillary Arteries embolization    #S/P ORIF of Facial Bone Fracture, [place] Type III   #S/P tracheostomy   #Gait abnormality  CT panscan revealed extensive b/l fascial Type III [place] fractures (bilateral facial fx traversing bilateral orbits, nasal bones, nasal septum, maxillary sinus, pterygoid plates and left zygomatic arch) and soft tissue swelling.    OMFS consulted. Patient underwent (8/1) ORIF of multiple fascial fractures.    ENT consulted for ongoing nasal bleeding s/p rhino rocket. Continues to bleed. Underwent bilateral internal maxillary arteries embolization by neuroendovascular. f/u Dr. Marcum outpatient    Ophthalmology consulted/ for  b/l orbital fractures, no intervention Tracheostomy for airway protection, decannulation 8/8/2024  - Unasyn 3g q6h stopped 8/12 as per OMFS  - Pain control: Tylenol, oxycodone 5 mg q4hr PRN (avoid NSAID)    - Precaution: fall, sinus precautions (open mouth sneezing, no nose blowing), Head of bed elevated @ 30 degrees   - encourage incentive spirometry   - staples removed by OMFS 8/12; patient cleared to shower; there was one remaining staple which was removed by rehab resident on 8/14  - Stable for discharge home with outpatient PT and OT at [facility]      #Left optic neuropathy    - Ophthalmology consulted; no acute intervention was required. He may have suffered a traumatic optic nerve avulsion. He had some left eye chronic impairment of vision. The patient feels there has been some further worsening after the facial trauma.     - MRI brain/orbits is required as an out-patient. Follow up of blood test results including heavy metals, B12, folate, syphilis, SARAI, ACE, antiphospholipid antibodies as an outpatient is required.  - Follow up with Dr. Lopez/Opthomology        #leukocytosis (resolved)  - WBC downtrending, low grade temp on 8/9might 2/2 recent steroid use and Percedex.  - WBC wnl, afebrile for 48 hours  - Unasyn stopped 8/12 as per OMFS  - monitor vitals     #L ICA occlusion, R ICA stenosis    - 7/26 CTA neck showed Occlusion of L ICA at the L carotid bulb; Occlusion of the distal cervical portion of the left ICA; Severe senosis at the right carotid bulb.  No evidence of vascular injury, dissection, or extravasation.   - Echo (10/2023): EF 63%, mildly-enlarged LA, no stress-induced wall motion abnormalities, moderate MR, TR, and AR, sclerotic as per OMFS aortic valve, mild DE, borderline pulmonary HTN, mild aortic root calcification   - Vascular consulted. Suggest outpatient follow w/ Dr. Quigley/Vascular  for surgical planning for possible Right carotid endarterectomy   - Dr. Tellez/Neurosurgery Endovascular expert for his opinion.      #CAD w/ prior MI s/p CABG (2000)   #MVR  #HTN   #HLD   - c/w ASA + Plavix    - Home med: Metoprolol 50 mg qd, losartan 100 mg QD, amlodipine 5 mg QD, HCTZ 25 mg QD, Ezetimibe-Simvastatin 10-10   - While inpt: continue metoprolol 12.5 mg bid, losartan 25 mg daily, amlodipine 5 mg QD, HCTZ 25 mg QD   - Continue simvastatin 10 mg   - Monitor and adjust as needed      #acute blood loss anemia    - Hb (8/8/2024) 8.4   - received 1u PRBC  - transfuse if Hb < 7  - improving    #urinary retention (resolved)  - c/w Flomax 0.4 mg qhs  - monitor       #TEO    - Noncompliant with home CPAP   - Hold CPAP while facial injuries heal    -Pain control: Tylenol 650 mg q6h, Oxycodone 5 mg q4hr PRN   -GI/Bowel Mgmt: Miralax/senna  -Bladder management: voiding spontaneously   -Skin: fascial bruising, ecchymosis b/l orbit, L knee contusion    - Diet: Pureed diet  Precautions / PROPHYLAXIS:  Falls, sinus precautions (open mouth sneezing, no nose blowing), Head of bed elevated @ 30 degrees   - Ortho: Weight bearing status: no weight bearing restrictions     - DVT prophylaxis: Lovenox 30 mg q12h

## 2024-08-20 NOTE — PROGRESS NOTE ADULT - TIME BILLING
patient contact and cognitive assessment
patient contact and cognitive assessment neuropsychology consult
patient and family contact and feedback
Patient seen and examined with the resident. We discussed the case. I have directed the care. I edited the note. The patient requires acute rehab with 3 hours of daily therapies at least 5 out of 7 days and close physiatry follow up. I participated in the rehab interdisciplinary team meeting; the patient progressed to ambulate 100 ft RW sup. I consulted opthalmology for decreased vision left eye.

## 2024-08-20 NOTE — PROGRESS NOTE ADULT - ATTENDING COMMENTS
Patient seen and examined with the resident. We discussed the case. I have directed the care. I edited the note. The patient requires acute rehab with 3 hours of daily therapies at least 5 out of 7 days and close physiatry follow up.  Rehab for traumatic head trauma sustaining multiple Facial Bone Fractures, [place] Type III, epistaxis S/P Bilateral Internal Maxillary Arteries embolization and ORIF of Facial Bone Fracture, [place] Type III 2/2 mechanical fall      #Traumatic mechanical fall   #Head Trauma   #Multiple Facial Bone Fracture, [place] Type III.   #Epistaxis (resolved)   #S/P Bilateral Internal Maxillary Arteries embolization    #S/P ORIF of Facial Bone Fracture, [place] Type III   #S/P tracheostomy   #Gait abnormality  CT panscan revealed extensive b/l fascial Type III [place] fractures (bilateral facial fx traversing bilateral orbits, nasal bones, nasal septum, maxillary sinus, pterygoid plates and left zygomatic arch) and soft tissue swelling.    OMFS consulted. Patient underwent (8/1) ORIF of multiple fascial fractures.    ENT consulted for ongoing nasal bleeding s/p rhino rocket. Continues to bleed. Underwent bilateral internal maxillary arteries embolization by neuroendovascular. f/u Dr. Marcum outpatient    Ophthalmology consulted for  b/l orbital fractures, no intervention Tracheostomy for airway protection, decannulation 8/8/2024  - c/w Unasyn 3g q6h   - Pain control: Tylenol, oxycodone 5 mg q4hr PRN (avoid NSAID)    - Precaution: fall, sinus precautions (open mouth sneezing, no nose blowing), Head of bed elevated @ 30 degrees   - encourage incentive spirometry   - Monitor for acute bleeding      #L optic neuropathy    - Ophthalmology consulted, believed to be chronic, no acute intervention. Recommend outpatient f/u for MRI brain/orbits, heavy metals, B12, folate, syphilis, SARAI, ACE, antiphospholipid antibx     #leukocytosis (resolved)  - WBC downtrending, low grade temp on 8/9might 2/2 recent steroid use and Percedex  - WBC wnl, afebrile for 48 hours  - on Unasyn  - monitor vitals     #L ICA occlusion, R ICA stenosis    - 7/26 CTA neck showed Occlusion of L ICA at the L carotid bulb; Occlusion of the distal cervical portion of the left ICA; Severe stenosis at the right carotid bulb.  No evidence of vascular injury, dissection, or extravasation.   - Echo (10/2023): EF 63%, mildly-enlarged LA, no stress-induced wall motion abnormalities, moderate MR, TR, and AR, sclerotic aortic valve, mild CA, borderline pulmonary HTN, mild aortic root calcification   - Vascular consulted. Recommend outpatient F/U w/ Dr. Quigley for surgical planning for possible Right carotid endarterectomy     #CAD w/ prior MI s/p CABG (2000)   #MVR  #HTN   #HLD   - c/w ASA + Plavix    - Home med: Metoprolol 50 mg qd, losartan 100 mg QD, amlodipine 5 mg QD, HCTZ 25 mg QD, Ezetimibe-Simvastatin 10-10   - While inpt: continue metoprolol 12.5 mg bid, losartan 25 mg daily, amlodipine 5 mg QD, HCTZ 25 mg QD   - Continue simvastatin 10 mg   - Monitor and adjust as needed      #acute blood loss anemia    - Hb (8/8/2024) 8.4   - received 1u PRBC  - transfuse if Hb < 7  - type and screen      #urinary retention    - c/w Flomax 0.4 mg qhs  - monitor       #TEO    - Noncompliant with home CPAP   - Hold CPAP while facial injuries heal    -Pain control: Tylenol 650 mg q6h, Oxycodone 5 mg q4hr PRN     -GI/Bowel Mgmt: Miralax/senna    -Bladder management: voiding spontaneously
Patient seen and examined with the resident. We discussed the case. I have directed the care. I edited the note. The patient requires acute rehab with 3 hours of daily therapies at least 5 out of 7 days and close physiatry follow up.  Rehab for traumatic head trauma sustaining multiple Facial Bone Fractures, [place] Type III, epistaxis S/P Bilateral Internal Maxillary Arteries embolization and ORIF of Facial Bone Fracture, [place] Type III 2/2 mechanical fall      #Traumatic mechanical fall   #Head Trauma   #Multiple Facial Bone Fracture, [place] Type III.   #Epistaxis (resolved)   #S/P Bilateral Internal Maxillary Arteries embolization    #S/P ORIF of Facial Bone Fracture, [place] Type III   #S/P tracheostomy   #Gait abnormality  CT panscan revealed extensive b/l fascial Type III [place] fractures (bilateral facial fx traversing bilateral orbits, nasal bones, nasal septum, maxillary sinus, pterygoid plates and left zygomatic arch) and soft tissue swelling.    OMFS consulted. Patient underwent (8/1) ORIF of multiple fascial fractures.    ENT consulted for ongoing nasal bleeding s/p rhino rocket. Continues to bleed. Underwent bilateral internal maxillary arteries embolization by neuroendovascular. f/u Dr. Marcum outpatient    Ophthalmology consulted/ for  b/l orbital fractures, no intervention Tracheostomy for airway protection, decannulation 8/8/2024  - Unasyn 3g q6h stopped 8/12 as per OMFS  - Pain control: Tylenol, oxycodone 5 mg q4hr PRN (avoid NSAID)    - Precaution: fall, sinus precautions (open mouth sneezing, no nose blowing), Head of bed elevated @ 30 degrees   - encourage incentive spirometry   - staples removed by OMFS 8/12  - Monitor for acute bleeding      #L optic neuropathy    - Ophthalmology consulted, believed to be chronic, no acute intervention. Recommend outpatient f/u for MRI brain/orbits, heavy metals, B12, folate, syphilis, SARAI, ACE, antiphospholipid antibx     #leukocytosis (resolved)  - WBC downtrending, low grade temp on 8/9might 2/2 recent steroid use and Percedex  - WBC wnl, afebrile for 48 hours  - Unasyn stopped 8/12 as per OMFS  - monitor vitals     #L ICA occlusion, R ICA stenosis    - 7/26 CTA neck showed Occlusion of L ICA at the L carotid bulb; Occlusion of the distal cervical portion of the left ICA; Severe senosis at the right carotid bulb.  No evidence of vascular injury, dissection, or extravasation.   - Echo (10/2023): EF 63%, mildly-enlarged LA, no stress-induced wall motion abnormalities, moderate MR, TR, and AR, sclerotic as per OMFS aortic valve, mild [place], borderline pulmonary HTN, mild aortic root calcification   - Vascular consulted. Recommend outpatient F/U w/ Dr. Quigley for surgical planning for possible Right carotid endarterectomy     #CAD w/ prior MI s/p CABG (2000)   #MVR  #HTN   #HLD   - c/w ASA + Plavix    - Home med: Metoprolol 50 mg qd, losartan 100 mg QD, amlodipine 5 mg QD, HCTZ 25 mg QD, Ezetimibe-Simvastatin 10-10   - While inpt: continue metoprolol 12.5 mg bid, losartan 25 mg daily, amlodipine 5 mg QD, HCTZ 25 mg QD   - Continue simvastatin 10 mg   - Monitor and adjust as needed      #acute blood loss anemia    - Hb (8/8/2024) 8.4   - received 1u PRBC  - transfuse if Hb < 7  - type and screen      #urinary retention    - c/w Flomax 0.4 mg qhs  - monitor       #TEO    - Noncompliant with home CPAP   - Hold CPAP while facial injuries heal
Patient seen and examined with the resident. We discussed the case. I have directed the care. I edited the note. The patient requires acute rehab with 3 hours of daily therapies at least 5 out of 7 days and close physiatry follow up.  Rehab for traumatic head trauma sustaining multiple Facial Bone Fractures, [place] Type III, epistaxis S/P Bilateral Internal Maxillary Arteries embolization and ORIF of Facial Bone Fracture, [place] Type III 2/2 mechanical fall      #Traumatic mechanical fall   #Head Trauma   #Multiple Facial Bone Fracture, [place] Type III.   #Epistaxis (resolved)   #S/P Bilateral Internal Maxillary Arteries embolization    #S/P ORIF of Facial Bone Fracture, [place] Type III   #S/P tracheostomy   #Gait abnormality  CT panscan revealed extensive b/l fascial Type III [place] fractures (bilateral facial fx traversing bilateral orbits, nasal bones, nasal septum, maxillary sinus, pterygoid plates and left zygomatic arch) and soft tissue swelling.    OMFS consulted. Patient underwent (8/1) ORIF of multiple fascial fractures.    ENT consulted for ongoing nasal bleeding s/p rhino rocket. Continues to bleed. Underwent bilateral internal maxillary arteries embolization by neuroendovascular. f/u Dr. Marcum outpatient    Ophthalmology consulted/ for  b/l orbital fractures, no intervention Tracheostomy for airway protection, decannulation 8/8/2024  - Unasyn 3g q6h stopped 8/12 as per OMFS  - Pain control: Tylenol, oxycodone 5 mg q4hr PRN (avoid NSAID)    - Precaution: fall, sinus precautions (open mouth sneezing, no nose blowing), Head of bed elevated @ 30 degrees   - encourage incentive spirometry   - staples removed by OMFS 8/12; patient cleared to shower; there was one remaining stable which was removed by rehab resident on 8/14  - Monitor for acute bleeding      #L optic neuropathy    - Ophthalmology consulted, believed to be chronic, no acute intervention.   - Recommend outpatient f/u for MRI brain/orbits, heavy metals, B12, folate, syphilis, SARAI, ACE, antiphospholipid antibx     #leukocytosis (resolved)  - WBC downtrending, low grade temp on 8/9might 2/2 recent steroid use and Percedex  - WBC wnl, afebrile for 48 hours  - Unasyn stopped 8/12 as per OMFS  - monitor vitals     #L ICA occlusion, R ICA stenosis    - 7/26 CTA neck showed Occlusion of L ICA at the L carotid bulb; Occlusion of the distal cervical portion of the left ICA; Severe senosis at the right carotid bulb.  No evidence of vascular injury, dissection, or extravasation.   - Echo (10/2023): EF 63%, mildly-enlarged LA, no stress-induced wall motion abnormalities, moderate MR, TR, and AR, sclerotic as per OMFS aortic valve, mild [place], borderline pulmonary HTN, mild aortic root calcification   - Vascular consulted. Recommend outpatient F/U w/ Dr. Quigley for surgical planning for possible Right carotid endarterectomy     #CAD w/ prior MI s/p CABG (2000)   #MVR  #HTN   #HLD   - c/w ASA + Plavix    - Home med: Metoprolol 50 mg qd, losartan 100 mg QD, amlodipine 5 mg QD, HCTZ 25 mg QD, Ezetimibe-Simvastatin 10-10   - While inpt: continue metoprolol 12.5 mg bid, losartan 25 mg daily, amlodipine 5 mg QD, HCTZ 25 mg QD   - Continue simvastatin 10 mg   - Monitor and adjust as needed      #acute blood loss anemia    - Hb (8/8/2024) 8.4   - received 1u PRBC  - transfuse if Hb < 7  - type and screen      #urinary retention (resolved)  - c/w Flomax 0.4 mg qhs  - monitor       #TEO    - Noncompliant with home CPAP   - Hold CPAP while facial injuries heal    -Pain control: Tylenol 650 mg q6h, Oxycodone 5 mg q4hr PRN     -GI/Bowel Mgmt: Miralax/senna    -Bladder management: voiding spontaneously
Patient seen and examined with the resident. We discussed the case. I have directed the care. I edited the note. He progressed wonderfully on acute rehab. He is stable for discharge home with outpatient OT and PT. He lives with his wife.  Rehab for traumatic head trauma sustaining multiple Facial Bone Fractures, [place] Type III, epistaxis S/P Bilateral Internal Maxillary Arteries embolization and ORIF of Facial Bone Fracture, [place] Type III 2/2 mechanical fall      #Traumatic mechanical fall   #Head Trauma   #Multiple Facial Bone Fracture, [place] Type III.   #Epistaxis (resolved)   #S/P Bilateral Internal Maxillary Arteries embolization    #S/P ORIF of Facial Bone Fracture, [place] Type III   #S/P tracheostomy   #Gait abnormality  CT panscan revealed extensive b/l fascial Type III [place] fractures (bilateral facial fx traversing bilateral orbits, nasal bones, nasal septum, maxillary sinus, pterygoid plates and left zygomatic arch) and soft tissue swelling.    OMFS consulted. Patient underwent (8/1) ORIF of multiple fascial fractures.    ENT consulted for ongoing nasal bleeding s/p rhino rocket. Continues to bleed. Underwent bilateral internal maxillary arteries embolization by neuroendovascular. f/u Dr. Marcum outpatient    Ophthalmology consulted/ for  b/l orbital fractures, no intervention Tracheostomy for airway protection, decannulation 8/8/2024  - Unasyn 3g q6h stopped 8/12 as per OMFS  - Pain control: Tylenol, oxycodone 5 mg q4hr PRN (avoid NSAID)    - Precaution: fall, sinus precautions (open mouth sneezing, no nose blowing), Head of bed elevated @ 30 degrees   - encourage incentive spirometry   - staples removed by OMFS 8/12; patient cleared to shower; there was one remaining staple which was removed by rehab resident on 8/14  - Stable for discharge home with outpatient PT and OT at [facility]      #Left optic neuropathy    - Ophthalmology consulted; no acute intervention was required. He may have suffered a traumatic optic nerve avulsion. He had some left eye chronic impairment of vision. The patient feels there has been some further worsening after the facial trauma.     - MRI brain/orbits is required as an out-patient. Follow up of blood test results including heavy metals, B12, folate, syphilis, SARAI, ACE, antiphospholipid antibodies as an outpatient is required.  - Follow up with Dr. Lopez/Opthomology        #leukocytosis (resolved)  - WBC downtrending, low grade temp on 8/9might 2/2 recent steroid use and Percedex.  - WBC wnl, afebrile for 48 hours  - Unasyn stopped 8/12 as per OMFS  - monitor vitals     #L ICA occlusion, R ICA stenosis    - 7/26 CTA neck showed Occlusion of L ICA at the L carotid bulb; Occlusion of the distal cervical portion of the left ICA; Severe senosis at the right carotid bulb.  No evidence of vascular injury, dissection, or extravasation.   - Echo (10/2023): EF 63%, mildly-enlarged LA, no stress-induced wall motion abnormalities, moderate MR, TR, and AR, sclerotic as per OMFS aortic valve, mild MI, borderline pulmonary HTN, mild aortic root calcification   - Vascular consulted. Suggest outpatient follow w/ Dr. Quigley/Vascular  for surgical planning for possible Right carotid endarterectomy   - Dr. Tellez/Neurosurgery Endovascular expert for his opinion.      #CAD w/ prior MI s/p CABG (2000)   #MVR  #HTN   #HLD   - c/w ASA + Plavix    - Home med: Metoprolol 50 mg qd, losartan 100 mg QD, amlodipine 5 mg QD, HCTZ 25 mg QD, Ezetimibe-Simvastatin 10-10   - While inpt: continue metoprolol 12.5 mg bid, losartan 25 mg daily, amlodipine 5 mg QD, HCTZ 25 mg QD   - Continue simvastatin 10 mg   - Monitor and adjust as needed      #acute blood loss anemia    - Hb (8/8/2024) 8.4   - received 1u PRBC  - transfuse if Hb < 7  - improving    #urinary retention (resolved)  - c/w Flomax 0.4 mg qhs  - monitor       #TEO    - Noncompliant with home CPAP   - Hold CPAP while facial injuries heal    -Pain control: Tylenol 650 mg q6h, Oxycodone 5 mg q4hr PRN   -GI/Bowel Mgmt: Miralax/senna  -Bladder management: voiding spontaneously   -Skin: fascial bruising, ecchymosis b/l orbit, L knee contusion
Patient seen and examined with the resident. We discussed the case. I have directed the care. I edited the note. The patient requires acute rehab with 3 hours of daily therapies at least 5 out of 7 days and close physiatry follow up.
Patient seen and examined with the resident. We discussed the case. I have directed the care. I edited the note. The patient requires acute rehab with 3 hours of daily therapies at least 5 out of 7 days and close physiatry follow up.  Rehab for traumatic head trauma sustaining multiple Facial Bone Fractures, [place] Type III, epistaxis S/P Bilateral Internal Maxillary Arteries embolization and ORIF of Facial Bone Fracture, [place] Type III 2/2 mechanical fall      #Traumatic mechanical fall   #Head Trauma   #Multiple Facial Bone Fracture, [place] Type III.   #Epistaxis (resolved)   #S/P Bilateral Internal Maxillary Arteries embolization    #S/P ORIF of Facial Bone Fracture, [place] Type III   #S/P tracheostomy   #Gait abnormality  CT panscan revealed extensive b/l fascial Type III [place] fractures (bilateral facial fx traversing bilateral orbits, nasal bones, nasal septum, maxillary sinus, pterygoid plates and left zygomatic arch) and soft tissue swelling.    OMFS consulted. Patient underwent (8/1) ORIF of multiple fascial fractures.    ENT consulted for ongoing nasal bleeding s/p rhino rocket. Continues to bleed. Underwent bilateral internal maxillary arteries embolization by neuroendovascular. f/u Dr. Marcum outpatient    Ophthalmology consulted/ for  b/l orbital fractures, no intervention Tracheostomy for airway protection, decannulation 8/8/2024  - Unasyn 3g q6h stopped 8/12 as per OMFS  - Pain control: Tylenol, oxycodone 5 mg q4hr PRN (avoid NSAID)    - Precaution: fall, sinus precautions (open mouth sneezing, no nose blowing), Head of bed elevated @ 30 degrees   - encourage incentive spirometry   - staples removed by OMFS 8/12; patient cleared to shower; there was one remaining stable which was removed by rehab resident on 8/14  - Monitor for acute bleeding      #L optic neuropathy    - Ophthalmology consulted, believed to be chronic, no acute intervention.   - Recommend outpatient f/u for MRI brain/orbits, heavy metals, B12, folate, syphilis, SARAI, ACE, antiphospholipid antibx     #leukocytosis (resolved)  - WBC downtrending, low grade temp on 8/9might 2/2 recent steroid use and Percedex  - WBC wnl, afebrile for 48 hours  - Unasyn stopped 8/12 as per OMFS  - monitor vitals     #L ICA occlusion, R ICA stenosis    - 7/26 CTA neck showed Occlusion of L ICA at the L carotid bulb; Occlusion of the distal cervical portion of the left ICA; Severe senosis at the right carotid bulb.  No evidence of vascular injury, dissection, or extravasation.   - Echo (10/2023): EF 63%, mildly-enlarged LA, no stress-induced wall motion abnormalities, moderate MR, TR, and AR, sclerotic as per OMFS aortic valve, mild GA, borderline pulmonary HTN, mild aortic root calcification   - Vascular consulted. Recommend outpatient F/U w/ Dr. Quigley for surgical planning for possible Right carotid endarterectomy     #CAD w/ prior MI s/p CABG (2000)   #MVR  #HTN   #HLD   - c/w ASA + Plavix    - Home med: Metoprolol 50 mg qd, losartan 100 mg QD, amlodipine 5 mg QD, HCTZ 25 mg QD, Ezetimibe-Simvastatin 10-10   - While inpt: continue metoprolol 12.5 mg bid, losartan 25 mg daily, amlodipine 5 mg QD, HCTZ 25 mg QD   - Continue simvastatin 10 mg   - Monitor and adjust as needed      #acute blood loss anemia    - Hb (8/8/2024) 8.4   - received 1u PRBC  - transfuse if Hb < 7  - type and screen      #urinary retention (resolved)  - c/w Flomax 0.4 mg qhs  - monitor
Patient seen and examined with the resident. We discussed the case. I have directed the care. I edited the note. The patient requires acute rehab with 3 hours of daily therapies at least 5 out of 7 days and close physiatry follow up. He should follow up with Vascular and Opthalmology as an outpoatient.   Rehab for traumatic head trauma sustaining multiple Facial Bone Fractures, [place] Type III, epistaxis S/P Bilateral Internal Maxillary Arteries embolization and ORIF of Facial Bone Fracture, [place] Type III 2/2 mechanical fall      #Traumatic mechanical fall   #Head Trauma   #Multiple Facial Bone Fracture, [place] Type III.   #Epistaxis (resolved)   #S/P Bilateral Internal Maxillary Arteries embolization    #S/P ORIF of Facial Bone Fracture, [place] Type III   #S/P tracheostomy   #Gait abnormality  CT panscan revealed extensive b/l fascial Type III [place] fractures (bilateral facial fx traversing bilateral orbits, nasal bones, nasal septum, maxillary sinus, pterygoid plates and left zygomatic arch) and soft tissue swelling.    OMFS consulted. Patient underwent (8/1) ORIF of multiple fascial fractures.    ENT consulted for ongoing nasal bleeding s/p rhino rocket. Continues to bleed. Underwent bilateral internal maxillary arteries embolization by neuroendovascular. f/u Dr. Marcum outpatient    Ophthalmology consulted/ for  b/l orbital fractures, no intervention Tracheostomy for airway protection, decannulation 8/8/2024  - Unasyn 3g q6h stopped 8/12 as per OMFS  - Pain control: Tylenol, oxycodone 5 mg q4hr PRN (avoid NSAID)    - Precaution: fall, sinus precautions (open mouth sneezing, no nose blowing), Head of bed elevated @ 30 degrees   - encourage incentive spirometry   - staples removed by OMFS 8/12; patient cleared to shower; there was one remaining staple which was removed by rehab resident on 8/14  - continue full rehab program      #Left optic neuropathy    - Ophthalmology consulted; no acute intervention was required. He had some left eye chronic impairment of vision. The patient feels there has been some further worsening after the facial trauma.     - MRI brain/orbits is required as an out-patient. Follow up of blood test results including heavy metals, B12, folate, syphilis, SARAI, ACE, antiphospholipid antibodies as an outpatient is required.  - Follow up with Dr. Lopez/Opthomology        #leukocytosis (resolved)  - WBC downtrending, low grade temp on 8/9might 2/2 recent steroid use and Percedex.  - WBC wnl, afebrile for 48 hours  - Unasyn stopped 8/12 as per OMFS  - monitor vitals     #L ICA occlusion, R ICA stenosis    - 7/26 CTA neck showed Occlusion of L ICA at the L carotid bulb; Occlusion of the distal cervical portion of the left ICA; Severe senosis at the right carotid bulb.  No evidence of vascular injury, dissection, or extravasation.   - Echo (10/2023): EF 63%, mildly-enlarged LA, no stress-induced wall motion abnormalities, moderate MR, TR, and AR, sclerotic as per OMFS aortic valve, mild TN, borderline pulmonary HTN, mild aortic root calcification   - Vascular consulted. Recommend outpatient follow w/ Dr. Quigley/Vascular  for surgical planning for possible Right carotid endarterectomy     #CAD w/ prior MI s/p CABG (2000)   #MVR  #HTN   #HLD   - c/w ASA + Plavix    - Home med: Metoprolol 50 mg qd, losartan 100 mg QD, amlodipine 5 mg QD, HCTZ 25 mg QD, Ezetimibe-Simvastatin 10-10   - While inpt: continue metoprolol 12.5 mg bid, losartan 25 mg daily, amlodipine 5 mg QD, HCTZ 25 mg QD   - Continue simvastatin 10 mg   - Monitor and adjust as needed      #acute blood loss anemia    - Hb (8/8/2024) 8.4   - received 1u PRBC  - transfuse if Hb < 7  - improving    #urinary retention (resolved)  - c/w Flomax 0.4 mg qhs  - monitor       #TEO    - Noncompliant with home CPAP   - Hold CPAP while facial injuries heal    -Pain control: Tylenol 650 mg q6h, Oxycodone 5 mg q4hr PRN   -GI/Bowel Mgmt: Miralax/senna  -Bladder management: voiding spontaneously   -Skin: fascial bruising, ecchymosis b/l orbit, L knee contusion
Patient seen and examined with the resident. We discussed the case. I have directed the care. I edited the note. The patient requires acute rehab with 3 hours of daily therapies at least 5 out of 7 days and close physiatry follow up. I participated in the rehab interdisciplinary team meeting; the patient progressed to ambulate 100 ft RW sup. I consulted opthalmology for decreased vision left eye.  Rehab for traumatic head trauma sustaining multiple Facial Bone Fractures, [place] Type III, epistaxis S/P Bilateral Internal Maxillary Arteries embolization and ORIF of Facial Bone Fracture, [place] Type III 2/2 mechanical fall      #Traumatic mechanical fall   #Head Trauma   #Multiple Facial Bone Fracture, [place] Type III.   #Epistaxis (resolved)   #S/P Bilateral Internal Maxillary Arteries embolization    #S/P ORIF of Facial Bone Fracture, [place] Type III   #S/P tracheostomy   #Gait abnormality  CT panscan revealed extensive b/l fascial Type III [place] fractures (bilateral facial fx traversing bilateral orbits, nasal bones, nasal septum, maxillary sinus, pterygoid plates and left zygomatic arch) and soft tissue swelling.    OMFS consulted. Patient underwent (8/1) ORIF of multiple fascial fractures.    ENT consulted for ongoing nasal bleeding s/p rhino rocket. Continues to bleed. Underwent bilateral internal maxillary arteries embolization by neuroendovascular. f/u Dr. Marcum outpatient    Ophthalmology consulted/ for  b/l orbital fractures, no intervention Tracheostomy for airway protection, decannulation 8/8/2024  - Unasyn 3g q6h stopped 8/12 as per OMFS  - Pain control: Tylenol, oxycodone 5 mg q4hr PRN (avoid NSAID)    - Precaution: fall, sinus precautions (open mouth sneezing, no nose blowing), Head of bed elevated @ 30 degrees   - encourage incentive spirometry   - staples removed by OMFS 8/12; patient cleared to shower  - Monitor for acute bleeding      #L optic neuropathy    - Ophthalmology consulted, believed to be chronic, no acute intervention. Recommend outpatient f/u for MRI brain/orbits, heavy metals, B12, folate, syphilis, SARAI, ACE, antiphospholipid antibx     #leukocytosis (resolved)  - WBC downtrending, low grade temp on 8/9might 2/2 recent steroid use and Percedex  - WBC wnl, afebrile for 48 hours  - Unasyn stopped 8/12 as per OMFS  - monitor vitals     #L ICA occlusion, R ICA stenosis    - 7/26 CTA neck showed Occlusion of L ICA at the L carotid bulb; Occlusion of the distal cervical portion of the left ICA; Severe senosis at the right carotid bulb.  No evidence of vascular injury, dissection, or extravasation.   - Echo (10/2023): EF 63%, mildly-enlarged LA, no stress-induced wall motion abnormalities, moderate MR, TR, and AR, sclerotic as per OMFS aortic valve, mild MA, borderline pulmonary HTN, mild aortic root calcification   - Vascular consulted. Recommend outpatient F/U w/ Dr. Quigley for surgical planning for possible Right carotid endarterectomy     #CAD w/ prior MI s/p CABG (2000)   #MVR  #HTN   #HLD   - c/w ASA + Plavix    - Home med: Metoprolol 50 mg qd, losartan 100 mg QD, amlodipine 5 mg QD, HCTZ 25 mg QD, Ezetimibe-Simvastatin 10-10   - While inpt: continue metoprolol 12.5 mg bid, losartan 25 mg daily, amlodipine 5 mg QD, HCTZ 25 mg QD   - Continue simvastatin 10 mg   - Monitor and adjust as needed      #acute blood loss anemia    - Hb (8/8/2024) 8.4   - received 1u PRBC  - transfuse if Hb < 7  - type and screen      #urinary retention (resolved)  - c/w Flomax 0.4 mg qhs  - monitor       #TEO    - Noncompliant with home CPAP   - Hold CPAP while facial injuries heal    -Pain control: Tylenol 650 mg q6h, Oxycodone 5 mg q4hr PRN     -GI/Bowel Mgmt: Miralax/senna
I reviewed the chart and examined the patient with the resident and we discussed the findings and treatment plan.  The patient is tolerating the rehab program well. I agree with the findings and treatment plan above, which I modified as indicated. The patient requires 3 hrs a day of acute inpatient rehab. No new complaints. Alert. VSS. Neuro exam stable. Denies pain. Tolerating diet and participating well in therapies. Continue rehab.    I read, edited and agree with the physical exam above and assessment:  #Traumatic mechanical fall   #Head Trauma   #Multiple Facial Bone Fracture, [place] Type III.   #Epistaxis (resolved)   #S/P Bilateral Internal Maxillary Arteries embolization    #S/P ORIF of Facial Bone Fracture, [place] Type III   #S/P tracheostomy   #Gait abnormality  CT panscan revealed extensive b/l fascial Type III [place] fractures (bilateral facial fx traversing bilateral orbits, nasal bones, nasal septum, maxillary sinus, pterygoid plates and left zygomatic arch) and soft tissue swelling.    OMFS consulted. Patient underwent (8/1) ORIF of multiple fascial fractures.    ENT consulted for ongoing nasal bleeding s/p rhino rocket. Continues to bleed. Underwent bilateral internal maxillary arteries embolization by neuroendovascular. f/u Dr. Marcum outpatient    Ophthalmology consulted/ for  b/l orbital fractures, no intervention Tracheostomy for airway protection, decannulation 8/8/2024  - Unasyn 3g q6h stopped 8/12 as per OMFS  - Pain control: Tylenol, oxycodone 5 mg q4hr PRN (avoid NSAID)    - Precaution: fall, sinus precautions (open mouth sneezing, no nose blowing), Head of bed elevated @ 30 degrees   - encourage incentive spirometry   - staples removed by OMFS 8/12; patient cleared to shower; there was one remaining staple which was removed by rehab resident on 8/14  - continue full rehab program    #L optic neuropathy    - Ophthalmology consulted, believed to be chronic, no acute intervention.   - Recommend outpatient f/u for MRI brain/orbits, heavy metals, B12, folate, syphilis, SARAI, ACE, antiphospholipid antibx     #leukocytosis (resolved)  - WBC downtrending, low grade temp on 8/9might 2/2 recent steroid use and Percedex  - WBC wnl, afebrile for 48 hours  - Unasyn stopped 8/12 as per OMFS  - monitor vitals     #L ICA occlusion, R ICA stenosis    - 7/26 CTA neck showed Occlusion of L ICA at the L carotid bulb; Occlusion of the distal cervical portion of the left ICA; Severe senosis at the right carotid bulb.  No evidence of vascular injury, dissection, or extravasation.   - Echo (10/2023): EF 63%, mildly-enlarged LA, no stress-induced wall motion abnormalities, moderate MR, TR, and AR, sclerotic as per OMFS aortic valve, mild IA, borderline pulmonary HTN, mild aortic root calcification   - Vascular consulted. Recommend outpatient F/U w/ Dr. Quigley for surgical planning for possible Right carotid endarterectomy     #CAD w/ prior MI s/p CABG (2000)   #MVR  #HTN   #HLD   - c/w ASA + Plavix    - Home med: Metoprolol 50 mg qd, losartan 100 mg QD, amlodipine 5 mg QD, HCTZ 25 mg QD, Ezetimibe-Simvastatin 10-10   - While inpt: continue metoprolol 12.5 mg bid, losartan 25 mg daily, amlodipine 5 mg QD, HCTZ 25 mg QD   - Continue simvastatin 10 mg   - Monitor and adjust as needed      #acute blood loss anemia    - Hb (8/8/2024) 8.4   - received 1u PRBC  - transfuse if Hb < 7  - improving    #urinary retention (resolved)  - c/w Flomax 0.4 mg qhs  - monitor       #TEO    - Noncompliant with home CPAP   - Hold CPAP while facial injuries heal

## 2024-08-20 NOTE — PROGRESS NOTE ADULT - PROVIDER SPECIALTY LIST ADULT
Rehab Medicine
Neuropsychology
OMFS
Rehab Medicine
Rehab Medicine
Neuropsychology
Rehab Medicine
Neuropsychology

## 2024-08-20 NOTE — DISCHARGE NOTE NURSING/CASE MANAGEMENT/SOCIAL WORK - NSDCVIVACCINE_GEN_ALL_CORE_FT
COVID-19, mRNA, LNP-S, PF, 100 mcg/ 0.5 mL dose (Moderna); 2021/3/12 ; Beronica Rivera (); [employer] [id number] (Exp. Date: 24-Aug-2021);   COVID-19, mRNA, LNP-S, PF, 100 mcg/ 0.5 mL dose (Moderna); 2021/4/9 ; Beronica Rivera (); [employer] [id number] (Exp. Date: 31-Aug-2021);   COVID-19, mRNA, LNP-S, PF, 100 mcg/ 0.5 mL dose (Moderna); 2021/12/3 ; Beronica Rivera (); [employer] 0.25 ML;   COVID-19, mRNA, LNP-S, PF, 100 mcg/ 0.5 mL dose (Moderna); 2022/4/9 ; Beronica Rivera (); [employer] [id number] (Exp. Date: 11-Jul-2022); 0.25 ML;   Moderna COVID-19 Vaccine, Bivalent; 2022/11/4 ; Beronica Rivera (); [employer] [id number] (Exp. Date: 01-Jun-2023); 0.5 ML;   COVID-19, mRNA, LNP-S, PF, 50 mcg/0.5 mL (Spikevax); 2023/10/15 ; Beronica Rivera (); [employer] [id number] (Exp. Date: 20-May-2024); 0.5 ML;   Influenza, high dose seasonal; 2020/11/20 ; Beronica Rivera (); [employer];   influenza, high-dose, quadrivalent; 2021/11/11 ; Beronica Rivera (); [employer]; [id number] (Exp. Date: 30-Jun-2022);

## 2024-08-20 NOTE — PROGRESS NOTE ADULT - SUBJECTIVE AND OBJECTIVE BOX
Patient is a 79y old  Male who presents with a chief complaint of Rehab for traumatic head trauma sustaining multiple Facial Bone Fracture, [place] Type III, epistaxis S/P Bilateral Internal Maxillary Arteries embolization and ORIF of Facial Bone Fracture, [place] Type III 2/2 mechanical fall (09 Aug 2024 14:51)      HPI:  August 11, 2024 admission    79 y.o. M Greenlandic speaking  with PMH of CAD w/ prior MI s/p CABG (2000), HTN, HLD, MVR, TEO noncompliant with CPAP, recent diagnosis of prostate CA presented on 7/26/2024 to SICU after sustaining injuries from a mechanical fall (+HT, -LOC, -AC). Patient was walking out of his house to see his Cardiologist (Dr. Basurto) when he tripped down his front 2 steps and landed forward on his face onto the concrete.  CT panscan revealed extensive b/l fascial Type III [place] fractures (bilateral facial fx traversing bilateral orbits, nasal bones, nasal septum, maxillary sinus, pterygoid plates and left zygomatic arch) and soft tissue swelling. OMFS consulted. Patient underwent (8/1) ORIF of multiple fascial fractures. ENT consulted for ongoing nasal bleeding s/p rhino rocket. Continues to bleed. Underwent bilateral internal maxillary arteries embolization by neuroendovascular. 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       During patient's hospital course, he developed delirium and violent behavior, code grey called and received Zyprexa and Precedex. Also transfuses 1u PRBC 2/2 acute blood loss anemia. Additionally, patient required tracheostomy (8/1) for airway protection, decannulation 8/8/2024. Labs significant for leukocytosis that continues to downtrend, no active infection identified.  On 8/9/2024, just prior to discharge to acute rehabilitation patient spike a fever of 100.8; therefore admission was postponed. Repeat vitals have been stable, labs reviewed. Patient has not spike a fever in the past 48 hours and is medically stable to admit to acute rehabilitation on 8/11/2024    During their stay, the patient was evaluated by physical and occupational therapy. Prior to admission, patient was independent with ambulation and ADLs. The most recent evaluated functional status is Kevin for bed mobility, modA for transfers, modA for ambulation x30 feet with RW, set up for upper body dressing, and contact guard for lower body dressing. Patient was deemed a good acute rehab candidate due to decline in functional status and ability to carry out activities of daily living. Patient is able to tolerate 3 hours of therapy 5-6 days a week and is motivated to participate.     LS: Lives with his wife in a private house with 5 GERALDINE and 14 flight of stairs to the 2nd floor bedroom.    PLOF: Independent with ADLs and IADLs. Ambulated independently without any AD.        TODAY'S SUBJECTIVE & REVIEW OF SYMPTOMS    Discharge home today.     CLOF: 100' RW supervision, Transfers SA, UBD set up. LBD PA    Constitutional    [ x  ] WNL           [   ] poor appetite   [   ] insomnia   [   ] tired   Cardio:                [x   ] WNL           [   ] CP   [   ] MCDANIEL   [   ] palpitations               Resp:                   [ x  ] WNL           [   ] SOB   [   ] cough   [   ] wheezing   GI:                        [ x  ] WNL           [   ] constipation   [   ] diarrhea   [   ] abdominal pain   [   ] nausea   [   ] emesis                                :                      [  x ] WNL           [   ] SOLORZANO  [   ] dysuria   [   ] difficulty voiding             Endo:                   [  x ] WNL          [   ] polyuria   [   ] temperature intolerance                 Skin:                     [   ] WNL          [   ] pain   [ x  ] wound   [   ] rash   MSK:                    [x   ] WNL          [   ] muscle pain   [   ] joint pain/ stiffness   [   ] muscle tenderness   [   ] swelling   Neuro:                 [   ] WNL          [   ] HA   [   x] change in vision   [x   ] tremor: improving   [ x  ] weakness   [   ]dysphagia              Cognitive:           [ x  ] WNL           [   ]confusion      Psych:                  [ x  ] WNL           [   ] hallucinations   [   ]agitation   [   ] delusion   [   ]depression    PHYSICAL EXAM  T(C): 37.1 (08-19-24 @ 20:00), Max: 37.1 (08-19-24 @ 20:00)  T(F): 98.7 (08-19-24 @ 20:00), Max: 98.7 (08-19-24 @ 20:00)  HR: 84 (08-20-24 @ 05:30) (77 - 88)  BP: 120/66 (08-20-24 @ 05:30) (112/67 - 133/68)  ABP: --  ABP(mean): --  RR: 18 (08-19-24 @ 20:00) (16 - 18)  SpO2: --    General:[ x  ] NAD, Resting Comfortable,   [   ] other:                                HEENT: [  x ] NC/AT, EOMI, PERRL , [] Normal Conjunctivae,   [   ] other: decreased L with L subconjuctival hemorrhage   Cardio: [  x ] RRR, [x] grade II/VI systolic ejection murmur,   [   ] other:                           Pulm: [ x  ] No Respiratory Distress,  Lungs CTAB,   [   ] other:                       Abdomen: [ x  ]ND/NT, Soft,   [   ] other:    : [ x  ] NO SOLORZANO CATHETER, [   ] SOLORZANO CATHETER- no meatal tear, no discharge, [   ] other:                          MSK: [ x  ] No joint swelling, Full ROM, appropriate passive and active ROM,   [   ] other:  diffuse resting tremors  Ext: [  x ]No C/C/E, No calf tenderness,   [   ]other:    Skin: [   ]intact,   [  x ] other:  fascial bruising, b/l orbital ecchymosis, L knee contusion                                                                Neurological Examination:  Cognitive: [    ] AAO x 2 not orientated to time,   [    ]  other:                                                                      Attention:  [ x   ] intact,   [    ]  other:  able to do serial 7s                          Memory: [  x  ] intact,    [    ]  other:     Mood/Affect: [ x   ] wnl,    [    ]  other:                                                                             Communication: [  x  ]Fluent, no dysarthria, following commands:  [    ] other:   CN II - XII:  [ x   ] intact except for decreased L vision,  [    ] other:                                                                                        Motor:   RIGHT UE: [   ] WNL,  [   ] other: Shoulder abduction 5/5, Elbow flexion 5/5, Elbow extension 5/5, Wrist Extension 5/5, Finger abduction 5/5,  4+/5   LEFT    UE: [   ] WNL,  [   ] other: Shoulder abduction 5/5, Elbow flexion 5/5, Elbow extension 5/5, Wrist Extension 5/5, Finger abduction 5/5,  4+/5     RIGHT LE: [   ] WNL,  [   ] other: Hip flexion 4-/5, knee extension 4-/5, plantarflexion 5/5, dorsiflexion 5/5  LEFT    LE: [   ] WNL,  [   ] other: Hip flexion 5/5, 5/5  knee extension/plantarflexion/dorsiflexion     Tone: [   x ] wnl,   [    ]  other:  DTRs: [x   ]symmetric, [   ] other:  Coordination:   [  x  ] intact,   [    ] other:                                                                           Sensory: [  x  ] Intact to light touch,   [    ] other:    MEDICATIONS  (STANDING):  amLODIPine   Tablet 5 milliGRAM(s) Oral daily  aspirin  chewable 81 milliGRAM(s) Oral daily  bacitracin   Ointment 1 Application(s) Topical every 24 hours  chlorhexidine 0.12% Liquid 15 milliLiter(s) Swish and Spit two times a day  chlorhexidine 2% Cloths 1 Application(s) Topical <User Schedule>  chlorhexidine 2% Cloths 1 Application(s) Topical <User Schedule>  clopidogrel Tablet 75 milliGRAM(s) Oral daily  enoxaparin Injectable 30 milliGRAM(s) SubCutaneous every 12 hours  folic acid 1 milliGRAM(s) Oral daily  hydrochlorothiazide 25 milliGRAM(s) Oral daily  losartan 25 milliGRAM(s) Oral daily  metoprolol tartrate 12.5 milliGRAM(s) Oral every 12 hours  multivitamin 1 Tablet(s) Oral daily  polyethylene glycol 3350 17 Gram(s) Oral daily  senna 2 Tablet(s) Oral at bedtime  simvastatin 10 milliGRAM(s) Oral at bedtime  tamsulosin 0.4 milliGRAM(s) Oral at bedtime  thiamine 100 milliGRAM(s) Oral daily  traZODone 50 milliGRAM(s) Oral every 24 hours    MEDICATIONS  (PRN):  aluminum hydroxide/magnesium hydroxide/simethicone Suspension 30 milliLiter(s) Oral every 6 hours PRN Dyspepsia  magnesium hydroxide Suspension 30 milliLiter(s) Oral daily PRN Constipation  oxyCODONE    Solution 5 milliGRAM(s) Oral every 4 hours PRN Severe Pain (7 - 10)        RECENT LABS/IMAGING                          9.8    5.68  )-----------( 369      ( 19 Aug 2024 08:24 )             31.7     08-19    135  |  95<L>  |  15  ----------------------------<  122<H>  4.7   |  30  |  1.0    Ca    8.8      19 Aug 2024 08:24  Mg     2.4     08-19    TPro  6.1  /  Alb  3.7  /  TBili  0.3  /  DBili  x   /  AST  17  /  ALT  23  /  AlkPhos  93  08-19      Urinalysis Basic - ( 19 Aug 2024 08:24 )    Color: x / Appearance: x / SG: x / pH: x  Gluc: 122 mg/dL / Ketone: x  / Bili: x / Urobili: x   Blood: x / Protein: x / Nitrite: x   Leuk Esterase: x / RBC: x / WBC x   Sq Epi: x / Non Sq Epi: x / Bacteria: x Patient is a 79y old  Male who presents with a chief complaint of Rehab for traumatic head trauma sustaining multiple Facial Bone Fracture, [place] Type III, epistaxis S/P Bilateral Internal Maxillary Arteries embolization and ORIF of Facial Bone Fracture, [place] Type III 2/2 mechanical fall (09 Aug 2024 14:51)      HPI:  August 11, 2024 admission    79 y.o. M Moh[place] speaking  with PMH of CAD w/ prior MI s/p CABG (2000), HTN, HLD, MVR, TEO noncompliant with CPAP, recent diagnosis of prostate CA presented on 7/26/2024 to SICU after sustaining injuries from a mechanical fall (+HT, -LOC, -AC). Patient was walking out of his house to see his Cardiologist (Dr. Basurto) when he tripped down his front 2 steps and landed forward on his face onto the concrete.  CT panscan revealed extensive b/l fascial Type III [place] fractures (bilateral facial fx traversing bilateral orbits, nasal bones, nasal septum, maxillary sinus, pterygoid plates and left zygomatic arch) and soft tissue swelling. OMFS consulted. Patient underwent (8/1) ORIF of multiple fascial fractures. ENT consulted for ongoing nasal bleeding s/p rhino rocket. Continues to bleed. Underwent bilateral internal maxillary arteries embolization by neuroendovascular. 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       During patient's hospital course, he developed delirium and violent behavior, code grey called and received Zyprexa and Precedex. Also transfuses 1u PRBC 2/2 acute blood loss anemia. Additionally, patient required tracheostomy (8/1) for airway protection, decannulation 8/8/2024. Labs significant for leukocytosis that continues to downtrend, no active infection identified.  On 8/9/2024, just prior to discharge to acute rehabilitation patient spike a fever of 100.8; therefore admission was postponed. Repeat vitals have been stable, labs reviewed. Patient has not spike a fever in the past 48 hours and is medically stable to admit to acute rehabilitation on 8/11/2024    During their stay, the patient was evaluated by physical and occupational therapy. Prior to admission, patient was independent with ambulation and ADLs. The most recent evaluated functional status is Kevin for bed mobility, modA for transfers, modA for ambulation x30 feet with RW, set up for upper body dressing, and contact guard for lower body dressing. Patient was deemed a good acute rehab candidate due to decline in functional status and ability to carry out activities of daily living. Patient is able to tolerate 3 hours of therapy 5-6 days a week and is motivated to participate.     LS: Lives with his wife in a private house with 5 GERALDINE and 14 flight of stairs to the 2nd floor bedroom.    PLOF: Independent with ADLs and IADLs. Ambulated independently without any AD.        TODAY'S SUBJECTIVE & REVIEW OF SYMPTOMS  Patient was seen and assessed at bedside.   No overnight events.   Patient denies any new complaints at this time.   Tolerating PT/OT.   Tolerating oral diet.   Voiding and passing stool spontaneously.   Vital signs reviewed.  Discharge home today.     CLOF: 100' RW supervision, Transfers SA, UBD set up. LBD PA    Constitutional    [ x  ] WNL           [   ] poor appetite   [   ] insomnia   [   ] tired   Cardio:                [x   ] WNL           [   ] CP   [   ] MCDANIEL   [   ] palpitations               Resp:                   [ x  ] WNL           [   ] SOB   [   ] cough   [   ] wheezing   GI:                        [ x  ] WNL           [   ] constipation   [   ] diarrhea   [   ] abdominal pain   [   ] nausea   [   ] emesis                                :                      [  x ] WNL           [   ] SOLORZANO  [   ] dysuria   [   ] difficulty voiding             Endo:                   [  x ] WNL          [   ] polyuria   [   ] temperature intolerance                 Skin:                     [   ] WNL          [   ] pain   [ x  ] wound   [   ] rash   MSK:                    [x   ] WNL          [   ] muscle pain   [   ] joint pain/ stiffness   [   ] muscle tenderness   [   ] swelling   Neuro:                 [   ] WNL          [   ] HA   [   x] change in vision   [x   ] tremor: improving   [ x  ] weakness   [   ]dysphagia              Cognitive:           [ x  ] WNL           [   ]confusion      Psych:                  [ x  ] WNL           [   ] hallucinations   [   ]agitation   [   ] delusion   [   ]depression    PHYSICAL EXAM  T(C): 37.1 (08-19-24 @ 20:00), Max: 37.1 (08-19-24 @ 20:00)  T(F): 98.7 (08-19-24 @ 20:00), Max: 98.7 (08-19-24 @ 20:00)  HR: 84 (08-20-24 @ 05:30) (77 - 88)  BP: 120/66 (08-20-24 @ 05:30) (112/67 - 133/68)  ABP: --  ABP(mean): --  RR: 18 (08-19-24 @ 20:00) (16 - 18)  SpO2: --    General:[ x  ] NAD, Resting Comfortable,   [   ] other:                                HEENT: [  x ] NC/AT, EOMI, PERRL , [] Normal Conjunctivae,   [   ] other: decreased L with L subconjuctival hemorrhage   Cardio: [  x ] RRR, [x] grade II/VI systolic ejection murmur,   [   ] other:                           Pulm: [ x  ] No Respiratory Distress,  Lungs CTAB,   [   ] other:                       Abdomen: [ x  ]ND/NT, Soft,   [   ] other:    : [ x  ] NO SOLORZANO CATHETER, [   ] SOLORZANO CATHETER- no meatal tear, no discharge, [   ] other:                          MSK: [ x  ] No joint swelling, Full ROM, appropriate passive and active ROM,   [   ] other:  diffuse resting tremors  Ext: [  x ]No C/C/E, No calf tenderness,   [   ]other:    Skin: [   ]intact,   [  x ] other:  fascial bruising, b/l orbital ecchymosis, L knee contusion                                                                Neurological Examination:  Cognitive: [    ] AAO x 2 not orientated to time,   [    ]  other:                                                                      Attention:  [ x   ] intact,   [    ]  other:  able to do serial 7s                          Memory: [  x  ] intact,    [    ]  other:     Mood/Affect: [ x   ] wnl,    [    ]  other:                                                                             Communication: [  x  ]Fluent, no dysarthria, following commands:  [    ] other:   CN II - XII:  [ x   ] intact except for decreased L vision,  [    ] other:                                                                                        Motor:   RIGHT UE: [   ] WNL,  [   ] other: Shoulder abduction 5/5, Elbow flexion 5/5, Elbow extension 5/5, Wrist Extension 5/5, Finger abduction 5/5,  4+/5   LEFT    UE: [   ] WNL,  [   ] other: Shoulder abduction 5/5, Elbow flexion 5/5, Elbow extension 5/5, Wrist Extension 5/5, Finger abduction 5/5,  4+/5     RIGHT LE: [   ] WNL,  [   ] other: Hip flexion 4-/5, knee extension 4-/5, plantarflexion 5/5, dorsiflexion 5/5  LEFT    LE: [   ] WNL,  [   ] other: Hip flexion 5/5, 5/5  knee extension/plantarflexion/dorsiflexion     Tone: [   x ] wnl,   [    ]  other:  DTRs: [x   ]symmetric, [   ] other:  Coordination:   [  x  ] intact,   [    ] other:                                                                           Sensory: [  x  ] Intact to light touch,   [    ] other:    MEDICATIONS  (STANDING):  amLODIPine   Tablet 5 milliGRAM(s) Oral daily  aspirin  chewable 81 milliGRAM(s) Oral daily  bacitracin   Ointment 1 Application(s) Topical every 24 hours  chlorhexidine 0.12% Liquid 15 milliLiter(s) Swish and Spit two times a day  chlorhexidine 2% Cloths 1 Application(s) Topical <User Schedule>  chlorhexidine 2% Cloths 1 Application(s) Topical <User Schedule>  clopidogrel Tablet 75 milliGRAM(s) Oral daily  enoxaparin Injectable 30 milliGRAM(s) SubCutaneous every 12 hours  folic acid 1 milliGRAM(s) Oral daily  hydrochlorothiazide 25 milliGRAM(s) Oral daily  losartan 25 milliGRAM(s) Oral daily  metoprolol tartrate 12.5 milliGRAM(s) Oral every 12 hours  multivitamin 1 Tablet(s) Oral daily  polyethylene glycol 3350 17 Gram(s) Oral daily  senna 2 Tablet(s) Oral at bedtime  simvastatin 10 milliGRAM(s) Oral at bedtime  tamsulosin 0.4 milliGRAM(s) Oral at bedtime  thiamine 100 milliGRAM(s) Oral daily  traZODone 50 milliGRAM(s) Oral every 24 hours    MEDICATIONS  (PRN):  aluminum hydroxide/magnesium hydroxide/simethicone Suspension 30 milliLiter(s) Oral every 6 hours PRN Dyspepsia  magnesium hydroxide Suspension 30 milliLiter(s) Oral daily PRN Constipation  oxyCODONE    Solution 5 milliGRAM(s) Oral every 4 hours PRN Severe Pain (7 - 10)        RECENT LABS/IMAGING                          9.8    5.68  )-----------( 369      ( 19 Aug 2024 08:24 )             31.7     08-19    135  |  95<L>  |  15  ----------------------------<  122<H>  4.7   |  30  |  1.0    Ca    8.8      19 Aug 2024 08:24  Mg     2.4     08-19    TPro  6.1  /  Alb  3.7  /  TBili  0.3  /  DBili  x   /  AST  17  /  ALT  23  /  AlkPhos  93  08-19      Urinalysis Basic - ( 19 Aug 2024 08:24 )    Color: x / Appearance: x / SG: x / pH: x  Gluc: 122 mg/dL / Ketone: x  / Bili: x / Urobili: x   Blood: x / Protein: x / Nitrite: x   Leuk Esterase: x / RBC: x / WBC x   Sq Epi: x / Non Sq Epi: x / Bacteria: x

## 2024-08-20 NOTE — PROGRESS NOTE ADULT - ASSESSMENT
NEUROPSYCHOLOGY FOLLOW UP          Focus: Feedback to family (wife and son)     Pain: Denied Intervention: N/A      Needed: No  : N/A     Appearance: Notable for resolving facial bruising      Arousal Level: Alert and oriented     Behavior: Pleasant and cooperative     Mood/Affect: WFL; calm      Motor: New onset full body tremor     Vision: L visual impairment      Attention: WNL     Insight into illness/deficits: Good     Clinical Summary: The patient, accompanied by his wife and son, was seen on 08/20/2024 to discuss the results, prognosis, and recommendations following neurocognitive testing. Initial testing in English indicated frontal-subcortical dysfunction, findings that were consistent upon follow-up testing in the patient’s native Armenian. The patient exhibited impairments in working memory, verbal learning and retrieval, repetition, abstract reasoning, and judgment. This neurocognitive profile suggests an acute change from baseline, consistent with frontal-subcortical circuitry disruption, commonly associated with traumatic brain injury (TBI).    While both the patient and his wife denied any premorbid cognitive decline, subtle changes may have been present given reduced cortical volume observed on CTH. Although no structural pathology was evident on CTH, TBI can cause metabolic changes in the brain that affect functioning, likely contributing to the patient’s current deficits, particularly considering his initial delirium during the SICU stay. 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    The patient’s wife was provided with psychoeducation on the cognitive consequences of the injury. She was informed that the patient is likely to experience ongoing difficulties with learning and memory retrieval. Recommendations to support the patient and promote cognitive engagement include:    Providing simple instructions, along with repetition and cues as needed.  Encouraging socialization and cognitive stimulation through community interactions.  Supervising instrumental activities of daily living (iADLs), such as managing finances and property.  Monitoring for signs of confusion, agitation, personality changes, or increased forgetfulness, with instructions to follow up with neurology if significant changes occur.  Goal: No additional goals at this time.    Plan: Discharge from neuropsychological services.

## 2024-08-21 LAB
B2 GLYCOPROT1 IGA SER QL: <2 U/ML — SIGNIFICANT CHANGE UP
B2 GLYCOPROT1 IGG SER-ACNC: <1.4 U/ML — SIGNIFICANT CHANGE UP
B2 GLYCOPROT1 IGM SER-ACNC: 5 U/ML — SIGNIFICANT CHANGE UP
CARDIOLIPIN IGM SER-MCNC: 3.7 MPL U/ML — SIGNIFICANT CHANGE UP
CARDIOLIPIN IGM SER-MCNC: <1.6 GPL U/ML — SIGNIFICANT CHANGE UP
DEPRECATED CARDIOLIPIN IGA SER: <2 APL U/ML — SIGNIFICANT CHANGE UP
FOLATE SERPL-MCNC: >20 NG/ML — SIGNIFICANT CHANGE UP
T PALLIDUM AB TITR SER: NEGATIVE — SIGNIFICANT CHANGE UP

## 2024-08-22 ENCOUNTER — NON-APPOINTMENT (OUTPATIENT)
Age: 79
End: 2024-08-22

## 2024-08-22 DIAGNOSIS — G47.33 OBSTRUCTIVE SLEEP APNEA (ADULT) (PEDIATRIC): ICD-10-CM

## 2024-08-22 DIAGNOSIS — I10 ESSENTIAL (PRIMARY) HYPERTENSION: ICD-10-CM
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
## 2024-08-22 DIAGNOSIS — R33.8 OTHER RETENTION OF URINE: ICD-10-CM

## 2024-08-22 DIAGNOSIS — I25.2 OLD MYOCARDIAL INFARCTION: ICD-10-CM

## 2024-08-22 DIAGNOSIS — D62 ACUTE POSTHEMORRHAGIC ANEMIA: ICD-10-CM

## 2024-08-22 DIAGNOSIS — S02.413D: ICD-10-CM

## 2024-08-22 DIAGNOSIS — I34.0 NONRHEUMATIC MITRAL (VALVE) INSUFFICIENCY: ICD-10-CM

## 2024-08-22 DIAGNOSIS — Z79.82 LONG TERM (CURRENT) USE OF ASPIRIN: ICD-10-CM

## 2024-08-22 DIAGNOSIS — H46.9 UNSPECIFIED OPTIC NEURITIS: ICD-10-CM

## 2024-08-22 DIAGNOSIS — Z95.5 PRESENCE OF CORONARY ANGIOPLASTY IMPLANT AND GRAFT: ICD-10-CM

## 2024-08-22 DIAGNOSIS — Z91.198 PATIENT'S NONCOMPLIANCE WITH OTHER MEDICAL TREATMENT AND REGIMEN FOR OTHER REASON: ICD-10-CM

## 2024-08-22 DIAGNOSIS — C61 MALIGNANT NEOPLASM OF PROSTATE: ICD-10-CM
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
## 2024-08-22 DIAGNOSIS — I65.23 OCCLUSION AND STENOSIS OF BILATERAL CAROTID ARTERIES: ICD-10-CM

## 2024-08-22 DIAGNOSIS — R26.89 OTHER ABNORMALITIES OF GAIT AND MOBILITY: ICD-10-CM

## 2024-08-22 DIAGNOSIS — I25.10 ATHEROSCLEROTIC HEART DISEASE OF NATIVE CORONARY ARTERY WITHOUT ANGINA PECTORIS: ICD-10-CM

## 2024-08-22 DIAGNOSIS — E78.5 HYPERLIPIDEMIA, UNSPECIFIED: ICD-10-CM

## 2024-08-22 LAB
ACE SERPL-CCNC: 17 U/L — SIGNIFICANT CHANGE UP (ref 14–82)
ANA PAT FLD IF-IMP: ABNORMAL
ANA TITR SER: ABNORMAL
LEAD BLD-MCNC: <1 UG/DL — SIGNIFICANT CHANGE UP (ref 0–3.4)

## 2024-08-23 ENCOUNTER — APPOINTMENT (OUTPATIENT)
Dept: NEUROSURGERY | Facility: CLINIC | Age: 79
End: 2024-08-23
Payer: MEDICARE

## 2024-08-23 VITALS
SYSTOLIC BLOOD PRESSURE: 124 MMHG | WEIGHT: 165 LBS | BODY MASS INDEX: 25.9 KG/M2 | HEIGHT: 67 IN | DIASTOLIC BLOOD PRESSURE: 70 MMHG

## 2024-08-23 DIAGNOSIS — Z85.9 PERSONAL HISTORY OF MALIGNANT NEOPLASM, UNSPECIFIED: ICD-10-CM

## 2024-08-23 DIAGNOSIS — Z82.49 FAMILY HISTORY OF ISCHEMIC HEART DISEASE AND OTHER DISEASES OF THE CIRCULATORY SYSTEM: ICD-10-CM

## 2024-08-23 DIAGNOSIS — I65.21 OCCLUSION AND STENOSIS OF RIGHT CAROTID ARTERY: ICD-10-CM

## 2024-08-23 LAB
CADMIUM SERPL-MCNC: <0.5 UG/L — SIGNIFICANT CHANGE UP (ref 0–1.2)
MERCURY SERPL-MCNC: 2.9 UG/L — SIGNIFICANT CHANGE UP (ref 0–14.9)

## 2024-08-23 PROCEDURE — 99214 OFFICE O/P EST MOD 30 MIN: CPT

## 2024-08-23 NOTE — END OF VISIT
[FreeTextEntry3] :  I, Dr Townsend personally performed the evaluation and management (E/M) services for this established patient who presents today with (a) new problem(s)/exacerbation of (an) existing condition(s).  That E/M includes conducting the examination, assessing all new/exacerbated conditions, and establishing a new plan of care.  Today, my ALEXANDRO was here to observe my evaluation and management services for this new problem/exacerbated condition to be followed going forward.  This 79-year-old gentleman presents back to the neurosurgery follow-up clinic.  He sustained a mechanical fall approximately 1 month ago which resulted in severe facial fractures as well as partial loss of vision to his left eye.  He underwent embolization of his head and neck region for intractable epistaxis related to his facial fractures prior to surgery.  At the time of the embolization, it was noted that he had left-sided carotid artery occlusion which is something that he was unaware of as well as high-grade stenosis of the right sided carotid artery cervical region.  I discussed with him the options for management of his right-sided carotid artery stenosis in light of the fact that he has contralateral occlusion and relies on his right sided carotid artery anterior circulation for both sides.  I suggested to him that a carotid artery stent would be a good option and we will wait for him to recover a bit more from his trauma before scheduling this.  He is already on dual antiplatelet agents in the form of aspirin and Plavix.  He knows that he may need to switch to Brilinta if he is not therapeutic on his Plavix.

## 2024-08-28 ENCOUNTER — OUTPATIENT (OUTPATIENT)
Dept: OUTPATIENT SERVICES | Facility: HOSPITAL | Age: 79
LOS: 1 days | End: 2024-08-28
Payer: MEDICARE

## 2024-08-28 DIAGNOSIS — I25.810 ATHEROSCLEROSIS OF CORONARY ARTERY BYPASS GRAFT(S) WITHOUT ANGINA PECTORIS: Chronic | ICD-10-CM

## 2024-08-28 DIAGNOSIS — Z98.890 OTHER SPECIFIED POSTPROCEDURAL STATES: Chronic | ICD-10-CM

## 2024-08-28 DIAGNOSIS — H46.9 UNSPECIFIED OPTIC NEURITIS: ICD-10-CM

## 2024-08-28 DIAGNOSIS — R26.9 UNSPECIFIED ABNORMALITIES OF GAIT AND MOBILITY: ICD-10-CM

## 2024-08-28 DIAGNOSIS — I25.701 ATHEROSCLEROSIS OF CORONARY ARTERY BYPASS GRAFT(S), UNSPECIFIED, WITH ANGINA PECTORIS WITH DOCUMENTED SPASM: Chronic | ICD-10-CM

## 2024-08-28 PROCEDURE — 97165 OT EVAL LOW COMPLEX 30 MIN: CPT | Mod: GO

## 2024-08-28 PROCEDURE — 97162 PT EVAL MOD COMPLEX 30 MIN: CPT | Mod: GP

## 2024-08-29 DIAGNOSIS — H46.9 UNSPECIFIED OPTIC NEURITIS: ICD-10-CM

## 2024-08-29 DIAGNOSIS — R26.9 UNSPECIFIED ABNORMALITIES OF GAIT AND MOBILITY: ICD-10-CM

## 2024-09-03 ENCOUNTER — APPOINTMENT (OUTPATIENT)
Dept: OPHTHALMOLOGY | Facility: CLINIC | Age: 79
End: 2024-09-03
Payer: MEDICARE

## 2024-09-03 ENCOUNTER — OUTPATIENT (OUTPATIENT)
Dept: OUTPATIENT SERVICES | Facility: HOSPITAL | Age: 79
LOS: 1 days | End: 2024-09-03
Payer: MEDICARE

## 2024-09-03 DIAGNOSIS — Z98.890 OTHER SPECIFIED POSTPROCEDURAL STATES: Chronic | ICD-10-CM

## 2024-09-03 DIAGNOSIS — I25.701 ATHEROSCLEROSIS OF CORONARY ARTERY BYPASS GRAFT(S), UNSPECIFIED, WITH ANGINA PECTORIS WITH DOCUMENTED SPASM: Chronic | ICD-10-CM

## 2024-09-03 DIAGNOSIS — H53.8 OTHER VISUAL DISTURBANCES: ICD-10-CM

## 2024-09-03 DIAGNOSIS — I25.810 ATHEROSCLEROSIS OF CORONARY ARTERY BYPASS GRAFT(S) WITHOUT ANGINA PECTORIS: Chronic | ICD-10-CM

## 2024-09-03 PROCEDURE — 92134 CPTRZ OPH DX IMG PST SGM RTA: CPT

## 2024-09-03 PROCEDURE — 92004 COMPRE OPH EXAM NEW PT 1/>: CPT

## 2024-09-03 PROCEDURE — 92014 COMPRE OPH EXAM EST PT 1/>: CPT

## 2024-09-03 PROCEDURE — 92134 CPTRZ OPH DX IMG PST SGM RTA: CPT | Mod: 26

## 2024-09-10 ENCOUNTER — OUTPATIENT (OUTPATIENT)
Dept: OUTPATIENT SERVICES | Facility: HOSPITAL | Age: 79
LOS: 1 days | End: 2024-09-10
Payer: MEDICARE

## 2024-09-10 ENCOUNTER — APPOINTMENT (OUTPATIENT)
Dept: OPHTHALMOLOGY | Facility: CLINIC | Age: 79
End: 2024-09-10
Payer: MEDICARE

## 2024-09-10 DIAGNOSIS — I25.701 ATHEROSCLEROSIS OF CORONARY ARTERY BYPASS GRAFT(S), UNSPECIFIED, WITH ANGINA PECTORIS WITH DOCUMENTED SPASM: Chronic | ICD-10-CM

## 2024-09-10 DIAGNOSIS — Z98.890 OTHER SPECIFIED POSTPROCEDURAL STATES: Chronic | ICD-10-CM

## 2024-09-10 DIAGNOSIS — H05.231 HEMORRHAGE OF RIGHT ORBIT: ICD-10-CM

## 2024-09-10 DIAGNOSIS — H43.813 VITREOUS DEGENERATION, BILATERAL: ICD-10-CM

## 2024-09-10 DIAGNOSIS — H46.9 UNSPECIFIED OPTIC NEURITIS: ICD-10-CM

## 2024-09-10 DIAGNOSIS — I25.810 ATHEROSCLEROSIS OF CORONARY ARTERY BYPASS GRAFT(S) WITHOUT ANGINA PECTORIS: Chronic | ICD-10-CM

## 2024-09-10 DIAGNOSIS — H02.232 PARALYTIC LAGOPHTHALMOS RIGHT LOWER EYELID: ICD-10-CM

## 2024-09-10 DIAGNOSIS — H53.8 OTHER VISUAL DISTURBANCES: ICD-10-CM

## 2024-09-10 DIAGNOSIS — R26.9 UNSPECIFIED ABNORMALITIES OF GAIT AND MOBILITY: ICD-10-CM

## 2024-09-10 DIAGNOSIS — H46.8 OTHER OPTIC NEURITIS: ICD-10-CM

## 2024-09-10 PROCEDURE — 99213 OFFICE O/P EST LOW 20 MIN: CPT | Mod: 25

## 2024-09-10 PROCEDURE — 97112 NEUROMUSCULAR REEDUCATION: CPT | Mod: GP

## 2024-09-10 PROCEDURE — 65205 REMOVE FOREIGN BODY FROM EYE: CPT | Mod: LT

## 2024-09-10 PROCEDURE — 97110 THERAPEUTIC EXERCISES: CPT | Mod: GO

## 2024-09-10 PROCEDURE — 99213 OFFICE O/P EST LOW 20 MIN: CPT

## 2024-09-11 DIAGNOSIS — H46.9 UNSPECIFIED OPTIC NEURITIS: ICD-10-CM

## 2024-09-11 DIAGNOSIS — R26.9 UNSPECIFIED ABNORMALITIES OF GAIT AND MOBILITY: ICD-10-CM

## 2024-09-12 ENCOUNTER — OUTPATIENT (OUTPATIENT)
Dept: OUTPATIENT SERVICES | Facility: HOSPITAL | Age: 79
LOS: 1 days | End: 2024-09-12

## 2024-09-12 DIAGNOSIS — R26.9 UNSPECIFIED ABNORMALITIES OF GAIT AND MOBILITY: ICD-10-CM

## 2024-09-12 DIAGNOSIS — H46.9 UNSPECIFIED OPTIC NEURITIS: ICD-10-CM

## 2024-09-12 DIAGNOSIS — I25.810 ATHEROSCLEROSIS OF CORONARY ARTERY BYPASS GRAFT(S) WITHOUT ANGINA PECTORIS: Chronic | ICD-10-CM

## 2024-09-12 DIAGNOSIS — I25.701 ATHEROSCLEROSIS OF CORONARY ARTERY BYPASS GRAFT(S), UNSPECIFIED, WITH ANGINA PECTORIS WITH DOCUMENTED SPASM: Chronic | ICD-10-CM

## 2024-09-12 DIAGNOSIS — Z98.890 OTHER SPECIFIED POSTPROCEDURAL STATES: Chronic | ICD-10-CM

## 2024-09-17 ENCOUNTER — OUTPATIENT (OUTPATIENT)
Dept: OUTPATIENT SERVICES | Facility: HOSPITAL | Age: 79
LOS: 1 days | End: 2024-09-17

## 2024-09-17 DIAGNOSIS — R26.9 UNSPECIFIED ABNORMALITIES OF GAIT AND MOBILITY: ICD-10-CM

## 2024-09-17 DIAGNOSIS — H46.9 UNSPECIFIED OPTIC NEURITIS: ICD-10-CM

## 2024-09-17 DIAGNOSIS — I25.810 ATHEROSCLEROSIS OF CORONARY ARTERY BYPASS GRAFT(S) WITHOUT ANGINA PECTORIS: Chronic | ICD-10-CM

## 2024-09-17 DIAGNOSIS — I25.701 ATHEROSCLEROSIS OF CORONARY ARTERY BYPASS GRAFT(S), UNSPECIFIED, WITH ANGINA PECTORIS WITH DOCUMENTED SPASM: Chronic | ICD-10-CM

## 2024-09-19 ENCOUNTER — OUTPATIENT (OUTPATIENT)
Dept: OUTPATIENT SERVICES | Facility: HOSPITAL | Age: 79
LOS: 1 days | End: 2024-09-19

## 2024-09-19 DIAGNOSIS — H46.9 UNSPECIFIED OPTIC NEURITIS: ICD-10-CM

## 2024-09-19 DIAGNOSIS — Z98.890 OTHER SPECIFIED POSTPROCEDURAL STATES: Chronic | ICD-10-CM

## 2024-09-19 DIAGNOSIS — R26.9 UNSPECIFIED ABNORMALITIES OF GAIT AND MOBILITY: ICD-10-CM

## 2024-09-19 DIAGNOSIS — I25.701 ATHEROSCLEROSIS OF CORONARY ARTERY BYPASS GRAFT(S), UNSPECIFIED, WITH ANGINA PECTORIS WITH DOCUMENTED SPASM: Chronic | ICD-10-CM

## 2024-09-19 DIAGNOSIS — I25.810 ATHEROSCLEROSIS OF CORONARY ARTERY BYPASS GRAFT(S) WITHOUT ANGINA PECTORIS: Chronic | ICD-10-CM

## 2024-09-20 ENCOUNTER — APPOINTMENT (OUTPATIENT)
Dept: CARDIOLOGY | Facility: CLINIC | Age: 79
End: 2024-09-20
Payer: MEDICARE

## 2024-09-20 VITALS
OXYGEN SATURATION: 99 % | WEIGHT: 160 LBS | HEART RATE: 65 BPM | DIASTOLIC BLOOD PRESSURE: 70 MMHG | HEIGHT: 67 IN | SYSTOLIC BLOOD PRESSURE: 122 MMHG | BODY MASS INDEX: 25.11 KG/M2

## 2024-09-20 DIAGNOSIS — I10 ESSENTIAL (PRIMARY) HYPERTENSION: ICD-10-CM

## 2024-09-20 DIAGNOSIS — I34.0 NONRHEUMATIC MITRAL (VALVE) INSUFFICIENCY: ICD-10-CM

## 2024-09-20 DIAGNOSIS — G47.33 OBSTRUCTIVE SLEEP APNEA (ADULT) (PEDIATRIC): ICD-10-CM

## 2024-09-20 DIAGNOSIS — I25.10 ATHEROSCLEROTIC HEART DISEASE OF NATIVE CORONARY ARTERY W/OUT ANGINA PECTORIS: ICD-10-CM

## 2024-09-20 DIAGNOSIS — I35.0 NONRHEUMATIC AORTIC (VALVE) STENOSIS: ICD-10-CM

## 2024-09-20 DIAGNOSIS — I35.1 NONRHEUMATIC AORTIC (VALVE) INSUFFICIENCY: ICD-10-CM

## 2024-09-20 DIAGNOSIS — I20.9 ANGINA PECTORIS, UNSPECIFIED: ICD-10-CM

## 2024-09-20 PROCEDURE — 99214 OFFICE O/P EST MOD 30 MIN: CPT

## 2024-09-20 PROCEDURE — G0008: CPT

## 2024-09-20 PROCEDURE — 90662 IIV NO PRSV INCREASED AG IM: CPT

## 2024-09-20 PROCEDURE — G2211 COMPLEX E/M VISIT ADD ON: CPT

## 2024-09-20 NOTE — DISCUSSION/SUMMARY
[FreeTextEntry1] : The Pt had  CABG 2000. He has TEO not use c pap. He lost 5  Snoring much less. 2/18 stress non diagnostic EKG positive. Chest pain resolved. He is aware he has CAD. Told any more symptoms must go to hospital. . Stent CX 2/20. CABG was LIMA vein diagonal. .  Neg SE 10/23  mod as.  Reviewed AS with patient and family. Questions answered. Told continue Wt loss.  Told keep active . He has prostate ca Kendrick. On hormones. To get radiation. He  fell face 7./24. He bled fx face. . Now can't see one eye. He has occluded carotid . To get stent oter carotid 70%. Will echo. Risk carotid procedure intermediate. Time spent 35 minutes.

## 2024-09-20 NOTE — PHYSICAL EXAM
[Well Developed] : well developed [Well Nourished] : well nourished [Normal Venous Pressure] : normal venous pressure [No Carotid Bruit] : no carotid bruit [Normal Rate] : normal [Rhythm Regular] : regular [Normal S1] : normal S1 [Normal S2] : normal S2 [S3] : no S3 [II] : a grade 2 [S4] : no S4 [Clear Lung Fields] : clear lung fields [Soft] : abdomen soft [Normal Gait] : normal gait [No Edema] : no edema [No Rash] : no rash [Moves all extremities] : moves all extremities

## 2024-09-20 NOTE — HISTORY OF PRESENT ILLNESS
[FreeTextEntry1] : Patient lost 2 llb.. No chest pain. No sob. He gets MCDANIEL.  stable. active.  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 Memory stable.He  fell face 7.24. He bled fx face. . Now can't see one eye. One carotid 100% Other 70% . He needs carotid stent.

## 2024-09-23 DIAGNOSIS — H05.232 HEMORRHAGE OF LEFT ORBIT: ICD-10-CM

## 2024-09-23 DIAGNOSIS — H05.231 HEMORRHAGE OF RIGHT ORBIT: ICD-10-CM

## 2024-09-23 DIAGNOSIS — S04.012A INJURY OF OPTIC NERVE, LEFT EYE, INITIAL ENCOUNTER: ICD-10-CM

## 2024-09-23 DIAGNOSIS — H46.8 OTHER OPTIC NEURITIS: ICD-10-CM

## 2024-09-23 DIAGNOSIS — H43.813 VITREOUS DEGENERATION, BILATERAL: ICD-10-CM

## 2024-09-24 ENCOUNTER — OUTPATIENT (OUTPATIENT)
Dept: OUTPATIENT SERVICES | Facility: HOSPITAL | Age: 79
LOS: 1 days | End: 2024-09-24

## 2024-09-24 DIAGNOSIS — R26.9 UNSPECIFIED ABNORMALITIES OF GAIT AND MOBILITY: ICD-10-CM

## 2024-09-24 DIAGNOSIS — I25.701 ATHEROSCLEROSIS OF CORONARY ARTERY BYPASS GRAFT(S), UNSPECIFIED, WITH ANGINA PECTORIS WITH DOCUMENTED SPASM: Chronic | ICD-10-CM

## 2024-09-24 DIAGNOSIS — I25.810 ATHEROSCLEROSIS OF CORONARY ARTERY BYPASS GRAFT(S) WITHOUT ANGINA PECTORIS: Chronic | ICD-10-CM

## 2024-09-24 DIAGNOSIS — H46.9 UNSPECIFIED OPTIC NEURITIS: ICD-10-CM

## 2024-09-24 DIAGNOSIS — Z98.890 OTHER SPECIFIED POSTPROCEDURAL STATES: Chronic | ICD-10-CM

## 2024-09-25 ENCOUNTER — APPOINTMENT (OUTPATIENT)
Dept: UROLOGY | Facility: CLINIC | Age: 79
End: 2024-09-25
Payer: MEDICARE

## 2024-09-25 DIAGNOSIS — C61 MALIGNANT NEOPLASM OF PROSTATE: ICD-10-CM

## 2024-09-25 DIAGNOSIS — R39.9 UNSPECIFIED SYMPTOMS AND SIGNS INVOLVING THE GENITOURINARY SYSTEM: ICD-10-CM

## 2024-09-25 LAB
BILIRUB UR QL STRIP: NORMAL
COLLECTION METHOD: NORMAL
GLUCOSE UR-MCNC: NORMAL
HCG UR QL: 0.2 EU/DL
HGB UR QL STRIP.AUTO: NORMAL
KETONES UR-MCNC: NORMAL
LEUKOCYTE ESTERASE UR QL STRIP: NORMAL
NITRITE UR QL STRIP: NORMAL
PH UR STRIP: 8.5
PROT UR STRIP-MCNC: NORMAL
SP GR UR STRIP: 1.02

## 2024-09-25 PROCEDURE — 96402 CHEMO HORMON ANTINEOPL SQ/IM: CPT

## 2024-09-25 PROCEDURE — 99214 OFFICE O/P EST MOD 30 MIN: CPT | Mod: 24

## 2024-09-25 NOTE — END OF VISIT
[Time Spent: ___ minutes] : I have spent [unfilled] minutes of time on the encounter which excludes teaching and separately reported services. [4. Prevent psychological harm to patient or others] : Reason - Prevent psychological harm to patient or others [FreeTextEntry3] : Patient notes was transcribed by alicia Wagner  under the supervision of Dr. Marks. And I have   reviewed the patient's chart and agree that it aligns   with  my medical decisions.

## 2024-09-25 NOTE — ASSESSMENT
[FreeTextEntry1] : 1. Prostate CA ,stage pT1C , N0 M0 , GG 4 ,High risk criteria [ HR] ////// Oncotype GPS= 58 2. LUTS -minimal 3.   Plan:  -Discussed how ADT is to be performed for 18 months. Assured patient how he can undergo Eligrad today.  -Instructed the patient and wife the importance of undergoing PSA testing while simultaneously undergoing Eligard injection at the same time.  -Discussed how the patient's most recent falling injury on 07/2024 should not limit his ability to continue SBRT treatment with Dr. Castillo.   -Eligard injection now.  -Eligard injection 3 months after his CEA 11/2024.  -PSA now -PSA 3 months.  -Patient to undergo SBRT with Dr. Castillo. Patient is to call Dr. Castillo's office to notify that he received his second ADT injection today so that patient can proceed  SBRT with Dr. Castillo -Discussed possible side effects of ADT such as night sweats, hot flashes and ED, increase in lipids & triglycerides, cholesterol, diabetes and osteoporosis -Patient and his wife have no other additional questions

## 2024-09-25 NOTE — PHYSICAL EXAM
[General Appearance - In No Acute Distress] : no acute distress [Testes Tenderness] : no tenderness of the testes [Prostate Tenderness] : the prostate was not tender [No Prostate Nodules] : no prostate nodules [Prostate Size ___ gm] : prostate size [unfilled] gm [Normal Station and Gait] : the gait and station were normal for the patient's age [Skin Color & Pigmentation] : normal skin color and pigmentation [] : no rash [No Focal Deficits] : no focal deficits [Oriented To Time, Place, And Person] : oriented to person, place, and time [de-identified] : Nodule palpated on right apex.. hard - previous visit

## 2024-09-25 NOTE — HISTORY OF PRESENT ILLNESS
[Urinary Frequency] : urinary frequency [Nocturia] : nocturia [None] : None [FreeTextEntry1] : 79-year-old who presents to office accompanied by his wife for Prostate CA ,stage T1C, N0 M0, GG 4 ,High risk criteria [ HR]. s/p mpMRI fusion TP prostate biopsy 04/01/2024 with Dr. Lees. He is here for his second ADT injection. Around July of 2024, the patient went to the hospital for 3 weeks after falling down his front door. Wife reported the patient having a broken nose, and face at that time. Left craniotomy  and maxillofacial repair on 07/26/2024. The patient left the hospital on 08/2024. Patient and wife are concerned if they should continue SBRT treatment following his hospital visit. Patient denies undergoing SBRT with Dr. Castillo after hospital visit on 07/2024. The patient and his wife deny undergoing PSA testing prior to today's visit. Patient is able to walk today but he presents with a broken nose. Patient also plans to have a left CEA on 11/2024. Patient last saw Dr. Castillo for RT+ADT consultation on 06/14/2024. He denies any fever, nausea and other constitutional symptoms.    All previous and present data reviewed:  05/2024 PSMA PET scan= abnormal PSMA uptake prostate gland consistent with tumor activity. no other sites of abnormal PSMA uptake.   04/2024 Oncotype GPS= 58  04/01/2024 pathology=1. Prostate, right medial apex, needle biopsy - Adenocarcinoma of the prostate, Prognostic Grade Group 4 (Marley score 4+4=8), involving 95% (9 mm in length) of 1 of 1 core - Lymphovascular invasion present 2. Prostate, right medial base, needle biopsy - Benign prostatic tissue 3. Prostate, right lateral apex, needle biopsy - Adenocarcinoma of the prostate, Prognostic Grade Group 4 (Marley score 4+4=8), involving 60% (4 mm in length) of 1 of 1 core 4. Prostate, right lateral base, needle biopsy - Benign prostatic tissue 5. Prostate, right lateral, needle biopsy - Benign prostatic tissue 6. Prostate, right anterior, needle biopsy - Adenocarcinoma of the prostate, Prognostic Grade Group 2 (Marley score 3+4=7) involving 10% (1 mm in length) of 1 of 1 core - Marley pattern 4 comprises 20% of tumor - Perineural invasion present 7. Prostate, left medial apex, needle biopsy - Adenocarcinoma of the prostate, Prognostic Grade Group 4 ([place] score 4+4=8), involving 10% (1.5 mm in length) of 1 of 1 core - Focus suspicious for lymphovascular invasion 8. Prostate, left medial base, needle biopsy - Benign prostatic tissue 9. Prostate, left lateral apex, needle biopsy - Benign prostatic tissue 10. Prostate, left lateral base, needle biopsy - Benign prostatic tissue 11. Prostate, left lateral, needle biopsy - Benign prostatic tissue 12. Prostate, left anterior, needle biopsy - Benign prostatic tissue 13. Prostate, region of interest, right posterior mid gland, needle biopsy - Adenocarcinoma of the prostate, Prognostic Grade Group 4 ([place] score 4+4=8), involving 60%, 60%, 90% and 85% (6.5 mm, 5 mm, 11 mm and 10.5 mm in length) of 4 of 4 cores - Perineural invasion present - Suspicious for extraprostatic extension 14. Prostate, region of interest, right anterior mid gland, needle biopsy - Adenocarcinoma of the prostate, Prognostic Grade Group 2 (Marley score 3+4=7) involving 100%, 75% and <10% (2, mm, 8 mm and 0.5 mm in length) of 3 of 3 core fragments - Marley pattern 4 comprises 10% of tumor Note: Cribriform [place] pattern 4 and intraductal carcinoma are present in this case.   2021 patient's PSA was 3.59 ng/mL. 1/2024 psa [ pcp] = 9.3 // %fpsa = 6  05/2024 UA= BLO small  06/2024 UA= BLO trace 09/2024 UA = Neg  [Weak Stream] : no weak stream [Hematuria - Gross] : no gross hematuria

## 2024-09-26 ENCOUNTER — OUTPATIENT (OUTPATIENT)
Dept: OUTPATIENT SERVICES | Facility: HOSPITAL | Age: 79
LOS: 1 days | End: 2024-09-26

## 2024-09-26 DIAGNOSIS — I25.701 ATHEROSCLEROSIS OF CORONARY ARTERY BYPASS GRAFT(S), UNSPECIFIED, WITH ANGINA PECTORIS WITH DOCUMENTED SPASM: Chronic | ICD-10-CM

## 2024-09-26 DIAGNOSIS — R26.9 UNSPECIFIED ABNORMALITIES OF GAIT AND MOBILITY: ICD-10-CM

## 2024-09-26 DIAGNOSIS — H46.9 UNSPECIFIED OPTIC NEURITIS: ICD-10-CM

## 2024-09-26 DIAGNOSIS — I25.810 ATHEROSCLEROSIS OF CORONARY ARTERY BYPASS GRAFT(S) WITHOUT ANGINA PECTORIS: Chronic | ICD-10-CM

## 2024-09-26 DIAGNOSIS — Z98.890 OTHER SPECIFIED POSTPROCEDURAL STATES: Chronic | ICD-10-CM

## 2024-09-26 LAB — PSA SERPL-MCNC: 0.66 NG/ML

## 2024-10-01 ENCOUNTER — OUTPATIENT (OUTPATIENT)
Dept: OUTPATIENT SERVICES | Facility: HOSPITAL | Age: 79
LOS: 1 days | Discharge: HOME | End: 2024-10-01
Payer: MEDICARE

## 2024-10-01 DIAGNOSIS — I25.810 ATHEROSCLEROSIS OF CORONARY ARTERY BYPASS GRAFT(S) WITHOUT ANGINA PECTORIS: Chronic | ICD-10-CM

## 2024-10-01 DIAGNOSIS — I25.701 ATHEROSCLEROSIS OF CORONARY ARTERY BYPASS GRAFT(S), UNSPECIFIED, WITH ANGINA PECTORIS WITH DOCUMENTED SPASM: Chronic | ICD-10-CM

## 2024-10-01 DIAGNOSIS — H46.9 UNSPECIFIED OPTIC NEURITIS: ICD-10-CM

## 2024-10-01 DIAGNOSIS — R26.9 UNSPECIFIED ABNORMALITIES OF GAIT AND MOBILITY: ICD-10-CM

## 2024-10-01 PROCEDURE — 97112 NEUROMUSCULAR REEDUCATION: CPT | Mod: GP

## 2024-10-01 PROCEDURE — 97110 THERAPEUTIC EXERCISES: CPT | Mod: GO

## 2024-10-02 DIAGNOSIS — H46.9 UNSPECIFIED OPTIC NEURITIS: ICD-10-CM

## 2024-10-02 DIAGNOSIS — R26.9 UNSPECIFIED ABNORMALITIES OF GAIT AND MOBILITY: ICD-10-CM

## 2024-10-03 ENCOUNTER — OUTPATIENT (OUTPATIENT)
Dept: OUTPATIENT SERVICES | Facility: HOSPITAL | Age: 79
LOS: 1 days | End: 2024-10-03
Payer: MEDICARE

## 2024-10-03 DIAGNOSIS — I25.810 ATHEROSCLEROSIS OF CORONARY ARTERY BYPASS GRAFT(S) WITHOUT ANGINA PECTORIS: Chronic | ICD-10-CM

## 2024-10-03 DIAGNOSIS — I25.701 ATHEROSCLEROSIS OF CORONARY ARTERY BYPASS GRAFT(S), UNSPECIFIED, WITH ANGINA PECTORIS WITH DOCUMENTED SPASM: Chronic | ICD-10-CM

## 2024-10-03 DIAGNOSIS — Z98.890 OTHER SPECIFIED POSTPROCEDURAL STATES: Chronic | ICD-10-CM

## 2024-10-03 PROCEDURE — 77333 RADIATION TREATMENT AID(S): CPT | Mod: 26

## 2024-10-03 PROCEDURE — 77263 THER RADIOLOGY TX PLNG CPLX: CPT

## 2024-10-03 PROCEDURE — 77333 RADIATION TREATMENT AID(S): CPT

## 2024-10-04 ENCOUNTER — NON-APPOINTMENT (OUTPATIENT)
Age: 79
End: 2024-10-04

## 2024-10-07 DIAGNOSIS — C61 MALIGNANT NEOPLASM OF PROSTATE: ICD-10-CM

## 2024-10-08 ENCOUNTER — OUTPATIENT (OUTPATIENT)
Dept: OUTPATIENT SERVICES | Facility: HOSPITAL | Age: 79
LOS: 1 days | Discharge: HOME | End: 2024-10-08

## 2024-10-08 DIAGNOSIS — R26.9 UNSPECIFIED ABNORMALITIES OF GAIT AND MOBILITY: ICD-10-CM

## 2024-10-08 DIAGNOSIS — H46.9 UNSPECIFIED OPTIC NEURITIS: ICD-10-CM

## 2024-10-08 DIAGNOSIS — I25.701 ATHEROSCLEROSIS OF CORONARY ARTERY BYPASS GRAFT(S), UNSPECIFIED, WITH ANGINA PECTORIS WITH DOCUMENTED SPASM: Chronic | ICD-10-CM

## 2024-10-08 DIAGNOSIS — I25.810 ATHEROSCLEROSIS OF CORONARY ARTERY BYPASS GRAFT(S) WITHOUT ANGINA PECTORIS: Chronic | ICD-10-CM

## 2024-10-08 DIAGNOSIS — Z98.890 OTHER SPECIFIED POSTPROCEDURAL STATES: Chronic | ICD-10-CM

## 2024-10-09 ENCOUNTER — OUTPATIENT (OUTPATIENT)
Dept: OUTPATIENT SERVICES | Facility: HOSPITAL | Age: 79
LOS: 1 days | End: 2024-10-09
Payer: MEDICARE

## 2024-10-09 ENCOUNTER — RESULT REVIEW (OUTPATIENT)
Age: 79
End: 2024-10-09

## 2024-10-09 ENCOUNTER — APPOINTMENT (OUTPATIENT)
Dept: CV DIAGNOSITCS | Facility: HOSPITAL | Age: 79
End: 2024-10-09
Payer: MEDICARE

## 2024-10-09 DIAGNOSIS — I35.0 NONRHEUMATIC AORTIC (VALVE) STENOSIS: ICD-10-CM

## 2024-10-09 DIAGNOSIS — I25.810 ATHEROSCLEROSIS OF CORONARY ARTERY BYPASS GRAFT(S) WITHOUT ANGINA PECTORIS: Chronic | ICD-10-CM

## 2024-10-09 DIAGNOSIS — I25.701 ATHEROSCLEROSIS OF CORONARY ARTERY BYPASS GRAFT(S), UNSPECIFIED, WITH ANGINA PECTORIS WITH DOCUMENTED SPASM: Chronic | ICD-10-CM

## 2024-10-09 PROCEDURE — 93306 TTE W/DOPPLER COMPLETE: CPT | Mod: 26

## 2024-10-09 PROCEDURE — 93306 TTE W/DOPPLER COMPLETE: CPT

## 2024-10-10 ENCOUNTER — OUTPATIENT (OUTPATIENT)
Dept: OUTPATIENT SERVICES | Facility: HOSPITAL | Age: 79
LOS: 1 days | End: 2024-10-10
Payer: MEDICARE

## 2024-10-10 ENCOUNTER — OUTPATIENT (OUTPATIENT)
Dept: OUTPATIENT SERVICES | Facility: HOSPITAL | Age: 79
LOS: 1 days | End: 2024-10-10

## 2024-10-10 DIAGNOSIS — I25.701 ATHEROSCLEROSIS OF CORONARY ARTERY BYPASS GRAFT(S), UNSPECIFIED, WITH ANGINA PECTORIS WITH DOCUMENTED SPASM: Chronic | ICD-10-CM

## 2024-10-10 DIAGNOSIS — H46.9 UNSPECIFIED OPTIC NEURITIS: ICD-10-CM

## 2024-10-10 DIAGNOSIS — I25.810 ATHEROSCLEROSIS OF CORONARY ARTERY BYPASS GRAFT(S) WITHOUT ANGINA PECTORIS: Chronic | ICD-10-CM

## 2024-10-10 DIAGNOSIS — I35.0 NONRHEUMATIC AORTIC (VALVE) STENOSIS: ICD-10-CM

## 2024-10-10 DIAGNOSIS — R26.9 UNSPECIFIED ABNORMALITIES OF GAIT AND MOBILITY: ICD-10-CM

## 2024-10-10 DIAGNOSIS — Z98.890 OTHER SPECIFIED POSTPROCEDURAL STATES: Chronic | ICD-10-CM

## 2024-10-10 PROCEDURE — 77338 DESIGN MLC DEVICE FOR IMRT: CPT | Mod: 26

## 2024-10-10 PROCEDURE — 77300 RADIATION THERAPY DOSE PLAN: CPT | Mod: 26

## 2024-10-10 PROCEDURE — 77300 RADIATION THERAPY DOSE PLAN: CPT

## 2024-10-10 PROCEDURE — 77338 DESIGN MLC DEVICE FOR IMRT: CPT

## 2024-10-10 PROCEDURE — 77301 RADIOTHERAPY DOSE PLAN IMRT: CPT

## 2024-10-10 PROCEDURE — 77301 RADIOTHERAPY DOSE PLAN IMRT: CPT | Mod: 26

## 2024-10-15 ENCOUNTER — OUTPATIENT (OUTPATIENT)
Dept: OUTPATIENT SERVICES | Facility: HOSPITAL | Age: 79
LOS: 1 days | End: 2024-10-15

## 2024-10-15 DIAGNOSIS — H46.9 UNSPECIFIED OPTIC NEURITIS: ICD-10-CM

## 2024-10-15 DIAGNOSIS — I25.701 ATHEROSCLEROSIS OF CORONARY ARTERY BYPASS GRAFT(S), UNSPECIFIED, WITH ANGINA PECTORIS WITH DOCUMENTED SPASM: Chronic | ICD-10-CM

## 2024-10-15 DIAGNOSIS — I25.810 ATHEROSCLEROSIS OF CORONARY ARTERY BYPASS GRAFT(S) WITHOUT ANGINA PECTORIS: Chronic | ICD-10-CM

## 2024-10-15 DIAGNOSIS — Z98.890 OTHER SPECIFIED POSTPROCEDURAL STATES: Chronic | ICD-10-CM

## 2024-10-15 DIAGNOSIS — R26.9 UNSPECIFIED ABNORMALITIES OF GAIT AND MOBILITY: ICD-10-CM

## 2024-10-17 ENCOUNTER — OUTPATIENT (OUTPATIENT)
Dept: OUTPATIENT SERVICES | Facility: HOSPITAL | Age: 79
LOS: 1 days | End: 2024-10-17

## 2024-10-17 DIAGNOSIS — R26.9 UNSPECIFIED ABNORMALITIES OF GAIT AND MOBILITY: ICD-10-CM

## 2024-10-17 DIAGNOSIS — H46.9 UNSPECIFIED OPTIC NEURITIS: ICD-10-CM

## 2024-10-17 DIAGNOSIS — I25.701 ATHEROSCLEROSIS OF CORONARY ARTERY BYPASS GRAFT(S), UNSPECIFIED, WITH ANGINA PECTORIS WITH DOCUMENTED SPASM: Chronic | ICD-10-CM

## 2024-10-17 DIAGNOSIS — Z98.890 OTHER SPECIFIED POSTPROCEDURAL STATES: Chronic | ICD-10-CM

## 2024-10-17 DIAGNOSIS — I25.810 ATHEROSCLEROSIS OF CORONARY ARTERY BYPASS GRAFT(S) WITHOUT ANGINA PECTORIS: Chronic | ICD-10-CM

## 2024-10-21 DIAGNOSIS — C61 MALIGNANT NEOPLASM OF PROSTATE: ICD-10-CM

## 2024-10-22 ENCOUNTER — OUTPATIENT (OUTPATIENT)
Dept: OUTPATIENT SERVICES | Facility: HOSPITAL | Age: 79
LOS: 1 days | End: 2024-10-22

## 2024-10-22 DIAGNOSIS — R26.9 UNSPECIFIED ABNORMALITIES OF GAIT AND MOBILITY: ICD-10-CM

## 2024-10-22 DIAGNOSIS — Z98.890 OTHER SPECIFIED POSTPROCEDURAL STATES: Chronic | ICD-10-CM

## 2024-10-22 DIAGNOSIS — H46.9 UNSPECIFIED OPTIC NEURITIS: ICD-10-CM

## 2024-10-22 DIAGNOSIS — I25.810 ATHEROSCLEROSIS OF CORONARY ARTERY BYPASS GRAFT(S) WITHOUT ANGINA PECTORIS: Chronic | ICD-10-CM

## 2024-10-22 DIAGNOSIS — I25.701 ATHEROSCLEROSIS OF CORONARY ARTERY BYPASS GRAFT(S), UNSPECIFIED, WITH ANGINA PECTORIS WITH DOCUMENTED SPASM: Chronic | ICD-10-CM

## 2024-10-24 ENCOUNTER — OUTPATIENT (OUTPATIENT)
Dept: OUTPATIENT SERVICES | Facility: HOSPITAL | Age: 79
LOS: 1 days | End: 2024-10-24

## 2024-10-24 DIAGNOSIS — H46.9 UNSPECIFIED OPTIC NEURITIS: ICD-10-CM

## 2024-10-24 DIAGNOSIS — I25.810 ATHEROSCLEROSIS OF CORONARY ARTERY BYPASS GRAFT(S) WITHOUT ANGINA PECTORIS: Chronic | ICD-10-CM

## 2024-10-24 DIAGNOSIS — Z98.890 OTHER SPECIFIED POSTPROCEDURAL STATES: Chronic | ICD-10-CM

## 2024-10-24 DIAGNOSIS — R26.9 UNSPECIFIED ABNORMALITIES OF GAIT AND MOBILITY: ICD-10-CM

## 2024-10-24 DIAGNOSIS — I25.701 ATHEROSCLEROSIS OF CORONARY ARTERY BYPASS GRAFT(S), UNSPECIFIED, WITH ANGINA PECTORIS WITH DOCUMENTED SPASM: Chronic | ICD-10-CM

## 2024-10-28 ENCOUNTER — OUTPATIENT (OUTPATIENT)
Dept: OUTPATIENT SERVICES | Facility: HOSPITAL | Age: 79
LOS: 1 days | End: 2024-10-28

## 2024-10-28 DIAGNOSIS — I25.810 ATHEROSCLEROSIS OF CORONARY ARTERY BYPASS GRAFT(S) WITHOUT ANGINA PECTORIS: Chronic | ICD-10-CM

## 2024-10-28 DIAGNOSIS — Z98.890 OTHER SPECIFIED POSTPROCEDURAL STATES: Chronic | ICD-10-CM

## 2024-10-28 DIAGNOSIS — I25.701 ATHEROSCLEROSIS OF CORONARY ARTERY BYPASS GRAFT(S), UNSPECIFIED, WITH ANGINA PECTORIS WITH DOCUMENTED SPASM: Chronic | ICD-10-CM

## 2024-10-28 PROCEDURE — 77014: CPT | Mod: 26

## 2024-10-28 PROCEDURE — 77427 RADIATION TX MANAGEMENT X5: CPT

## 2024-10-29 ENCOUNTER — NON-APPOINTMENT (OUTPATIENT)
Age: 79
End: 2024-10-29

## 2024-10-29 ENCOUNTER — OUTPATIENT (OUTPATIENT)
Dept: OUTPATIENT SERVICES | Facility: HOSPITAL | Age: 79
LOS: 1 days | End: 2024-10-29

## 2024-10-29 VITALS
RESPIRATION RATE: 17 BRPM | SYSTOLIC BLOOD PRESSURE: 163 MMHG | OXYGEN SATURATION: 100 % | DIASTOLIC BLOOD PRESSURE: 74 MMHG | TEMPERATURE: 97.2 F | WEIGHT: 169.6 LBS | HEART RATE: 75 BPM | BODY MASS INDEX: 26.56 KG/M2

## 2024-10-29 DIAGNOSIS — C61 MALIGNANT NEOPLASM OF PROSTATE: ICD-10-CM

## 2024-10-29 DIAGNOSIS — H46.9 UNSPECIFIED OPTIC NEURITIS: ICD-10-CM

## 2024-10-29 DIAGNOSIS — I25.810 ATHEROSCLEROSIS OF CORONARY ARTERY BYPASS GRAFT(S) WITHOUT ANGINA PECTORIS: Chronic | ICD-10-CM

## 2024-10-29 DIAGNOSIS — R26.9 UNSPECIFIED ABNORMALITIES OF GAIT AND MOBILITY: ICD-10-CM

## 2024-10-29 DIAGNOSIS — I25.701 ATHEROSCLEROSIS OF CORONARY ARTERY BYPASS GRAFT(S), UNSPECIFIED, WITH ANGINA PECTORIS WITH DOCUMENTED SPASM: Chronic | ICD-10-CM

## 2024-10-29 DIAGNOSIS — Z98.890 OTHER SPECIFIED POSTPROCEDURAL STATES: Chronic | ICD-10-CM

## 2024-10-29 PROCEDURE — 77014: CPT | Mod: 26

## 2024-10-31 ENCOUNTER — OUTPATIENT (OUTPATIENT)
Dept: OUTPATIENT SERVICES | Facility: HOSPITAL | Age: 79
LOS: 1 days | End: 2024-10-31

## 2024-10-31 DIAGNOSIS — C61 MALIGNANT NEOPLASM OF PROSTATE: ICD-10-CM

## 2024-10-31 DIAGNOSIS — I25.701 ATHEROSCLEROSIS OF CORONARY ARTERY BYPASS GRAFT(S), UNSPECIFIED, WITH ANGINA PECTORIS WITH DOCUMENTED SPASM: Chronic | ICD-10-CM

## 2024-10-31 DIAGNOSIS — Z98.890 OTHER SPECIFIED POSTPROCEDURAL STATES: Chronic | ICD-10-CM

## 2024-10-31 DIAGNOSIS — I25.810 ATHEROSCLEROSIS OF CORONARY ARTERY BYPASS GRAFT(S) WITHOUT ANGINA PECTORIS: Chronic | ICD-10-CM

## 2024-10-31 PROCEDURE — 77014: CPT | Mod: 26

## 2024-11-01 ENCOUNTER — OUTPATIENT (OUTPATIENT)
Dept: OUTPATIENT SERVICES | Facility: HOSPITAL | Age: 79
LOS: 1 days | End: 2024-11-01
Payer: MEDICARE

## 2024-11-01 DIAGNOSIS — I25.701 ATHEROSCLEROSIS OF CORONARY ARTERY BYPASS GRAFT(S), UNSPECIFIED, WITH ANGINA PECTORIS WITH DOCUMENTED SPASM: Chronic | ICD-10-CM

## 2024-11-01 DIAGNOSIS — I25.810 ATHEROSCLEROSIS OF CORONARY ARTERY BYPASS GRAFT(S) WITHOUT ANGINA PECTORIS: Chronic | ICD-10-CM

## 2024-11-01 DIAGNOSIS — Z98.890 OTHER SPECIFIED POSTPROCEDURAL STATES: Chronic | ICD-10-CM

## 2024-11-01 PROCEDURE — 77417 THER RADIOLOGY PORT IMAGE(S): CPT

## 2024-11-01 PROCEDURE — 77385: CPT

## 2024-11-04 ENCOUNTER — OUTPATIENT (OUTPATIENT)
Dept: OUTPATIENT SERVICES | Facility: HOSPITAL | Age: 79
LOS: 1 days | End: 2024-11-04

## 2024-11-04 DIAGNOSIS — C61 MALIGNANT NEOPLASM OF PROSTATE: ICD-10-CM

## 2024-11-04 DIAGNOSIS — Z98.890 OTHER SPECIFIED POSTPROCEDURAL STATES: Chronic | ICD-10-CM

## 2024-11-04 DIAGNOSIS — I25.701 ATHEROSCLEROSIS OF CORONARY ARTERY BYPASS GRAFT(S), UNSPECIFIED, WITH ANGINA PECTORIS WITH DOCUMENTED SPASM: Chronic | ICD-10-CM

## 2024-11-04 DIAGNOSIS — I25.810 ATHEROSCLEROSIS OF CORONARY ARTERY BYPASS GRAFT(S) WITHOUT ANGINA PECTORIS: Chronic | ICD-10-CM

## 2024-11-04 PROCEDURE — 77014: CPT | Mod: 26

## 2024-11-04 NOTE — ED ADULT NURSE NOTE - NSICDXPASTSURGICALHX_GEN_ALL_CORE_FT
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

## 2024-11-05 ENCOUNTER — NON-APPOINTMENT (OUTPATIENT)
Age: 79
End: 2024-11-05

## 2024-11-05 ENCOUNTER — OUTPATIENT (OUTPATIENT)
Dept: OUTPATIENT SERVICES | Facility: HOSPITAL | Age: 79
LOS: 1 days | End: 2024-11-05
Payer: MEDICARE

## 2024-11-05 ENCOUNTER — OUTPATIENT (OUTPATIENT)
Dept: OUTPATIENT SERVICES | Facility: HOSPITAL | Age: 79
LOS: 1 days | End: 2024-11-05

## 2024-11-05 VITALS
DIASTOLIC BLOOD PRESSURE: 75 MMHG | SYSTOLIC BLOOD PRESSURE: 127 MMHG | TEMPERATURE: 99 F | HEART RATE: 55 BPM | HEIGHT: 67 IN | WEIGHT: 163.14 LBS | OXYGEN SATURATION: 98 % | RESPIRATION RATE: 16 BRPM

## 2024-11-05 VITALS
WEIGHT: 170.38 LBS | HEART RATE: 81 BPM | SYSTOLIC BLOOD PRESSURE: 177 MMHG | TEMPERATURE: 97.8 F | OXYGEN SATURATION: 99 % | RESPIRATION RATE: 16 BRPM | BODY MASS INDEX: 26.68 KG/M2 | DIASTOLIC BLOOD PRESSURE: 89 MMHG

## 2024-11-05 DIAGNOSIS — Z98.890 OTHER SPECIFIED POSTPROCEDURAL STATES: Chronic | ICD-10-CM

## 2024-11-05 DIAGNOSIS — I65.21 OCCLUSION AND STENOSIS OF RIGHT CAROTID ARTERY: ICD-10-CM

## 2024-11-05 DIAGNOSIS — I25.810 ATHEROSCLEROSIS OF CORONARY ARTERY BYPASS GRAFT(S) WITHOUT ANGINA PECTORIS: Chronic | ICD-10-CM

## 2024-11-05 DIAGNOSIS — H46.9 UNSPECIFIED OPTIC NEURITIS: ICD-10-CM

## 2024-11-05 DIAGNOSIS — I25.701 ATHEROSCLEROSIS OF CORONARY ARTERY BYPASS GRAFT(S), UNSPECIFIED, WITH ANGINA PECTORIS WITH DOCUMENTED SPASM: Chronic | ICD-10-CM

## 2024-11-05 DIAGNOSIS — R26.9 UNSPECIFIED ABNORMALITIES OF GAIT AND MOBILITY: ICD-10-CM

## 2024-11-05 LAB
ALBUMIN SERPL ELPH-MCNC: 4.5 G/DL — SIGNIFICANT CHANGE UP (ref 3.5–5.2)
ALP SERPL-CCNC: 65 U/L — SIGNIFICANT CHANGE UP (ref 30–115)
ALT FLD-CCNC: 18 U/L — SIGNIFICANT CHANGE UP (ref 0–41)
ANION GAP SERPL CALC-SCNC: 15 MMOL/L — HIGH (ref 7–14)
APTT BLD: 29.4 SEC — SIGNIFICANT CHANGE UP (ref 27–39.2)
AST SERPL-CCNC: 18 U/L — SIGNIFICANT CHANGE UP (ref 0–41)
BASOPHILS # BLD AUTO: 0.03 K/UL — SIGNIFICANT CHANGE UP (ref 0–0.2)
BASOPHILS NFR BLD AUTO: 0.5 % — SIGNIFICANT CHANGE UP (ref 0–1)
BILIRUB SERPL-MCNC: 0.3 MG/DL — SIGNIFICANT CHANGE UP (ref 0.2–1.2)
BUN SERPL-MCNC: 25 MG/DL — HIGH (ref 10–20)
CALCIUM SERPL-MCNC: 10 MG/DL — SIGNIFICANT CHANGE UP (ref 8.4–10.5)
CHLORIDE SERPL-SCNC: 98 MMOL/L — SIGNIFICANT CHANGE UP (ref 98–110)
CO2 SERPL-SCNC: 28 MMOL/L — SIGNIFICANT CHANGE UP (ref 17–32)
CREAT SERPL-MCNC: 0.8 MG/DL — SIGNIFICANT CHANGE UP (ref 0.7–1.5)
EGFR: 90 ML/MIN/1.73M2 — SIGNIFICANT CHANGE UP
EOSINOPHIL # BLD AUTO: 0.16 K/UL — SIGNIFICANT CHANGE UP (ref 0–0.7)
EOSINOPHIL NFR BLD AUTO: 2.8 % — SIGNIFICANT CHANGE UP (ref 0–8)
GLUCOSE SERPL-MCNC: 99 MG/DL — SIGNIFICANT CHANGE UP (ref 70–99)
HCT VFR BLD CALC: 36.4 % — LOW (ref 42–52)
HGB BLD-MCNC: 12 G/DL — LOW (ref 14–18)
IMM GRANULOCYTES NFR BLD AUTO: 0.3 % — SIGNIFICANT CHANGE UP (ref 0.1–0.3)
INR BLD: 0.88 RATIO — SIGNIFICANT CHANGE UP (ref 0.65–1.3)
LYMPHOCYTES # BLD AUTO: 1.38 K/UL — SIGNIFICANT CHANGE UP (ref 1.2–3.4)
LYMPHOCYTES # BLD AUTO: 23.9 % — SIGNIFICANT CHANGE UP (ref 20.5–51.1)
MCHC RBC-ENTMCNC: 29.6 PG — SIGNIFICANT CHANGE UP (ref 27–31)
MCHC RBC-ENTMCNC: 33 G/DL — SIGNIFICANT CHANGE UP (ref 32–37)
MCV RBC AUTO: 89.7 FL — SIGNIFICANT CHANGE UP (ref 80–94)
MONOCYTES # BLD AUTO: 0.34 K/UL — SIGNIFICANT CHANGE UP (ref 0.1–0.6)
MONOCYTES NFR BLD AUTO: 5.9 % — SIGNIFICANT CHANGE UP (ref 1.7–9.3)
NEUTROPHILS # BLD AUTO: 3.84 K/UL — SIGNIFICANT CHANGE UP (ref 1.4–6.5)
NEUTROPHILS NFR BLD AUTO: 66.6 % — SIGNIFICANT CHANGE UP (ref 42.2–75.2)
NRBC # BLD: 0 /100 WBCS — SIGNIFICANT CHANGE UP (ref 0–0)
PLATELET # BLD AUTO: 170 K/UL — SIGNIFICANT CHANGE UP (ref 130–400)
PMV BLD: 11.1 FL — HIGH (ref 7.4–10.4)
POTASSIUM SERPL-MCNC: 4.6 MMOL/L — SIGNIFICANT CHANGE UP (ref 3.5–5)
POTASSIUM SERPL-SCNC: 4.6 MMOL/L — SIGNIFICANT CHANGE UP (ref 3.5–5)
PROT SERPL-MCNC: 7 G/DL — SIGNIFICANT CHANGE UP (ref 6–8)
PROTHROM AB SERPL-ACNC: 10.4 SEC — SIGNIFICANT CHANGE UP (ref 9.95–12.87)
RBC # BLD: 4.06 M/UL — LOW (ref 4.7–6.1)
RBC # FLD: 13.3 % — SIGNIFICANT CHANGE UP (ref 11.5–14.5)
SODIUM SERPL-SCNC: 141 MMOL/L — SIGNIFICANT CHANGE UP (ref 135–146)
WBC # BLD: 5.77 K/UL — SIGNIFICANT CHANGE UP (ref 4.8–10.8)
WBC # FLD AUTO: 5.77 K/UL — SIGNIFICANT CHANGE UP (ref 4.8–10.8)

## 2024-11-05 PROCEDURE — 93005 ELECTROCARDIOGRAM TRACING: CPT

## 2024-11-05 PROCEDURE — 85025 COMPLETE CBC W/AUTO DIFF WBC: CPT

## 2024-11-05 PROCEDURE — 36415 COLL VENOUS BLD VENIPUNCTURE: CPT

## 2024-11-05 PROCEDURE — 93010 ELECTROCARDIOGRAM REPORT: CPT

## 2024-11-05 PROCEDURE — 77427 RADIATION TX MANAGEMENT X5: CPT

## 2024-11-05 PROCEDURE — 80053 COMPREHEN METABOLIC PANEL: CPT

## 2024-11-05 PROCEDURE — 85730 THROMBOPLASTIN TIME PARTIAL: CPT

## 2024-11-05 PROCEDURE — 99214 OFFICE O/P EST MOD 30 MIN: CPT | Mod: 25

## 2024-11-05 PROCEDURE — 77014: CPT | Mod: 26

## 2024-11-05 PROCEDURE — 85610 PROTHROMBIN TIME: CPT

## 2024-11-05 PROCEDURE — 97110 THERAPEUTIC EXERCISES: CPT | Mod: GP

## 2024-11-05 NOTE — H&P PST ADULT - MUSCULOSKELETAL
normal/ROM intact/normal gait/strength 5/5 bilateral upper extremities/strength 5/5 bilateral lower extremities normal/ROM intact/normal gait/strength 5/5 bilateral upper extremities/strength 5/5 bilateral lower extremities/back exam

## 2024-11-05 NOTE — H&P PST ADULT - NSICDXPASTMEDICALHX_GEN_ALL_CORE_FT
PAST MEDICAL HISTORY:  Accidental fall     Arteriosclerosis of coronary artery in patient with history of myocardial infarction     Blind left eye     CAD (coronary artery disease)     Elevated PSA     High blood pressure disorder     History of facial injury     Hyperlipidemia     Mitral regurgitation

## 2024-11-05 NOTE — H&P PST ADULT - NSICDXPASTSURGICALHX_GEN_ALL_CORE_FT
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

## 2024-11-05 NOTE — H&P PST ADULT - REASON FOR ADMISSION
Case Type: OP  Suite: Interventional RadiologyProceduralist: Ernst Townsend  Confirmed Surgery Date Time: 11-   PAST Date Time: 11- - 9:30  Procedure: CAROTID STENT  ERP?: No  Laterality: N/A  Length of Procedure: 120 Minutes  Anesthesia Type: Local Standby Case Type: OP  Suite: Interventional Radiology  Proceduralist: Ernst Reid Surgery Date Time: 11-   PAST Date Time: 11- - 9:30  Procedure: CAROTID STENT  ERP?: No  Laterality: N/A  Length of Procedure: 120 Minutes  Anesthesia Type: Local Standby

## 2024-11-05 NOTE — H&P PST ADULT - SKIN
warm and dry/color normal/normal/no rashes/no ulcers warm and dry/color normal/no rashes/no ulcers/scars Trach stoma/warm and dry/color normal/no rashes/no ulcers/scars

## 2024-11-05 NOTE — H&P PST ADULT - HISTORY OF PRESENT ILLNESS
78-year-old male presents to pretesting for the preparations of carotid stent due to 70 % occlusion.   Patient was admitted to [facility] on 07/26/2024 to 08/20/2024 with h/o S/P Face down and extensive facial fractures, S/P ORIF of multiple facial fractures, nasal bleeding and embolization by neuroendovascular, Patient was intubated and tracheid. 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    Patient was diagnosed prostate cancer on 04/2024. Patient is on hormone therapy q 3 months and Radiation every day x 28 days and Today is the fifth day.   Denies any chest pain, difficulty breathing, SOB, palpitations, dysuria, URI, or any other infections in the last 2 weeks/1 month. Denies any recent travel, contact, or exposure to any persons with known or suspected COVID-19. Pt also denies COVID testing within the last 2 weeks. Pt admits to receiving all doses of  COVID vaccine. Denies any suicidal or homicidal ideations.   Pt advised to self quarantine until day of procedure. Exercise tolerance of 4-5 flights of stairs without dyspnea. TEO reviewed with patient. Pt verbalized understanding of all pre-operative instructions.  Anesthesia Alert  NO--Difficult Airway CLASS II  NO--History of neck surgery or radiation  NO--Limited ROM of neck  NO--History of Malignant hyperthermia  NO--No personal or family history of Pseudocholinesterase deficiency.  NO--Prior Anesthesia Complication  NO--Latex Allergy  NO--Loose teeth  NO--History of Rheumatoid Arthritis  YES--TEO on CPAP ( non compliant)   YES--Bleeding Risk on Plavix, Kath  Intubated and tracheid in the past 08/2024  Duke Activity Status Index (DASI) from [email or web address]  on 11/5/2024  ** All calculations should be rechecked by clinician prior to use **  RESULT SUMMARY:  18.95 points  The higher the score (maximum 58.2), the higher the functional status.  5.07 METs  INPUTS:  Take care of self —> 2.75 = Yes  Walk indoors —> 1.75 = Yes  Walk 1&ndash;2 blocks on level ground —> 2.75 = Yes  Climb a flight of stairs or walk up a hill —> 5.5 = Yes  Run a short distance —> 0 = No  Do light work around the house —> 2.7 = Yes  Do moderate work around the house —> 3.5 = Yes  Do heavy work around the house —> 0 = No  Do yard work —> 0 = No  Have sexual relations —> 0 = No  Participate in moderate recreational activities —> 0 = No  Participate in strenuous sports —> 0 = No  Revised Cardiac Risk Index for Pre-Operative Risk from [employer]  on 11/5/2024  ** All calculations should be rechecked by clinician prior to use **  RESULT SUMMARY:  2 points  Class III Risk  10.1 %  30-day risk of death, MI, or cardiac arrest  From [place] 2017. These numbers are higher than those from the original study (Johan 1999). See Evidence for details.  INPUTS:  Elevated-risk surgery —> 1 = Yes  History of ischemic heart disease —> 1 = Yes  History of congestive heart failure —> 0 = No  History of cerebrovascular disease —> 0 = No  Pre-operative treatment with insulin —> 0 = No  Pre-operative creatinine >2 mg/dL / 176.8 µmol/L —> 0 = No    78-year-old male presents to pretesting for the preparations of carotid stent due to 70 % occlusion of the carotid artery.   Patient was admitted to [facility] on 07/26/2024 to 08/20/2024 with h/o S/P Fall by Face down on concrete pavement and extensive Pan facial fractures, Type II Le forte fracture, nasal bone, nasal septum, maxillary sinus, pterygoid plates, left zygomatic arch, B/L facial fracture, Transversal B/L Orbits, nasal bleeding and embolization by neuroendovascular, S/P ORIF and facial surgery done and patient was intubated and tracheid. 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 D/C home after Rehab.     Patient was diagnosed prostate cancer on 04/2024. Patient is on hormone therapy q 3 months and Radiation every day x 28 days and Today is the fifth day.   Denies any chest pain, difficulty breathing, SOB, palpitations, dysuria, URI, or any other infections in the last 2 weeks/1 month. Denies any recent travel, contact, or exposure to any persons with known or suspected COVID-19. Pt also denies COVID testing within the last 2 weeks. Pt admits to receiving all doses of  COVID vaccine. Denies any suicidal or homicidal ideations.   Pt advised to self quarantine until day of procedure. Exercise tolerance of 4-5 flights of stairs without dyspnea. TEO reviewed with patient. Pt verbalized understanding of all pre-operative instructions.  Anesthesia Alert  NO--Difficult Airway CLASS II  NO--History of neck surgery or radiation  NO--Limited ROM of neck  NO--History of Malignant hyperthermia  NO--No personal or family history of Pseudocholinesterase deficiency.  NO--Prior Anesthesia Complication  NO--Latex Allergy  NO--Loose teeth  NO--History of Rheumatoid Arthritis  YES--TEO on CPAP ( non compliant)   YES--Bleeding Risk on Plavix, Kath  Intubated and tracheid in the past 08/2024  Duke Activity Status Index (DASI) from [email or web address]  on 11/5/2024  ** All calculations should be rechecked by clinician prior to use **  RESULT SUMMARY:  18.95 points  The higher the score (maximum 58.2), the higher the functional status.  5.07 METs  INPUTS:  Take care of self —> 2.75 = Yes  Walk indoors —> 1.75 = Yes  Walk 1&ndash;2 blocks on level ground —> 2.75 = Yes  Climb a flight of stairs or walk up a hill —> 5.5 = Yes  Run a short distance —> 0 = No  Do light work around the house —> 2.7 = Yes  Do moderate work around the house —> 3.5 = Yes  Do heavy work around the house —> 0 = No  Do yard work —> 0 = No  Have sexual relations —> 0 = No  Participate in moderate recreational activities —> 0 = No  Participate in strenuous sports —> 0 = No  Revised Cardiac Risk Index for Pre-Operative Risk from [email or web address]  on 11/5/2024  ** All calculations should be rechecked by clinician prior to use **  RESULT SUMMARY:  2 points  Class III Risk  10.1 %  30-day risk of death, MI, or cardiac arrest  From [place] 2017. These numbers are higher than those from the original study (Johan 1999). See Evidence for details.  INPUTS:  Elevated-risk surgery —> 1 = Yes  History of ischemic heart disease —> 1 = Yes  History of congestive heart failure —> 0 = No  History of cerebrovascular disease —> 0 = No  Pre-operative treatment with insulin —> 0 = No  Pre-operative creatinine >2 mg/dL / 176.8 µmol/L —> 0 = No    78-year-old male presents to pretesting for the preparations of carotid stent due to 70 % occlusion of the carotid artery.   Patient was admitted to [facility] on 07/26/2024 to 08/20/2024 with h/o S/P Fall by Face down on concrete pavement and extensive Pan facial fractures, Type II Le forte fracture, nasal bone, nasal septum, maxillary sinus, pterygoid plates, left zygomatic arch, B/L facial fracture, Transversal B/L Orbits, nasal bleeding and embolization by neuroendovascular, S/P ORIF and facial surgery done and patient was intubated and tracheid. 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 D/C home after Rehab.     Patient was diagnosed prostate cancer on 04/2024. Patient is on hormone therapy q 3 months and Radiation every day x 28 days and Today is the fifth day.   Denies any chest pain, difficulty breathing, SOB, palpitations, dysuria, URI, or any other infections in the last 2 weeks/1 month. Denies any recent travel, contact, or exposure to any persons with known or suspected COVID-19. Pt also denies COVID testing within the last 2 weeks. Pt admits to receiving all doses of  COVID vaccine. Denies any suicidal or homicidal ideations.   Pt advised to self quarantine until day of procedure. Exercise tolerance of 4-5 flights of stairs without dyspnea. TEO reviewed with patient. Pt verbalized understanding of all pre-operative instructions.  Anesthesia Alert  NO--Difficult Airway CLASS II  NO--History of neck surgery or radiation  NO--Limited ROM of neck  NO--History of Malignant hyperthermia  NO--No personal or family history of Pseudocholinesterase deficiency.  NO--Prior Anesthesia Complication  NO--Latex Allergy  NO--Loose teeth  NO--History of Rheumatoid Arthritis  YES--TEO on CPAP ( non compliant)   YES--Bleeding Risk on Plavix, Kath ASA  Intubated and tracheid in the past 08/2024  Duke Activity Status Index (DASI) from [email or web address]  on 11/5/2024  ** All calculations should be rechecked by clinician prior to use **  RESULT SUMMARY:  18.95 points  The higher the score (maximum 58.2), the higher the functional status.  5.07 METs  INPUTS:  Take care of self —> 2.75 = Yes  Walk indoors —> 1.75 = Yes  Walk 1&ndash;2 blocks on level ground —> 2.75 = Yes  Climb a flight of stairs or walk up a hill —> 5.5 = Yes  Run a short distance —> 0 = No  Do light work around the house —> 2.7 = Yes  Do moderate work around the house —> 3.5 = Yes  Do heavy work around the house —> 0 = No  Do yard work —> 0 = No  Have sexual relations —> 0 = No  Participate in moderate recreational activities —> 0 = No  Participate in strenuous sports —> 0 = No  Revised Cardiac Risk Index for Pre-Operative Risk from [email or web address]  on 11/5/2024  ** All calculations should be rechecked by clinician prior to use **  RESULT SUMMARY:  2 points  Class III Risk  10.1 %  30-day risk of death, MI, or cardiac arrest  From [place] 2017. These numbers are higher than those from the original study (Johan 1999). See Evidence for details.  INPUTS:  Elevated-risk surgery —> 1 = Yes  History of ischemic heart disease —> 1 = Yes  History of congestive heart failure —> 0 = No  History of cerebrovascular disease —> 0 = No  Pre-operative treatment with insulin —> 0 = No  Pre-operative creatinine >2 mg/dL / 176.8 µmol/L —> 0 = No    78-year-old male presents to pretesting for the preparations of carotid stent due to severe occlusion in the right carotid artery ( 70%). Patient has occlusion also on Left carotid artery.   Patient has a recent h/o he was admitted to [facility] on 07/26/2024 to 08/20/2024 with h/o S/P Fall by Face down on concrete pavement and extensive Pan facial fractures, Type II Le forte fracture, nasal bone, nasal septum, maxillary sinus, pterygoid plates, left zygomatic arch, B/L facial fracture, Transversal B/L Orbits, nasal bleeding and embolization by neuroendovascular, S/P ORIF and facial surgery done and patient was intubated and tracheid. 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 D/C home after Rehab.     Patient was diagnosed prostate cancer on 04/2024. Patient is on hormone therapy q 3 months and Radiation every day x 28 days and Today is the fifth day.   Denies any chest pain, difficulty breathing, SOB, palpitations, dysuria, URI, or any other infections in the last 2 weeks/1 month. Denies any recent travel, contact, or exposure to any persons with known or suspected COVID-19. Pt also denies COVID testing within the last 2 weeks. Pt admits to receiving all doses of  COVID vaccine. Denies any suicidal or homicidal ideations.   Pt advised to self quarantine until day of procedure. Exercise tolerance of 4-5 flights of stairs without dyspnea. TEO reviewed with patient. Pt verbalized understanding of all pre-operative instructions.  Anesthesia Alert  NO--Difficult Airway CLASS II  NO--History of neck surgery or radiation  NO--Limited ROM of neck  NO--History of Malignant hyperthermia  NO--No personal or family history of Pseudocholinesterase deficiency.  NO--Prior Anesthesia Complication  NO--Latex Allergy  NO--Loose teeth  NO--History of Rheumatoid Arthritis  YES--TEO on CPAP ( non compliant)   YES--Bleeding Risk on Plavix, Kath ASA  Intubated and tracheid in the past 08/2024  Duke Activity Status Index (DASI) from [employer]  on 11/5/2024  ** All calculations should be rechecked by clinician prior to use **  RESULT SUMMARY:  18.95 points  The higher the score (maximum 58.2), the higher the functional status.  5.07 METs  INPUTS:  Take care of self —> 2.75 = Yes  Walk indoors —> 1.75 = Yes  Walk 1&ndash;2 blocks on level ground —> 2.75 = Yes  Climb a flight of stairs or walk up a hill —> 5.5 = Yes  Run a short distance —> 0 = No  Do light work around the house —> 2.7 = Yes  Do moderate work around the house —> 3.5 = Yes  Do heavy work around the house —> 0 = No  Do yard work —> 0 = No  Have sexual relations —> 0 = No  Participate in moderate recreational activities —> 0 = No  Participate in strenuous sports —> 0 = No  Revised Cardiac Risk Index for Pre-Operative Risk from [email or web address]  on 11/5/2024  ** All calculations should be rechecked by clinician prior to use **  RESULT SUMMARY:  2 points  Class III Risk  10.1 %  30-day risk of death, MI, or cardiac arrest  From [place] 2017. These numbers are higher than those from the original study (Johan 1999). See Evidence for details.  INPUTS:  Elevated-risk surgery —> 1 = Yes  History of ischemic heart disease —> 1 = Yes  History of congestive heart failure —> 0 = No  History of cerebrovascular disease —> 0 = No  Pre-operative treatment with insulin —> 0 = No  Pre-operative creatinine >2 mg/dL / 176.8 µmol/L —> 0 = No

## 2024-11-06 ENCOUNTER — OUTPATIENT (OUTPATIENT)
Dept: OUTPATIENT SERVICES | Facility: HOSPITAL | Age: 79
LOS: 1 days | End: 2024-11-06

## 2024-11-06 ENCOUNTER — APPOINTMENT (OUTPATIENT)
Dept: NEUROSURGERY | Facility: CLINIC | Age: 79
End: 2024-11-06
Payer: MEDICARE

## 2024-11-06 VITALS
BODY MASS INDEX: 26.68 KG/M2 | SYSTOLIC BLOOD PRESSURE: 125 MMHG | HEIGHT: 67 IN | HEART RATE: 81 BPM | WEIGHT: 170 LBS | DIASTOLIC BLOOD PRESSURE: 70 MMHG

## 2024-11-06 DIAGNOSIS — R26.9 UNSPECIFIED ABNORMALITIES OF GAIT AND MOBILITY: ICD-10-CM

## 2024-11-06 DIAGNOSIS — I25.701 ATHEROSCLEROSIS OF CORONARY ARTERY BYPASS GRAFT(S), UNSPECIFIED, WITH ANGINA PECTORIS WITH DOCUMENTED SPASM: Chronic | ICD-10-CM

## 2024-11-06 DIAGNOSIS — C61 MALIGNANT NEOPLASM OF PROSTATE: ICD-10-CM

## 2024-11-06 DIAGNOSIS — H46.9 UNSPECIFIED OPTIC NEURITIS: ICD-10-CM

## 2024-11-06 DIAGNOSIS — Z98.890 OTHER SPECIFIED POSTPROCEDURAL STATES: Chronic | ICD-10-CM

## 2024-11-06 DIAGNOSIS — I65.21 OCCLUSION AND STENOSIS OF RIGHT CAROTID ARTERY: ICD-10-CM

## 2024-11-06 DIAGNOSIS — I25.810 ATHEROSCLEROSIS OF CORONARY ARTERY BYPASS GRAFT(S) WITHOUT ANGINA PECTORIS: Chronic | ICD-10-CM

## 2024-11-06 PROBLEM — H54.40 BLINDNESS, ONE EYE, UNSPECIFIED EYE: Chronic | Status: ACTIVE | Noted: 2024-11-05
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
## 2024-11-06 PROBLEM — Z87.828 PERSONAL HISTORY OF OTHER (HEALED) PHYSICAL INJURY AND TRAUMA: Chronic | Status: ACTIVE | Noted: 2024-11-05

## 2024-11-06 PROCEDURE — 77014: CPT | Mod: 26

## 2024-11-06 PROCEDURE — 99213 OFFICE O/P EST LOW 20 MIN: CPT

## 2024-11-07 ENCOUNTER — OUTPATIENT (OUTPATIENT)
Dept: OUTPATIENT SERVICES | Facility: HOSPITAL | Age: 79
LOS: 1 days | End: 2024-11-07

## 2024-11-07 DIAGNOSIS — Z98.890 OTHER SPECIFIED POSTPROCEDURAL STATES: Chronic | ICD-10-CM

## 2024-11-07 DIAGNOSIS — H46.9 UNSPECIFIED OPTIC NEURITIS: ICD-10-CM

## 2024-11-07 DIAGNOSIS — R26.9 UNSPECIFIED ABNORMALITIES OF GAIT AND MOBILITY: ICD-10-CM

## 2024-11-07 DIAGNOSIS — I25.810 ATHEROSCLEROSIS OF CORONARY ARTERY BYPASS GRAFT(S) WITHOUT ANGINA PECTORIS: Chronic | ICD-10-CM

## 2024-11-07 DIAGNOSIS — I25.701 ATHEROSCLEROSIS OF CORONARY ARTERY BYPASS GRAFT(S), UNSPECIFIED, WITH ANGINA PECTORIS WITH DOCUMENTED SPASM: Chronic | ICD-10-CM

## 2024-11-07 PROCEDURE — 77014: CPT | Mod: 26

## 2024-11-08 ENCOUNTER — OUTPATIENT (OUTPATIENT)
Dept: OUTPATIENT SERVICES | Facility: HOSPITAL | Age: 79
LOS: 1 days | End: 2024-11-08
Payer: MEDICARE

## 2024-11-08 ENCOUNTER — APPOINTMENT (OUTPATIENT)
Dept: ULTRASOUND IMAGING | Facility: HOSPITAL | Age: 79
End: 2024-11-08
Payer: MEDICARE

## 2024-11-08 ENCOUNTER — OUTPATIENT (OUTPATIENT)
Dept: OUTPATIENT SERVICES | Facility: HOSPITAL | Age: 79
LOS: 1 days | End: 2024-11-08

## 2024-11-08 ENCOUNTER — RESULT REVIEW (OUTPATIENT)
Age: 79
End: 2024-11-08

## 2024-11-08 DIAGNOSIS — Z98.890 OTHER SPECIFIED POSTPROCEDURAL STATES: Chronic | ICD-10-CM

## 2024-11-08 DIAGNOSIS — Z00.8 ENCOUNTER FOR OTHER GENERAL EXAMINATION: ICD-10-CM

## 2024-11-08 DIAGNOSIS — I25.701 ATHEROSCLEROSIS OF CORONARY ARTERY BYPASS GRAFT(S), UNSPECIFIED, WITH ANGINA PECTORIS WITH DOCUMENTED SPASM: Chronic | ICD-10-CM

## 2024-11-08 DIAGNOSIS — C61 MALIGNANT NEOPLASM OF PROSTATE: ICD-10-CM

## 2024-11-08 DIAGNOSIS — I25.810 ATHEROSCLEROSIS OF CORONARY ARTERY BYPASS GRAFT(S) WITHOUT ANGINA PECTORIS: Chronic | ICD-10-CM

## 2024-11-08 DIAGNOSIS — I65.21 OCCLUSION AND STENOSIS OF RIGHT CAROTID ARTERY: ICD-10-CM

## 2024-11-08 PROCEDURE — 93880 EXTRACRANIAL BILAT STUDY: CPT | Mod: 26

## 2024-11-08 PROCEDURE — 93880 EXTRACRANIAL BILAT STUDY: CPT

## 2024-11-09 DIAGNOSIS — I65.21 OCCLUSION AND STENOSIS OF RIGHT CAROTID ARTERY: ICD-10-CM

## 2024-11-11 ENCOUNTER — OUTPATIENT (OUTPATIENT)
Dept: OUTPATIENT SERVICES | Facility: HOSPITAL | Age: 79
LOS: 1 days | End: 2024-11-11

## 2024-11-11 DIAGNOSIS — I25.810 ATHEROSCLEROSIS OF CORONARY ARTERY BYPASS GRAFT(S) WITHOUT ANGINA PECTORIS: Chronic | ICD-10-CM

## 2024-11-11 DIAGNOSIS — Z98.890 OTHER SPECIFIED POSTPROCEDURAL STATES: Chronic | ICD-10-CM

## 2024-11-11 DIAGNOSIS — C61 MALIGNANT NEOPLASM OF PROSTATE: ICD-10-CM

## 2024-11-11 DIAGNOSIS — I25.701 ATHEROSCLEROSIS OF CORONARY ARTERY BYPASS GRAFT(S), UNSPECIFIED, WITH ANGINA PECTORIS WITH DOCUMENTED SPASM: Chronic | ICD-10-CM

## 2024-11-11 PROCEDURE — 77014: CPT | Mod: 26

## 2024-11-12 ENCOUNTER — OUTPATIENT (OUTPATIENT)
Dept: OUTPATIENT SERVICES | Facility: HOSPITAL | Age: 79
LOS: 1 days | End: 2024-11-12

## 2024-11-12 ENCOUNTER — NON-APPOINTMENT (OUTPATIENT)
Age: 79
End: 2024-11-12

## 2024-11-12 VITALS
SYSTOLIC BLOOD PRESSURE: 166 MMHG | RESPIRATION RATE: 16 BRPM | BODY MASS INDEX: 26.63 KG/M2 | WEIGHT: 170 LBS | OXYGEN SATURATION: 99 % | TEMPERATURE: 97.2 F | DIASTOLIC BLOOD PRESSURE: 75 MMHG | HEART RATE: 74 BPM

## 2024-11-12 DIAGNOSIS — Z98.890 OTHER SPECIFIED POSTPROCEDURAL STATES: Chronic | ICD-10-CM

## 2024-11-12 DIAGNOSIS — H46.9 UNSPECIFIED OPTIC NEURITIS: ICD-10-CM

## 2024-11-12 DIAGNOSIS — C61 MALIGNANT NEOPLASM OF PROSTATE: ICD-10-CM

## 2024-11-12 DIAGNOSIS — R26.9 UNSPECIFIED ABNORMALITIES OF GAIT AND MOBILITY: ICD-10-CM

## 2024-11-12 DIAGNOSIS — I25.810 ATHEROSCLEROSIS OF CORONARY ARTERY BYPASS GRAFT(S) WITHOUT ANGINA PECTORIS: Chronic | ICD-10-CM

## 2024-11-12 PROCEDURE — 77427 RADIATION TX MANAGEMENT X5: CPT

## 2024-11-12 PROCEDURE — 77014: CPT | Mod: 26

## 2024-11-13 ENCOUNTER — OUTPATIENT (OUTPATIENT)
Dept: OUTPATIENT SERVICES | Facility: HOSPITAL | Age: 79
LOS: 1 days | End: 2024-11-13

## 2024-11-13 DIAGNOSIS — Z98.890 OTHER SPECIFIED POSTPROCEDURAL STATES: Chronic | ICD-10-CM

## 2024-11-13 DIAGNOSIS — C61 MALIGNANT NEOPLASM OF PROSTATE: ICD-10-CM

## 2024-11-13 DIAGNOSIS — I25.701 ATHEROSCLEROSIS OF CORONARY ARTERY BYPASS GRAFT(S), UNSPECIFIED, WITH ANGINA PECTORIS WITH DOCUMENTED SPASM: Chronic | ICD-10-CM

## 2024-11-13 DIAGNOSIS — I25.810 ATHEROSCLEROSIS OF CORONARY ARTERY BYPASS GRAFT(S) WITHOUT ANGINA PECTORIS: Chronic | ICD-10-CM

## 2024-11-13 PROCEDURE — 77014: CPT | Mod: 26

## 2024-11-14 ENCOUNTER — OUTPATIENT (OUTPATIENT)
Dept: OUTPATIENT SERVICES | Facility: HOSPITAL | Age: 79
LOS: 1 days | End: 2024-11-14

## 2024-11-14 DIAGNOSIS — I25.810 ATHEROSCLEROSIS OF CORONARY ARTERY BYPASS GRAFT(S) WITHOUT ANGINA PECTORIS: Chronic | ICD-10-CM

## 2024-11-14 DIAGNOSIS — Z98.890 OTHER SPECIFIED POSTPROCEDURAL STATES: Chronic | ICD-10-CM

## 2024-11-14 DIAGNOSIS — I25.701 ATHEROSCLEROSIS OF CORONARY ARTERY BYPASS GRAFT(S), UNSPECIFIED, WITH ANGINA PECTORIS WITH DOCUMENTED SPASM: Chronic | ICD-10-CM

## 2024-11-14 PROCEDURE — 77014: CPT | Mod: 26

## 2024-11-15 ENCOUNTER — APPOINTMENT (OUTPATIENT)
Dept: NEUROSURGERY | Facility: CLINIC | Age: 79
End: 2024-11-15

## 2024-11-15 ENCOUNTER — OUTPATIENT (OUTPATIENT)
Dept: OUTPATIENT SERVICES | Facility: HOSPITAL | Age: 79
LOS: 1 days | End: 2024-11-15

## 2024-11-15 VITALS
DIASTOLIC BLOOD PRESSURE: 70 MMHG | BODY MASS INDEX: 26.68 KG/M2 | HEIGHT: 67 IN | WEIGHT: 170 LBS | SYSTOLIC BLOOD PRESSURE: 130 MMHG

## 2024-11-15 DIAGNOSIS — Z98.890 OTHER SPECIFIED POSTPROCEDURAL STATES: Chronic | ICD-10-CM

## 2024-11-15 DIAGNOSIS — I65.21 OCCLUSION AND STENOSIS OF RIGHT CAROTID ARTERY: ICD-10-CM

## 2024-11-15 DIAGNOSIS — I25.810 ATHEROSCLEROSIS OF CORONARY ARTERY BYPASS GRAFT(S) WITHOUT ANGINA PECTORIS: Chronic | ICD-10-CM

## 2024-11-15 DIAGNOSIS — I25.701 ATHEROSCLEROSIS OF CORONARY ARTERY BYPASS GRAFT(S), UNSPECIFIED, WITH ANGINA PECTORIS WITH DOCUMENTED SPASM: Chronic | ICD-10-CM

## 2024-11-15 PROCEDURE — 99213 OFFICE O/P EST LOW 20 MIN: CPT

## 2024-11-15 RX ORDER — TICAGRELOR 90 MG/1
90 TABLET ORAL TWICE DAILY
Qty: 60 | Refills: 3 | Status: ACTIVE | COMMUNITY
Start: 2024-11-15 | End: 1900-01-01

## 2024-11-18 ENCOUNTER — OUTPATIENT (OUTPATIENT)
Dept: OUTPATIENT SERVICES | Facility: HOSPITAL | Age: 79
LOS: 1 days | End: 2024-11-18

## 2024-11-18 DIAGNOSIS — C61 MALIGNANT NEOPLASM OF PROSTATE: ICD-10-CM

## 2024-11-18 DIAGNOSIS — Z98.890 OTHER SPECIFIED POSTPROCEDURAL STATES: Chronic | ICD-10-CM

## 2024-11-18 DIAGNOSIS — I25.810 ATHEROSCLEROSIS OF CORONARY ARTERY BYPASS GRAFT(S) WITHOUT ANGINA PECTORIS: Chronic | ICD-10-CM

## 2024-11-18 DIAGNOSIS — I25.701 ATHEROSCLEROSIS OF CORONARY ARTERY BYPASS GRAFT(S), UNSPECIFIED, WITH ANGINA PECTORIS WITH DOCUMENTED SPASM: Chronic | ICD-10-CM

## 2024-11-18 PROCEDURE — 77014: CPT | Mod: 26

## 2024-11-19 ENCOUNTER — TRANSCRIPTION ENCOUNTER (OUTPATIENT)
Age: 79
End: 2024-11-19

## 2024-11-19 ENCOUNTER — OUTPATIENT (OUTPATIENT)
Dept: OUTPATIENT SERVICES | Facility: HOSPITAL | Age: 79
LOS: 1 days | Discharge: HOME | End: 2024-11-19
Payer: MEDICARE

## 2024-11-19 ENCOUNTER — APPOINTMENT (OUTPATIENT)
Dept: NEUROSURGERY | Facility: HOSPITAL | Age: 79
End: 2024-11-19

## 2024-11-19 VITALS
DIASTOLIC BLOOD PRESSURE: 61 MMHG | SYSTOLIC BLOOD PRESSURE: 133 MMHG | OXYGEN SATURATION: 97 % | HEART RATE: 72 BPM | RESPIRATION RATE: 15 BRPM

## 2024-11-19 VITALS
RESPIRATION RATE: 16 BRPM | WEIGHT: 160.06 LBS | HEIGHT: 67 IN | SYSTOLIC BLOOD PRESSURE: 138 MMHG | OXYGEN SATURATION: 98 % | DIASTOLIC BLOOD PRESSURE: 67 MMHG | TEMPERATURE: 97 F | HEART RATE: 74 BPM

## 2024-11-19 DIAGNOSIS — I25.701 ATHEROSCLEROSIS OF CORONARY ARTERY BYPASS GRAFT(S), UNSPECIFIED, WITH ANGINA PECTORIS WITH DOCUMENTED SPASM: Chronic | ICD-10-CM

## 2024-11-19 DIAGNOSIS — Z98.890 OTHER SPECIFIED POSTPROCEDURAL STATES: Chronic | ICD-10-CM

## 2024-11-19 DIAGNOSIS — I65.21 OCCLUSION AND STENOSIS OF RIGHT CAROTID ARTERY: ICD-10-CM

## 2024-11-19 PROCEDURE — 76937 US GUIDE VASCULAR ACCESS: CPT

## 2024-11-19 PROCEDURE — C1887: CPT

## 2024-11-19 PROCEDURE — C2617: CPT

## 2024-11-19 PROCEDURE — 76937 US GUIDE VASCULAR ACCESS: CPT | Mod: 26

## 2024-11-19 PROCEDURE — 36227 PLACE CATH XTRNL CAROTID: CPT | Mod: RT

## 2024-11-19 PROCEDURE — C1884: CPT

## 2024-11-19 PROCEDURE — 36224 PLACE CATH CAROTD ART: CPT | Mod: RT

## 2024-11-19 PROCEDURE — 36223 PLACE CATH CAROTID/INOM ART: CPT | Mod: RT

## 2024-11-19 PROCEDURE — C1725: CPT

## 2024-11-19 RX ORDER — CHOLECALCIFEROL (VITAMIN D3) 625 MCG
1 CAPSULE ORAL
Refills: 0 | DISCHARGE

## 2024-11-19 RX ORDER — ASCORBIC ACID 500 MG
2 TABLET ORAL
Refills: 0 | DISCHARGE

## 2024-11-19 RX ORDER — EZETIMIBE 10 MG/1
10 TABLET ORAL DAILY
Refills: 0 | Status: DISCONTINUED | OUTPATIENT
Start: 2024-11-19 | End: 2024-11-19

## 2024-11-19 RX ORDER — B-COMPLEX WITH VITAMIN C
1 VIAL (ML) INJECTION
Refills: 0 | DISCHARGE

## 2024-11-19 RX ORDER — B-COMPLEX WITH VITAMIN C
1 VIAL (ML) INJECTION DAILY
Refills: 0 | Status: DISCONTINUED | OUTPATIENT
Start: 2024-11-19 | End: 2024-11-19

## 2024-11-19 RX ORDER — LOSARTAN POTASSIUM 25 MG/1
1 TABLET ORAL
Refills: 0 | DISCHARGE

## 2024-11-19 RX ORDER — CHOLECALCIFEROL (VITAMIN D3) 625 MCG
1000 CAPSULE ORAL DAILY
Refills: 0 | Status: DISCONTINUED | OUTPATIENT
Start: 2024-11-19 | End: 2024-11-19

## 2024-11-19 RX ORDER — ASCORBIC ACID 500 MG
500 TABLET ORAL DAILY
Refills: 0 | Status: DISCONTINUED | OUTPATIENT
Start: 2024-11-19 | End: 2024-11-19

## 2024-11-19 RX ORDER — OMEGA-3/EPA/FISH OIL 910-1300MG
1200 CAPSULE,DELAYED RELEASE (ENTERIC COATED) ORAL
Refills: 0 | DISCHARGE

## 2024-11-19 RX ORDER — SENNA 187 MG
2 TABLET ORAL AT BEDTIME
Refills: 0 | Status: DISCONTINUED | OUTPATIENT
Start: 2024-11-19 | End: 2024-11-19

## 2024-11-19 NOTE — BRIEF OPERATIVE NOTE - COMMENTS
Please follow post NI orders for neuro checks, distal pulses, vitals, and arteriotomy site checks placed for total of 2 hour recovery period following procedure.   Keep HOB elevated, right wrist straight and immobile for x  2 hr    Keep IVF as ordered.   - 150      Patient tolerated procedure well, hemodynamically stable, no change in neurological status compared to baseline.   NIHSS 1   Right  wrist site CDI without evidence of bleeding hematoma oozing ecchymosis swelling at the time of closure. Site nontender to palpation. Temperature and color consistent bilaterally to palpation with intact distal pulses. Small pseudoaneurysm noted preprocedure proximal to right radial site. TR band placed over arteriotomy site and pseudoaneurysm site.     Please notify provider with any signs of bleeding or hematoma at arteriotomy site, change in mental status, vitals outside parameters, or absent distal pulses.   D/C home after 2 hr recovery.   [phone]

## 2024-11-19 NOTE — DISCHARGE NOTE PROVIDER - NSDCMRMEDTOKEN_GEN_ALL_CORE_FT
amLODIPine 5 mg oral tablet: 1 tab(s) orally once a day  aspirin 81 mg oral tablet: 1 tab(s) orally once a day  Co Q-10 100 mg oral capsule: 2 cap(s) orally once a day  ezetimibe-simvastatin 10 mg-10 mg oral tablet: 1 tab(s) orally once a day  folic acid 1 mg oral tablet: 1 tab(s) orally once a day  hydroCHLOROthiazide 25 mg oral tablet: 1 tab(s) orally once a day  losartan 100 mg oral tablet: 1 tab(s) orally once a day  metoprolol succinate 25 mg oral capsule, extended release: 1 cap(s) orally once a day  Move free 1 tab Daily:   Multiple Vitamins oral tablet: 1 tab(s) orally once a day  multivitamin: 1 tab(s) once a day  omega-3 polyunsaturated fatty acids: 1,200 milligram(s) once a day  senna leaf extract oral tablet: 2 tab(s) orally once a day (at bedtime)  thiamine 100 mg oral tablet: 1 tab(s) orally once a day  ticagrelor: 2 times a day  Vitamin C 500 mg oral capsule: 2 cap(s) orally once a day  Vitamin D3 50 mcg (2000 intl units) oral tablet: 1 tab(s) orally once a day

## 2024-11-19 NOTE — ASU DISCHARGE PLAN (ADULT/PEDIATRIC) - D. IF YOU HAD ANY TYPE OF SEDATION, YOU MAY EXPERIENCE LIGHTHEADEDNESS, DIZZINESS, OR SLEEPINESS FOLLOWING YOUR PROCEDURE. A RESPONSIBLE ADULT SHOULD STAY WITH YOU FOR AT LEAST 24 HOURS FOLLOWING YOUR PROCEDURE.
HPI:     57 y/o male morbidly obese male w/ PMHx of Hepatic Cirrhosis, ascites, Thrombocytopenia, CHF, HTN, HLD, recent admx for Hepatic Encephalopathy, noncompliant with lactulose presents to the ED due to AMS. Pt awake, lethargic only oriented to self, unable to provide any history. History obtained from ED/Chart. Per ED, Brother noticed worsening confusion x 1, noted again pt non-complaint with lactulose thus gave him 1 dose prior to his arrival in ED.    On arrival in ED, initial /59, , pt afebrile. CT head neg. Sig labs include WBC 12.37, h/h 7.5/24.3, PLT 89, LA 8, BUN/Cr 48/1.81, ammonia level 41, see below for rest of sig labs. Pt s/p paracentesis in ED, 2L removed cell ct not indicative of SBP. UA neg, CXR pre-valenzuela unremarkable. CT A/P showed portal hypertension with cirrhosis, splenomegaly, upper abdominal varices, moderate abdominopelvic ascites, pleural effusions, and anasarca.     (14 Oct 2020 03:19)  ----------------- As Above -----------------Patient seen earlier today.   The patient presents with a change in mental status. Patient has a history of Paulino and alcoholic cirrhosis. Patient is non complaint with Lactulose. Patient has had admissions previously for hepatic encephalopathy from non compliance with Lactulose. Patiet was found to have HGB of 7.5 with platelets of 89. INR > 2  The patient presents with severe lethargy. Ammonia only 41 but has elevated lactic acid levels. SBP in ER ruled out SBP  IN ER , patient was passing light brown liquid stool ( visualized )  ---- Patient later developed melena ( WNI ) HGB fell to 5.6, BUN increased to 56 and platelets fell to 45. Patient now in the ICU    Change in MS - Ammonia 41 and was given Lactulose . Patient probably has underlying sepsis Appreciate ID consult.   Melena - elevated INR, low platelets - Patient is not a candidate for for immediate  EGD ( high INR, low platelets ) 1) Octreotide 2) Platelets 3) Keep HGB > 8 4) ICU setting 5) Ceftriaxone 6) NPO Statement Selected

## 2024-11-19 NOTE — DISCHARGE NOTE PROVIDER - NSDCCPTREATMENT_GEN_ALL_CORE_FT
PRINCIPAL PROCEDURE  Procedure: Angiogram, carotid and cerebral, right  Findings and Treatment:

## 2024-11-19 NOTE — PRE PROCEDURE NOTE - PRE PROCEDURE EVALUATION
HPI:  Patient is a 79 year old male, history of CAD s/p CABG (2000), HTN, HLD, recent diagnosis of prostate cancer, who presented s/p mechanical fall with facial trauma in August 2024, s/p diagnostic cerebral angiogram and bilateral IMAX embolization with embosphere particles and coils, s/p tracheostomy now d/c. Patient was also found with left sided carotid occlusion and right carotid stenosis. Carotid ultrasound 11/2024 reporting 50-79% stenosis. Patient is on aspirin and brilinta, both taken this AM. Now presenting for diagnostic cerebral angiogram and possible carotid angioplasty/stenting. Without complaints. NIHSS1 for left eye lower quadrantanopia.  used for translation.  (19 Nov 2024 14:28)      NPO except meds since midnight.   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   Patient presents to neuro-IR for diagnostic cerebral angiogram and planned carotid angioplasty/stenting.   Procedure, goals, risks, benefits and alternatives  were discussed with patient and patient's family.  All questions were answered to best understanding.   Risks discussed include but are not limited to stroke, vessel injury, hemorrhage, and or arteriotomy site hematoma.   Patient / proxy demonstrates understanding  of all risks involved with this procedure and wishes to continue.     Appropriate consent was obtained and placed in the patient's chart.   [phone]

## 2024-11-19 NOTE — ASU PATIENT PROFILE, ADULT - FALL HARM RISK - RISK INTERVENTIONS

## 2024-11-19 NOTE — ASU DISCHARGE PLAN (ADULT/PEDIATRIC) - ASU DC SPECIAL INSTRUCTIONSFT
Patient was discharged post procedure without eventful recovery period.   Patient will follow with Dr. Townsend in 1-2 weeks post procedure to discuss results and plan for treatment.     Discharge instructions were given to the patient in the presence of nursing staff.     Puncture site precautions: Ok to shower and remove dressing 24 hrs post procedure and leave it open to air (do not apply any lotion / ointments to the site). It is preferable to shower and avoid soaking the puncture site in bath / swimming pool / hot tub for 3 days following the procedure.   Groin puncture site - no driving for 36 hr post procedure cleared to resume driving thereafter, no lifting greater than 10 lbs for about 3 days post procedure cleared to resume after.   Wrist puncture site - cleared to drive, do not lift anything greater than 10lbs for 1 week in the arm that was accessed.     What to expect:   It is normal to experience some mild/mod discomfort at the puncture site that usually settles within 24-48 hr after the procedure as well as some superficial brising and skin discoloration. It is also common to experience a mild self resolvign headache during this time period that responds to tylenol / over the counter meds.     When to call the office / seek medical attention:   Bleeding - at the groin site, apply continuous manual pressure and seek medical attention   Swelling - or hard bump larger than the size of a nickel / small tangerine develops at the puncture site.   Discoloration / cold / numbness / tingling - in foot / leg or hand   Fever > 101.5F   Pain - especially if sudden or feeling of giving way at the puncture site   Symptoms of stroke - visual disturbances, face arm leg weakness, confusion, speech disturbance, drowsiness, persistent headache.     Office #[phone] Patient was discharged post procedure without eventful recovery period.   Patient will follow with Dr. Townsend in 1-2 weeks post procedure to discuss results and plan for treatment.     Discharge instructions were given to the patient in the presence of nursing staff.     Puncture site precautions: Ok to shower and remove dressing 24 hrs post procedure and leave it open to air (do not apply any lotion / ointments to the site). It is preferable to shower and avoid soaking the puncture site in bath / swimming pool / hot tub for 3 days following the procedure.   Groin puncture site - no driving for 36 hr post procedure cleared to resume driving thereafter, no lifting greater than 10 lbs for about 3 days post procedure cleared to resume after.   Wrist puncture site - cleared to drive, do not lift anything greater than 10lbs for 1 week in the arm that was accessed.     What to expect:   It is normal to experience some mild/mod discomfort at the puncture site that usually settles within 24-48 hr after the procedure as well as some superficial brising and skin discoloration. It is also common to experience a mild self resolvign headache during this time period that responds to tylenol / over the counter meds.     When to call the office / seek medical attention:   Bleeding - at the groin site, apply continuous manual pressure and seek medical attention   Swelling - or hard bump larger than the size of a nickel / small tangerine develops at the puncture site.   Discoloration / cold / numbness / tingling - in foot / leg or hand   Fever > 101.5F   Pain - especially if sudden or feeling of giving way at the puncture site   Symptoms of stroke - visual disturbances, face arm leg weakness, confusion, speech disturbance, drowsiness, persistent headache.     Office #[phone]    Patient is cleared from a neuroendovascular standpoint to switch brilinta back to plavix as of tomorrow.

## 2024-11-19 NOTE — PRE-ANESTHESIA EVALUATION ADULT - NSDENTALSD_ENT_ALL_CORE
Problem: Safety - Adult  Goal: Free from fall injury  Outcome: Progressing  Flowsheets (Taken 12/19/2023 0028)  Free From Fall Injury: Instruct family/caregiver on patient safety     Problem: ABCDS Injury Assessment  Goal: Absence of physical injury  Outcome: Progressing  Flowsheets (Taken 12/19/2023 0028)  Absence of Physical Injury: Implement safety measures based on patient assessment     Problem: Cardiovascular - Adult  Goal: Maintains optimal cardiac output and hemodynamic stability  Outcome: Progressing  Flowsheets (Taken 12/19/2023 0028)  Maintains optimal cardiac output and hemodynamic stability: Monitor blood pressure and heart rate appears normal and intact

## 2024-11-19 NOTE — H&P ADULT - ASSESSMENT
Patient is a 79 year old male, history of CAD s/p CABG (2000), HTN, HLD, recent diagnosis of prostate cancer, who presented s/p mechanical fall with facial trauma in August 2024, s/p diagnostic cerebral angiogram and bilateral IMAX embolization with embosphere particles and coils, s/p tracheostomy now d/c. Patient was also found with left sided carotid occlusion and right carotid stenosis. Carotid ultrasound 11/2024 reporting 50-79% stenosis. Patient is on aspirin and brilinta, both taken this AM. Now presenting for diagnostic cerebral angiogram and possible carotid angioplasty/stenting.     Plan:  #Neuro:  - NIHSS q 15 min x 2hr q 30 min x 6 hr q 1 hr x 16 hr post procedure   - Continue ASA 81mg PO q24hrs - last dose 11/19  - Continue Brilinta 90 mg PO Q12hrs - last dose 11/19   - Continue Atorvastatin 80mg PO q24hrs HS  - PT/OT  - Stat CTH if any worsening or deterioration in neurological examination and call [facility]/Neurology    #CV:  - Keep 110-140   - Home medications: Hydrochlorothiazide 25 mg ; Losartan 1000 mg ; metoprolol 25 mg ; Amlodipine 5mg - held pre-procedure last dose 11/18   - Home ezetimibe -simvastatin     #Resp:  - HOB > 35 degrees  - Aspiration precautions  - Keep SaO2 > 95%    #Renal/Fluid/Electrolytes:  - Keep euvolemic  - Keep normonatremic   - Keep Magnesium level > 2  - Monitor lytes, replete as needed    #GI:  - Dysphagia screening  - DASH diet     #Heme/Onc:  - SCS while in bed    #Endo:  - FS qAC/HS    #ID:  - Keep normothermic; avoid fevers  - Continue Tylenol 650mg PO q6hrs prn for temp > 38C    Code status: Full code  Disposition: ICU    Comanagement and admission coordinated with neuro ICU team.

## 2024-11-19 NOTE — ASU DISCHARGE PLAN (ADULT/PEDIATRIC) - CARE PROVIDER_API CALL
Ernst Townsend  Neurosurgery  [address] [place] [address], [address]  [place] [address]  Phone: [phone]  Fax: [phone]  Follow Up Time:

## 2024-11-19 NOTE — H&P ADULT - NSHPPOAPULMEMBOLUS_GEN_A_CORE
no
Enoxaparin/Lovenox increases your risk for bleeding. Notify your doctor if you experience any of the following side effects: unusual bleeding or bruising, coughing up or vomiting blood, red or black stool, blood in your urine, itching or hives, chest tightness, chest pain, shortness of breath, trouble breathing, swelling in your face or hands, swelling in your mouth or throat, fever, large flat blue or purplish patches on the skin, numbness or weakness in your arm or leg on one side, pain in your lower leg, sudden or severe headache with vision, speech or walking problems. When Enoxaparin/Lovenox is taken with other medicines, they can affect how it works. Taking other medications such as aspirin, blood thinners, and nonsteroidal anti-inflammatories increases your risk of bleeding. It is very important to tell your health care provider about all of the other medicines, including over-the-counter medications, herbs, and vitamins you are taking. DO NOT start, stop, or change the dosage of any medicine, including over-the-counter medicines, vitamins, and herbal products without your doctor’s approval. Any products containing aspirin or are nonsteroidal anti-inflammatories lessen the blood’s ability to form clots and adds to the effect of Enoxaparin/Lovenox. Never take aspirin or medicines that contain aspirin without speaking to your doctor.

## 2024-11-19 NOTE — DISCHARGE NOTE PROVIDER - HOSPITAL COURSE
Patient is a 79 year old male, history of CAD s/p CABG (2000), HTN, HLD, recent diagnosis of prostate cancer, who presented s/p mechanical fall with facial trauma in August 2024, s/p diagnostic cerebral angiogram and bilateral IMAX embolization with embosphere particles and coils, s/p tracheostomy now d/c. Patient was also found with left sided carotid occlusion and right carotid stenosis. Carotid ultrasound 11/2024 reporting 50-79% stenosis. Patient is on aspirin and brilinta, both taken this AM. Now presenting for diagnostic cerebral angiogram and possible carotid angioplasty/stenting.   Intraop it was noted that patient had only < 50% stenosis of the right carotid bulb, thus not indicating need for carotid stent or angioplasty.   Patient was cleared for d/c after two hour recovery to follow up outpatient with Dr Townsend in 2 weeks.    Patient is a 79 year old male, history of CAD s/p CABG (2000), HTN, HLD, recent diagnosis of prostate cancer, who presented s/p mechanical fall with facial trauma in August 2024, s/p diagnostic cerebral angiogram and bilateral IMAX embolization with embosphere particles and coils, s/p tracheostomy now d/c. Patient was also found with left sided carotid occlusion and right carotid stenosis. Carotid ultrasound 11/2024 reporting 50-79% stenosis. Patient is on aspirin and brilinta, both taken this AM. Now presenting for diagnostic cerebral angiogram and possible carotid angioplasty/stenting.   Intraop it was noted that patient had only < 50% stenosis of the right carotid bulb, thus not indicating need for carotid stent or angioplasty.   Patient was cleared for d/c after two hour recovery to follow up outpatient with Dr Townsend in 2 weeks.   Patient is cleared from a neuroendovascular standpoint to switch brilinta back to plavix as of tomorrow.

## 2024-11-19 NOTE — DISCHARGE NOTE PROVIDER - CARE PROVIDER_API CALL
Ernst Townsend  Neurosurgery  [address] [place] [address], [address]  [place] [address]  Phone: [phone]  Fax: [phone]  Follow Up Time:

## 2024-11-19 NOTE — H&P ADULT - HISTORY OF PRESENT ILLNESS
Patient is a 79 year old male, history of CAD s/p CABG (2000), HTN, HLD, recent diagnosis of prostate cancer, who presented s/p mechanical fall with facial trauma in August 2024, s/p diagnostic cerebral angiogram and bilateral IMAX embolization with embosphere particles and coils, s/p tracheostomy now d/c. Patient was also found with left sided carotid occlusion and right carotid stenosis. Carotid ultrasound 11/2024 reporting 50-79% stenosis. Patient is on aspirin and brilinta, both taken this AM. Now presenting for diagnostic cerebral angiogram and possible carotid angioplasty/stenting. Without complaints. NIHSS1 for left eye lower quadrantanopia.  used for translation.

## 2024-11-19 NOTE — BRIEF OPERATIVE NOTE - OPERATION/FINDINGS
Procedure : Diagnostic cerebral angiogram    Contrast: Visipaque 320   IA medications: Heparin 3000 IU, Verapamil 2.5mg, Nitroglycerin 200 mcg   Implants placed: n/a  Complications : n/a    Preliminary Report:  Selective diagnostic angiogram performed from right radial arteriotomy site with preliminary findings of < 50% carotid stenosis of the right carotid bulb.     Official IR neuro procedure note to follow.

## 2024-11-19 NOTE — H&P ADULT - NSHPPHYSICALEXAM_GEN_ALL_CORE
T(C): 36.2 (11-19-24 @ 12:25), Max: 36.2 (11-19-24 @ 12:25)  HR: 74 (11-19-24 @ 12:25) (74 - 74)  BP: 138/67 (11-19-24 @ 12:25) (138/67 - 138/67)  RR: 16 (11-19-24 @ 12:25) (16 - 16)  SpO2: 98% (11-19-24 @ 12:25) (98% - 98%)    CONSTITUTIONAL: Well groomed, no apparent distress  EYES: PERRLA and symmetric, EOMI, Left eye lower quadrantanopia   RESP: No respiratory distress, no use of accessory muscles  CV: RRR  MSK: Moves all extremities to antigravity without drift, full ROM   NEURO: CN III-XII intact; Sensation intact, no neglect, no aphasia, no dysarthria, no dysmetria on finger to nose   PSYCH: Appropriate insight/judgment; A+O x 3, mood and affect appropriate, recent/remote memory intact

## 2024-11-19 NOTE — DISCHARGE NOTE PROVIDER - NSDCFUADDINST_GEN_ALL_CORE_FT
Patient was discharged post procedure without eventful recovery period.   Patient will follow with Dr. Townsend in 1-2 weeks post procedure to discuss results and plan for treatment.     Discharge instructions were given to the patient in the presence of nursing staff.     Puncture site precautions: Ok to shower and remove dressing 24 hrs post procedure and leave it open to air (do not apply any lotion / ointments to the site). It is preferable to shower and avoid soaking the puncture site in bath / swimming pool / hot tub for 3 days following the procedure.   Groin puncture site - no driving for 36 hr post procedure cleared to resume driving thereafter, no lifting greater than 10 lbs for about 3 days post procedure cleared to resume after.   Wrist puncture site - cleared to drive, do not lift anything greater than 10lbs for 1 week in the arm that was accessed.     What to expect:   It is normal to experience some mild/mod discomfort at the puncture site that usually settles within 24-48 hr after the procedure as well as some superficial brising and skin discoloration. It is also common to experience a mild self resolvign headache during this time period that responds to tylenol / over the counter meds.     When to call the office / seek medical attention:   Bleeding - at the groin site, apply continuous manual pressure and seek medical attention   Swelling - or hard bump larger than the size of a nickel / small tangerine develops at the puncture site.   Discoloration / cold / numbness / tingling - in foot / leg or hand   Fever > 101.5F   Pain - especially if sudden or feeling of giving way at the puncture site   Symptoms of stroke - visual disturbances, face arm leg weakness, confusion, speech disturbance, drowsiness, persistent headache.     Office #[id number]

## 2024-11-19 NOTE — ASU DISCHARGE PLAN (ADULT/PEDIATRIC) - FINANCIAL ASSISTANCE
[facility] provides services at a reduced cost to those who are determined to be eligible through [facility]’s financial assistance program. Information regarding [facility]’s financial assistance program can be found by going to [email or web address] or by calling [phone].

## 2024-11-20 DIAGNOSIS — C61 MALIGNANT NEOPLASM OF PROSTATE: ICD-10-CM

## 2024-11-21 ENCOUNTER — OUTPATIENT (OUTPATIENT)
Dept: OUTPATIENT SERVICES | Facility: HOSPITAL | Age: 79
LOS: 1 days | End: 2024-11-21

## 2024-11-21 DIAGNOSIS — I25.810 ATHEROSCLEROSIS OF CORONARY ARTERY BYPASS GRAFT(S) WITHOUT ANGINA PECTORIS: Chronic | ICD-10-CM

## 2024-11-21 DIAGNOSIS — Z98.890 OTHER SPECIFIED POSTPROCEDURAL STATES: Chronic | ICD-10-CM

## 2024-11-21 DIAGNOSIS — I25.701 ATHEROSCLEROSIS OF CORONARY ARTERY BYPASS GRAFT(S), UNSPECIFIED, WITH ANGINA PECTORIS WITH DOCUMENTED SPASM: Chronic | ICD-10-CM

## 2024-11-21 PROCEDURE — 77014: CPT | Mod: 26

## 2024-11-21 PROCEDURE — 77427 RADIATION TX MANAGEMENT X5: CPT

## 2024-11-22 ENCOUNTER — OUTPATIENT (OUTPATIENT)
Dept: OUTPATIENT SERVICES | Facility: HOSPITAL | Age: 79
LOS: 1 days | End: 2024-11-22

## 2024-11-22 DIAGNOSIS — Z98.890 OTHER SPECIFIED POSTPROCEDURAL STATES: Chronic | ICD-10-CM

## 2024-11-22 DIAGNOSIS — I25.810 ATHEROSCLEROSIS OF CORONARY ARTERY BYPASS GRAFT(S) WITHOUT ANGINA PECTORIS: Chronic | ICD-10-CM

## 2024-11-22 DIAGNOSIS — I25.701 ATHEROSCLEROSIS OF CORONARY ARTERY BYPASS GRAFT(S), UNSPECIFIED, WITH ANGINA PECTORIS WITH DOCUMENTED SPASM: Chronic | ICD-10-CM

## 2024-11-22 DIAGNOSIS — C61 MALIGNANT NEOPLASM OF PROSTATE: ICD-10-CM

## 2024-11-24 ENCOUNTER — OUTPATIENT (OUTPATIENT)
Dept: OUTPATIENT SERVICES | Facility: HOSPITAL | Age: 79
LOS: 1 days | End: 2024-11-24

## 2024-11-24 DIAGNOSIS — I25.810 ATHEROSCLEROSIS OF CORONARY ARTERY BYPASS GRAFT(S) WITHOUT ANGINA PECTORIS: Chronic | ICD-10-CM

## 2024-11-24 DIAGNOSIS — Z98.890 OTHER SPECIFIED POSTPROCEDURAL STATES: Chronic | ICD-10-CM

## 2024-11-24 DIAGNOSIS — C61 MALIGNANT NEOPLASM OF PROSTATE: ICD-10-CM

## 2024-11-24 DIAGNOSIS — I25.701 ATHEROSCLEROSIS OF CORONARY ARTERY BYPASS GRAFT(S), UNSPECIFIED, WITH ANGINA PECTORIS WITH DOCUMENTED SPASM: Chronic | ICD-10-CM

## 2024-11-24 PROCEDURE — 77014: CPT | Mod: 26

## 2024-11-25 ENCOUNTER — OUTPATIENT (OUTPATIENT)
Dept: OUTPATIENT SERVICES | Facility: HOSPITAL | Age: 79
LOS: 1 days | End: 2024-11-25

## 2024-11-25 DIAGNOSIS — Z98.890 OTHER SPECIFIED POSTPROCEDURAL STATES: Chronic | ICD-10-CM

## 2024-11-25 DIAGNOSIS — C61 MALIGNANT NEOPLASM OF PROSTATE: ICD-10-CM

## 2024-11-25 PROCEDURE — 77014: CPT | Mod: 26

## 2024-11-26 ENCOUNTER — NON-APPOINTMENT (OUTPATIENT)
Age: 79
End: 2024-11-26

## 2024-11-26 ENCOUNTER — OUTPATIENT (OUTPATIENT)
Dept: OUTPATIENT SERVICES | Facility: HOSPITAL | Age: 79
LOS: 1 days | End: 2024-11-26

## 2024-11-26 VITALS
BODY MASS INDEX: 25.88 KG/M2 | HEART RATE: 81 BPM | RESPIRATION RATE: 16 BRPM | DIASTOLIC BLOOD PRESSURE: 84 MMHG | OXYGEN SATURATION: 99 % | TEMPERATURE: 97.9 F | WEIGHT: 165.25 LBS | SYSTOLIC BLOOD PRESSURE: 148 MMHG

## 2024-11-26 DIAGNOSIS — Z98.890 OTHER SPECIFIED POSTPROCEDURAL STATES: Chronic | ICD-10-CM

## 2024-11-26 DIAGNOSIS — I25.810 ATHEROSCLEROSIS OF CORONARY ARTERY BYPASS GRAFT(S) WITHOUT ANGINA PECTORIS: Chronic | ICD-10-CM

## 2024-11-26 DIAGNOSIS — I25.701 ATHEROSCLEROSIS OF CORONARY ARTERY BYPASS GRAFT(S), UNSPECIFIED, WITH ANGINA PECTORIS WITH DOCUMENTED SPASM: Chronic | ICD-10-CM

## 2024-11-26 PROCEDURE — 77014: CPT | Mod: 26

## 2024-11-26 RX ORDER — TICAGRELOR 90 MG/1
0 TABLET ORAL
Refills: 0 | DISCHARGE

## 2024-11-27 ENCOUNTER — OUTPATIENT (OUTPATIENT)
Dept: OUTPATIENT SERVICES | Facility: HOSPITAL | Age: 79
LOS: 1 days | End: 2024-11-27

## 2024-11-27 VITALS — BODY MASS INDEX: 25.88 KG/M2 | WEIGHT: 165.25 LBS

## 2024-11-27 DIAGNOSIS — Z98.890 OTHER SPECIFIED POSTPROCEDURAL STATES: Chronic | ICD-10-CM

## 2024-11-27 DIAGNOSIS — C61 MALIGNANT NEOPLASM OF PROSTATE: ICD-10-CM

## 2024-11-27 DIAGNOSIS — I25.701 ATHEROSCLEROSIS OF CORONARY ARTERY BYPASS GRAFT(S), UNSPECIFIED, WITH ANGINA PECTORIS WITH DOCUMENTED SPASM: Chronic | ICD-10-CM

## 2024-11-27 DIAGNOSIS — I25.810 ATHEROSCLEROSIS OF CORONARY ARTERY BYPASS GRAFT(S) WITHOUT ANGINA PECTORIS: Chronic | ICD-10-CM

## 2024-12-02 ENCOUNTER — OUTPATIENT (OUTPATIENT)
Dept: OUTPATIENT SERVICES | Facility: HOSPITAL | Age: 79
LOS: 1 days | End: 2024-12-02
Payer: MEDICARE

## 2024-12-02 DIAGNOSIS — Z98.890 OTHER SPECIFIED POSTPROCEDURAL STATES: Chronic | ICD-10-CM

## 2024-12-02 DIAGNOSIS — I25.810 ATHEROSCLEROSIS OF CORONARY ARTERY BYPASS GRAFT(S) WITHOUT ANGINA PECTORIS: Chronic | ICD-10-CM

## 2024-12-02 DIAGNOSIS — I25.701 ATHEROSCLEROSIS OF CORONARY ARTERY BYPASS GRAFT(S), UNSPECIFIED, WITH ANGINA PECTORIS WITH DOCUMENTED SPASM: Chronic | ICD-10-CM

## 2024-12-02 PROCEDURE — 77427 RADIATION TX MANAGEMENT X5: CPT

## 2024-12-02 PROCEDURE — 77385: CPT

## 2024-12-02 PROCEDURE — 77014: CPT | Mod: 26

## 2024-12-03 ENCOUNTER — NON-APPOINTMENT (OUTPATIENT)
Age: 79
End: 2024-12-03

## 2024-12-03 ENCOUNTER — OUTPATIENT (OUTPATIENT)
Dept: OUTPATIENT SERVICES | Facility: HOSPITAL | Age: 79
LOS: 1 days | End: 2024-12-03

## 2024-12-03 VITALS
SYSTOLIC BLOOD PRESSURE: 135 MMHG | OXYGEN SATURATION: 99 % | WEIGHT: 164.25 LBS | BODY MASS INDEX: 25.73 KG/M2 | RESPIRATION RATE: 16 BRPM | TEMPERATURE: 97.8 F | DIASTOLIC BLOOD PRESSURE: 75 MMHG | HEART RATE: 81 BPM

## 2024-12-03 DIAGNOSIS — C61 MALIGNANT NEOPLASM OF PROSTATE: ICD-10-CM

## 2024-12-03 DIAGNOSIS — I25.810 ATHEROSCLEROSIS OF CORONARY ARTERY BYPASS GRAFT(S) WITHOUT ANGINA PECTORIS: Chronic | ICD-10-CM

## 2024-12-03 DIAGNOSIS — I25.701 ATHEROSCLEROSIS OF CORONARY ARTERY BYPASS GRAFT(S), UNSPECIFIED, WITH ANGINA PECTORIS WITH DOCUMENTED SPASM: Chronic | ICD-10-CM

## 2024-12-03 DIAGNOSIS — Z98.890 OTHER SPECIFIED POSTPROCEDURAL STATES: Chronic | ICD-10-CM

## 2024-12-03 PROCEDURE — 77014: CPT | Mod: 26

## 2024-12-04 ENCOUNTER — OUTPATIENT (OUTPATIENT)
Dept: OUTPATIENT SERVICES | Facility: HOSPITAL | Age: 79
LOS: 1 days | End: 2024-12-04

## 2024-12-04 ENCOUNTER — APPOINTMENT (OUTPATIENT)
Dept: NEUROSURGERY | Facility: CLINIC | Age: 79
End: 2024-12-04

## 2024-12-04 VITALS
SYSTOLIC BLOOD PRESSURE: 122 MMHG | WEIGHT: 164 LBS | BODY MASS INDEX: 25.74 KG/M2 | HEIGHT: 67 IN | DIASTOLIC BLOOD PRESSURE: 78 MMHG

## 2024-12-04 DIAGNOSIS — Z98.890 OTHER SPECIFIED POSTPROCEDURAL STATES: Chronic | ICD-10-CM

## 2024-12-04 DIAGNOSIS — C61 MALIGNANT NEOPLASM OF PROSTATE: ICD-10-CM

## 2024-12-04 DIAGNOSIS — I25.810 ATHEROSCLEROSIS OF CORONARY ARTERY BYPASS GRAFT(S) WITHOUT ANGINA PECTORIS: Chronic | ICD-10-CM

## 2024-12-04 DIAGNOSIS — I65.21 OCCLUSION AND STENOSIS OF RIGHT CAROTID ARTERY: ICD-10-CM

## 2024-12-04 DIAGNOSIS — I25.701 ATHEROSCLEROSIS OF CORONARY ARTERY BYPASS GRAFT(S), UNSPECIFIED, WITH ANGINA PECTORIS WITH DOCUMENTED SPASM: Chronic | ICD-10-CM

## 2024-12-04 PROCEDURE — 77014: CPT | Mod: 26

## 2024-12-04 PROCEDURE — 99213 OFFICE O/P EST LOW 20 MIN: CPT

## 2024-12-05 ENCOUNTER — OUTPATIENT (OUTPATIENT)
Dept: OUTPATIENT SERVICES | Facility: HOSPITAL | Age: 79
LOS: 1 days | End: 2024-12-05

## 2024-12-05 DIAGNOSIS — C61 MALIGNANT NEOPLASM OF PROSTATE: ICD-10-CM

## 2024-12-05 DIAGNOSIS — Z98.890 OTHER SPECIFIED POSTPROCEDURAL STATES: Chronic | ICD-10-CM

## 2024-12-05 DIAGNOSIS — I25.810 ATHEROSCLEROSIS OF CORONARY ARTERY BYPASS GRAFT(S) WITHOUT ANGINA PECTORIS: Chronic | ICD-10-CM

## 2024-12-05 DIAGNOSIS — I25.701 ATHEROSCLEROSIS OF CORONARY ARTERY BYPASS GRAFT(S), UNSPECIFIED, WITH ANGINA PECTORIS WITH DOCUMENTED SPASM: Chronic | ICD-10-CM

## 2024-12-05 PROCEDURE — 77014: CPT | Mod: 26

## 2024-12-06 ENCOUNTER — OUTPATIENT (OUTPATIENT)
Dept: OUTPATIENT SERVICES | Facility: HOSPITAL | Age: 79
LOS: 1 days | End: 2024-12-06

## 2024-12-06 DIAGNOSIS — I25.810 ATHEROSCLEROSIS OF CORONARY ARTERY BYPASS GRAFT(S) WITHOUT ANGINA PECTORIS: Chronic | ICD-10-CM

## 2024-12-06 DIAGNOSIS — C61 MALIGNANT NEOPLASM OF PROSTATE: ICD-10-CM

## 2024-12-06 DIAGNOSIS — Z98.890 OTHER SPECIFIED POSTPROCEDURAL STATES: Chronic | ICD-10-CM

## 2024-12-06 DIAGNOSIS — I25.701 ATHEROSCLEROSIS OF CORONARY ARTERY BYPASS GRAFT(S), UNSPECIFIED, WITH ANGINA PECTORIS WITH DOCUMENTED SPASM: Chronic | ICD-10-CM

## 2024-12-06 PROCEDURE — 77427 RADIATION TX MANAGEMENT X5: CPT

## 2024-12-09 ENCOUNTER — OUTPATIENT (OUTPATIENT)
Dept: OUTPATIENT SERVICES | Facility: HOSPITAL | Age: 79
LOS: 1 days | End: 2024-12-09

## 2024-12-09 DIAGNOSIS — I25.701 ATHEROSCLEROSIS OF CORONARY ARTERY BYPASS GRAFT(S), UNSPECIFIED, WITH ANGINA PECTORIS WITH DOCUMENTED SPASM: Chronic | ICD-10-CM

## 2024-12-09 DIAGNOSIS — Z98.890 OTHER SPECIFIED POSTPROCEDURAL STATES: Chronic | ICD-10-CM

## 2024-12-09 DIAGNOSIS — I25.810 ATHEROSCLEROSIS OF CORONARY ARTERY BYPASS GRAFT(S) WITHOUT ANGINA PECTORIS: Chronic | ICD-10-CM

## 2024-12-09 PROCEDURE — 77014: CPT | Mod: 26

## 2024-12-10 ENCOUNTER — OUTPATIENT (OUTPATIENT)
Dept: OUTPATIENT SERVICES | Facility: HOSPITAL | Age: 79
LOS: 1 days | End: 2024-12-10

## 2024-12-10 ENCOUNTER — NON-APPOINTMENT (OUTPATIENT)
Age: 79
End: 2024-12-10

## 2024-12-10 VITALS
WEIGHT: 165 LBS | OXYGEN SATURATION: 99 % | BODY MASS INDEX: 25.84 KG/M2 | TEMPERATURE: 97.6 F | RESPIRATION RATE: 16 BRPM | SYSTOLIC BLOOD PRESSURE: 155 MMHG | HEART RATE: 76 BPM | DIASTOLIC BLOOD PRESSURE: 74 MMHG

## 2024-12-10 DIAGNOSIS — I25.810 ATHEROSCLEROSIS OF CORONARY ARTERY BYPASS GRAFT(S) WITHOUT ANGINA PECTORIS: Chronic | ICD-10-CM

## 2024-12-10 DIAGNOSIS — Z98.890 OTHER SPECIFIED POSTPROCEDURAL STATES: Chronic | ICD-10-CM

## 2024-12-10 DIAGNOSIS — C61 MALIGNANT NEOPLASM OF PROSTATE: ICD-10-CM

## 2024-12-10 DIAGNOSIS — I25.701 ATHEROSCLEROSIS OF CORONARY ARTERY BYPASS GRAFT(S), UNSPECIFIED, WITH ANGINA PECTORIS WITH DOCUMENTED SPASM: Chronic | ICD-10-CM

## 2024-12-10 PROCEDURE — 77427 RADIATION TX MANAGEMENT X5: CPT

## 2024-12-11 DIAGNOSIS — C61 MALIGNANT NEOPLASM OF PROSTATE: ICD-10-CM

## 2024-12-19 ENCOUNTER — APPOINTMENT (OUTPATIENT)
Dept: UROLOGY | Facility: CLINIC | Age: 79
End: 2024-12-19
Payer: MEDICARE

## 2024-12-19 ENCOUNTER — RX RENEWAL (OUTPATIENT)
Age: 79
End: 2024-12-19

## 2024-12-19 DIAGNOSIS — C61 MALIGNANT NEOPLASM OF PROSTATE: ICD-10-CM

## 2024-12-19 DIAGNOSIS — R39.9 UNSPECIFIED SYMPTOMS AND SIGNS INVOLVING THE GENITOURINARY SYSTEM: ICD-10-CM

## 2024-12-19 LAB
BILIRUB UR QL STRIP: NORMAL
COLLECTION METHOD: NORMAL
GLUCOSE UR-MCNC: NORMAL
HCG UR QL: NORMAL EU/DL
HGB UR QL STRIP.AUTO: NORMAL
KETONES UR-MCNC: NORMAL
LEUKOCYTE ESTERASE UR QL STRIP: NORMAL
NITRITE UR QL STRIP: NORMAL
PH UR STRIP: 7
PROT UR STRIP-MCNC: NORMAL
SP GR UR STRIP: 1.02

## 2024-12-19 PROCEDURE — 81003 URINALYSIS AUTO W/O SCOPE: CPT | Mod: QW

## 2024-12-19 PROCEDURE — 96402 CHEMO HORMON ANTINEOPL SQ/IM: CPT

## 2025-01-17 ENCOUNTER — APPOINTMENT (OUTPATIENT)
Dept: CARDIOLOGY | Facility: CLINIC | Age: 80
End: 2025-01-17
Payer: MEDICARE

## 2025-01-17 VITALS
BODY MASS INDEX: 25.43 KG/M2 | HEART RATE: 62 BPM | WEIGHT: 162 LBS | HEIGHT: 67 IN | DIASTOLIC BLOOD PRESSURE: 70 MMHG | OXYGEN SATURATION: 97 % | SYSTOLIC BLOOD PRESSURE: 124 MMHG

## 2025-01-17 DIAGNOSIS — E78.5 HYPERLIPIDEMIA, UNSPECIFIED: ICD-10-CM

## 2025-01-17 DIAGNOSIS — I35.1 NONRHEUMATIC AORTIC (VALVE) INSUFFICIENCY: ICD-10-CM

## 2025-01-17 DIAGNOSIS — I65.21 OCCLUSION AND STENOSIS OF RIGHT CAROTID ARTERY: ICD-10-CM

## 2025-01-17 DIAGNOSIS — G47.33 OBSTRUCTIVE SLEEP APNEA (ADULT) (PEDIATRIC): ICD-10-CM

## 2025-01-17 DIAGNOSIS — I35.0 NONRHEUMATIC AORTIC (VALVE) STENOSIS: ICD-10-CM

## 2025-01-17 DIAGNOSIS — I20.9 ANGINA PECTORIS, UNSPECIFIED: ICD-10-CM

## 2025-01-17 DIAGNOSIS — I34.0 NONRHEUMATIC MITRAL (VALVE) INSUFFICIENCY: ICD-10-CM

## 2025-01-17 DIAGNOSIS — I10 ESSENTIAL (PRIMARY) HYPERTENSION: ICD-10-CM

## 2025-01-17 DIAGNOSIS — I25.10 ATHEROSCLEROTIC HEART DISEASE OF NATIVE CORONARY ARTERY W/OUT ANGINA PECTORIS: ICD-10-CM

## 2025-01-17 PROCEDURE — 93000 ELECTROCARDIOGRAM COMPLETE: CPT

## 2025-01-17 PROCEDURE — 99214 OFFICE O/P EST MOD 30 MIN: CPT

## 2025-01-22 ENCOUNTER — OUTPATIENT (OUTPATIENT)
Dept: OUTPATIENT SERVICES | Facility: HOSPITAL | Age: 80
LOS: 1 days | End: 2025-01-22
Payer: MEDICARE

## 2025-01-22 ENCOUNTER — APPOINTMENT (OUTPATIENT)
Dept: RADIATION ONCOLOGY | Facility: HOSPITAL | Age: 80
End: 2025-01-22
Payer: MEDICARE

## 2025-01-22 VITALS
TEMPERATURE: 97.5 F | WEIGHT: 165.5 LBS | SYSTOLIC BLOOD PRESSURE: 137 MMHG | HEART RATE: 85 BPM | DIASTOLIC BLOOD PRESSURE: 71 MMHG | RESPIRATION RATE: 16 BRPM | BODY MASS INDEX: 25.92 KG/M2 | OXYGEN SATURATION: 96 %

## 2025-01-22 DIAGNOSIS — Z98.890 OTHER SPECIFIED POSTPROCEDURAL STATES: Chronic | ICD-10-CM

## 2025-01-22 DIAGNOSIS — Z92.3 PERSONAL HISTORY OF IRRADIATION: ICD-10-CM

## 2025-01-22 DIAGNOSIS — C61 MALIGNANT NEOPLASM OF PROSTATE: ICD-10-CM

## 2025-01-22 DIAGNOSIS — I25.701 ATHEROSCLEROSIS OF CORONARY ARTERY BYPASS GRAFT(S), UNSPECIFIED, WITH ANGINA PECTORIS WITH DOCUMENTED SPASM: Chronic | ICD-10-CM

## 2025-01-22 DIAGNOSIS — I25.810 ATHEROSCLEROSIS OF CORONARY ARTERY BYPASS GRAFT(S) WITHOUT ANGINA PECTORIS: Chronic | ICD-10-CM

## 2025-01-22 PROCEDURE — 99024 POSTOP FOLLOW-UP VISIT: CPT

## 2025-01-22 RX ORDER — METOPROLOL SUCCINATE 50 MG/1
50 TABLET, EXTENDED RELEASE ORAL
Refills: 0 | Status: ACTIVE | COMMUNITY

## 2025-02-11 ENCOUNTER — OUTPATIENT (OUTPATIENT)
Dept: OUTPATIENT SERVICES | Facility: HOSPITAL | Age: 80
LOS: 1 days | End: 2025-02-11
Payer: MEDICARE

## 2025-02-11 ENCOUNTER — APPOINTMENT (OUTPATIENT)
Dept: OPHTHALMOLOGY | Facility: CLINIC | Age: 80
End: 2025-02-11
Payer: MEDICARE

## 2025-02-11 DIAGNOSIS — H53.8 OTHER VISUAL DISTURBANCES: ICD-10-CM

## 2025-02-11 DIAGNOSIS — Z98.890 OTHER SPECIFIED POSTPROCEDURAL STATES: Chronic | ICD-10-CM

## 2025-02-11 DIAGNOSIS — I25.701 ATHEROSCLEROSIS OF CORONARY ARTERY BYPASS GRAFT(S), UNSPECIFIED, WITH ANGINA PECTORIS WITH DOCUMENTED SPASM: Chronic | ICD-10-CM

## 2025-02-11 PROCEDURE — 99213 OFFICE O/P EST LOW 20 MIN: CPT

## 2025-03-19 ENCOUNTER — APPOINTMENT (OUTPATIENT)
Dept: UROLOGY | Facility: CLINIC | Age: 80
End: 2025-03-19
Payer: MEDICARE

## 2025-03-19 VITALS
OXYGEN SATURATION: 97 % | RESPIRATION RATE: 17 BRPM | WEIGHT: 165 LBS | HEIGHT: 67 IN | DIASTOLIC BLOOD PRESSURE: 50 MMHG | HEART RATE: 68 BPM | SYSTOLIC BLOOD PRESSURE: 129 MMHG | TEMPERATURE: 98 F | BODY MASS INDEX: 25.9 KG/M2

## 2025-03-19 DIAGNOSIS — C61 MALIGNANT NEOPLASM OF PROSTATE: ICD-10-CM

## 2025-03-19 DIAGNOSIS — R39.9 UNSPECIFIED SYMPTOMS AND SIGNS INVOLVING THE GENITOURINARY SYSTEM: ICD-10-CM

## 2025-03-19 PROCEDURE — 96402 CHEMO HORMON ANTINEOPL SQ/IM: CPT

## 2025-03-26 ENCOUNTER — RX RENEWAL (OUTPATIENT)
Age: 80
End: 2025-03-26

## 2025-05-13 ENCOUNTER — APPOINTMENT (OUTPATIENT)
Dept: OPHTHALMOLOGY | Facility: CLINIC | Age: 80
End: 2025-05-13
Payer: MEDICARE

## 2025-05-13 ENCOUNTER — OUTPATIENT (OUTPATIENT)
Dept: OUTPATIENT SERVICES | Facility: HOSPITAL | Age: 80
LOS: 1 days | End: 2025-05-13
Payer: MEDICARE

## 2025-05-13 DIAGNOSIS — Z98.890 OTHER SPECIFIED POSTPROCEDURAL STATES: Chronic | ICD-10-CM

## 2025-05-13 DIAGNOSIS — I25.701 ATHEROSCLEROSIS OF CORONARY ARTERY BYPASS GRAFT(S), UNSPECIFIED, WITH ANGINA PECTORIS WITH DOCUMENTED SPASM: Chronic | ICD-10-CM

## 2025-05-13 DIAGNOSIS — H53.8 OTHER VISUAL DISTURBANCES: ICD-10-CM

## 2025-05-13 DIAGNOSIS — I25.810 ATHEROSCLEROSIS OF CORONARY ARTERY BYPASS GRAFT(S) WITHOUT ANGINA PECTORIS: Chronic | ICD-10-CM

## 2025-05-13 PROCEDURE — 92134 CPTRZ OPH DX IMG PST SGM RTA: CPT | Mod: 26

## 2025-05-13 PROCEDURE — 99214 OFFICE O/P EST MOD 30 MIN: CPT

## 2025-05-13 PROCEDURE — 92134 CPTRZ OPH DX IMG PST SGM RTA: CPT

## 2025-05-15 DIAGNOSIS — T15.12XA FOREIGN BODY IN CONJUNCTIVAL SAC, LEFT EYE, INITIAL ENCOUNTER: ICD-10-CM

## 2025-05-15 DIAGNOSIS — H46.8 OTHER OPTIC NEURITIS: ICD-10-CM

## 2025-05-15 DIAGNOSIS — H05.232 HEMORRHAGE OF LEFT ORBIT: ICD-10-CM

## 2025-05-15 DIAGNOSIS — H05.231 HEMORRHAGE OF RIGHT ORBIT: ICD-10-CM

## 2025-05-15 DIAGNOSIS — H43.813 VITREOUS DEGENERATION, BILATERAL: ICD-10-CM

## 2025-05-16 ENCOUNTER — NON-APPOINTMENT (OUTPATIENT)
Age: 80
End: 2025-05-16

## 2025-05-16 ENCOUNTER — APPOINTMENT (OUTPATIENT)
Dept: CARDIOLOGY | Facility: CLINIC | Age: 80
End: 2025-05-16

## 2025-05-16 VITALS
HEIGHT: 67 IN | SYSTOLIC BLOOD PRESSURE: 102 MMHG | HEART RATE: 68 BPM | BODY MASS INDEX: 25.43 KG/M2 | WEIGHT: 162 LBS | DIASTOLIC BLOOD PRESSURE: 66 MMHG | OXYGEN SATURATION: 96 %

## 2025-05-16 DIAGNOSIS — I35.1 NONRHEUMATIC AORTIC (VALVE) INSUFFICIENCY: ICD-10-CM

## 2025-05-16 DIAGNOSIS — C61 MALIGNANT NEOPLASM OF PROSTATE: ICD-10-CM

## 2025-05-16 DIAGNOSIS — I20.9 ANGINA PECTORIS, UNSPECIFIED: ICD-10-CM

## 2025-05-16 DIAGNOSIS — I65.21 OCCLUSION AND STENOSIS OF RIGHT CAROTID ARTERY: ICD-10-CM

## 2025-05-16 DIAGNOSIS — I10 ESSENTIAL (PRIMARY) HYPERTENSION: ICD-10-CM

## 2025-05-16 DIAGNOSIS — I34.0 NONRHEUMATIC MITRAL (VALVE) INSUFFICIENCY: ICD-10-CM

## 2025-05-16 DIAGNOSIS — I25.10 ATHEROSCLEROTIC HEART DISEASE OF NATIVE CORONARY ARTERY W/OUT ANGINA PECTORIS: ICD-10-CM

## 2025-05-16 PROCEDURE — 99214 OFFICE O/P EST MOD 30 MIN: CPT

## 2025-05-16 PROCEDURE — G2211 COMPLEX E/M VISIT ADD ON: CPT

## 2025-06-20 ENCOUNTER — APPOINTMENT (OUTPATIENT)
Dept: UROLOGY | Facility: CLINIC | Age: 80
End: 2025-06-20
Payer: MEDICARE

## 2025-06-20 VITALS
BODY MASS INDEX: 25.43 KG/M2 | DIASTOLIC BLOOD PRESSURE: 76 MMHG | HEART RATE: 70 BPM | SYSTOLIC BLOOD PRESSURE: 149 MMHG | TEMPERATURE: 98 F | RESPIRATION RATE: 17 BRPM | HEIGHT: 67 IN | WEIGHT: 162 LBS | OXYGEN SATURATION: 95 %

## 2025-06-20 LAB
BILIRUB UR QL STRIP: NORMAL
COLLECTION METHOD: NORMAL
GLUCOSE UR-MCNC: NORMAL
HCG UR QL: 0.2 EU/DL
HGB UR QL STRIP.AUTO: NORMAL
KETONES UR-MCNC: NORMAL
LEUKOCYTE ESTERASE UR QL STRIP: NORMAL
NITRITE UR QL STRIP: NORMAL
PH UR STRIP: 7
PROT UR STRIP-MCNC: NORMAL
SP GR UR STRIP: 1.02

## 2025-06-20 PROCEDURE — 99214 OFFICE O/P EST MOD 30 MIN: CPT | Mod: 24

## 2025-06-20 PROCEDURE — 96402 CHEMO HORMON ANTINEOPL SQ/IM: CPT

## 2025-07-18 ENCOUNTER — APPOINTMENT (OUTPATIENT)
Dept: RADIATION ONCOLOGY | Facility: HOSPITAL | Age: 80
End: 2025-07-18
Payer: MEDICARE

## 2025-07-18 VITALS
WEIGHT: 166.25 LBS | HEIGHT: 67 IN | HEART RATE: 67 BPM | SYSTOLIC BLOOD PRESSURE: 121 MMHG | TEMPERATURE: 98.3 F | RESPIRATION RATE: 16 BRPM | BODY MASS INDEX: 26.09 KG/M2 | DIASTOLIC BLOOD PRESSURE: 70 MMHG | OXYGEN SATURATION: 99 %

## 2025-07-18 PROCEDURE — 99213 OFFICE O/P EST LOW 20 MIN: CPT

## 2025-07-18 PROCEDURE — G2211 COMPLEX E/M VISIT ADD ON: CPT

## 2025-08-10 ENCOUNTER — EMERGENCY (EMERGENCY)
Facility: HOSPITAL | Age: 80
LOS: 0 days | Discharge: HOME | End: 2025-08-10
Attending: EMERGENCY MEDICINE
Payer: MEDICARE

## 2025-08-10 VITALS
HEART RATE: 65 BPM | DIASTOLIC BLOOD PRESSURE: 62 MMHG | TEMPERATURE: 98 F | WEIGHT: 160.06 LBS | SYSTOLIC BLOOD PRESSURE: 118 MMHG | HEIGHT: 65 IN | OXYGEN SATURATION: 97 % | RESPIRATION RATE: 17 BRPM

## 2025-08-10 VITALS
TEMPERATURE: 98 F | HEART RATE: 60 BPM | RESPIRATION RATE: 17 BRPM | SYSTOLIC BLOOD PRESSURE: 144 MMHG | OXYGEN SATURATION: 98 % | DIASTOLIC BLOOD PRESSURE: 71 MMHG

## 2025-08-10 DIAGNOSIS — Z85.46 PERSONAL HISTORY OF MALIGNANT NEOPLASM OF PROSTATE: ICD-10-CM

## 2025-08-10 DIAGNOSIS — Z98.890 OTHER SPECIFIED POSTPROCEDURAL STATES: Chronic | ICD-10-CM

## 2025-08-10 DIAGNOSIS — E78.5 HYPERLIPIDEMIA, UNSPECIFIED: ICD-10-CM

## 2025-08-10 DIAGNOSIS — I25.810 ATHEROSCLEROSIS OF CORONARY ARTERY BYPASS GRAFT(S) WITHOUT ANGINA PECTORIS: Chronic | ICD-10-CM

## 2025-08-10 DIAGNOSIS — K92.1 MELENA: ICD-10-CM

## 2025-08-10 DIAGNOSIS — I10 ESSENTIAL (PRIMARY) HYPERTENSION: ICD-10-CM

## 2025-08-10 DIAGNOSIS — I25.10 ATHEROSCLEROTIC HEART DISEASE OF NATIVE CORONARY ARTERY WITHOUT ANGINA PECTORIS: ICD-10-CM

## 2025-08-10 DIAGNOSIS — I25.701 ATHEROSCLEROSIS OF CORONARY ARTERY BYPASS GRAFT(S), UNSPECIFIED, WITH ANGINA PECTORIS WITH DOCUMENTED SPASM: Chronic | ICD-10-CM

## 2025-08-10 DIAGNOSIS — Z95.1 PRESENCE OF AORTOCORONARY BYPASS GRAFT: ICD-10-CM

## 2025-08-10 LAB
ALBUMIN SERPL ELPH-MCNC: 4.3 G/DL — SIGNIFICANT CHANGE UP (ref 3.5–5.2)
ALP SERPL-CCNC: 56 U/L — SIGNIFICANT CHANGE UP (ref 30–115)
ALT FLD-CCNC: 17 U/L — SIGNIFICANT CHANGE UP (ref 0–41)
ANION GAP SERPL CALC-SCNC: 13 MMOL/L — SIGNIFICANT CHANGE UP (ref 7–14)
APTT BLD: 27.4 SEC — SIGNIFICANT CHANGE UP (ref 27–39.2)
AST SERPL-CCNC: 22 U/L — SIGNIFICANT CHANGE UP (ref 0–41)
BASOPHILS # BLD AUTO: 0.02 K/UL — SIGNIFICANT CHANGE UP (ref 0–0.2)
BASOPHILS NFR BLD AUTO: 0.3 % — SIGNIFICANT CHANGE UP (ref 0–1)
BILIRUB SERPL-MCNC: 0.3 MG/DL — SIGNIFICANT CHANGE UP (ref 0.2–1.2)
BUN SERPL-MCNC: 35 MG/DL — HIGH (ref 10–20)
CALCIUM SERPL-MCNC: 9.3 MG/DL — SIGNIFICANT CHANGE UP (ref 8.4–10.5)
CHLORIDE SERPL-SCNC: 101 MMOL/L — SIGNIFICANT CHANGE UP (ref 98–110)
CO2 SERPL-SCNC: 25 MMOL/L — SIGNIFICANT CHANGE UP (ref 17–32)
CREAT SERPL-MCNC: 1 MG/DL — SIGNIFICANT CHANGE UP (ref 0.7–1.5)
EGFR: 76 ML/MIN/1.73M2 — SIGNIFICANT CHANGE UP
EGFR: 76 ML/MIN/1.73M2 — SIGNIFICANT CHANGE UP
EOSINOPHIL # BLD AUTO: 0.1 K/UL — SIGNIFICANT CHANGE UP (ref 0–0.7)
EOSINOPHIL NFR BLD AUTO: 1.5 % — SIGNIFICANT CHANGE UP (ref 0–8)
GLUCOSE SERPL-MCNC: 94 MG/DL — SIGNIFICANT CHANGE UP (ref 70–99)
HCT VFR BLD CALC: 32.8 % — LOW (ref 42–52)
HGB BLD-MCNC: 10.7 G/DL — LOW (ref 14–18)
IMM GRANULOCYTES NFR BLD AUTO: 0.3 % — SIGNIFICANT CHANGE UP (ref 0.1–0.3)
INR BLD: 0.98 RATIO — SIGNIFICANT CHANGE UP (ref 0.65–1.3)
LYMPHOCYTES # BLD AUTO: 1.09 K/UL — LOW (ref 1.2–3.4)
LYMPHOCYTES # BLD AUTO: 16.2 % — LOW (ref 20.5–51.1)
MCHC RBC-ENTMCNC: 30.7 PG — SIGNIFICANT CHANGE UP (ref 27–31)
MCHC RBC-ENTMCNC: 32.6 G/DL — SIGNIFICANT CHANGE UP (ref 32–37)
MCV RBC AUTO: 94 FL — SIGNIFICANT CHANGE UP (ref 80–94)
MONOCYTES # BLD AUTO: 0.56 K/UL — SIGNIFICANT CHANGE UP (ref 0.1–0.6)
MONOCYTES NFR BLD AUTO: 8.3 % — SIGNIFICANT CHANGE UP (ref 1.7–9.3)
NEUTROPHILS # BLD AUTO: 4.92 K/UL — SIGNIFICANT CHANGE UP (ref 1.4–6.5)
NEUTROPHILS NFR BLD AUTO: 73.4 % — SIGNIFICANT CHANGE UP (ref 42.2–75.2)
NRBC BLD AUTO-RTO: 0 /100 WBCS — SIGNIFICANT CHANGE UP (ref 0–0)
PLATELET # BLD AUTO: 221 K/UL — SIGNIFICANT CHANGE UP (ref 130–400)
PMV BLD: 10 FL — SIGNIFICANT CHANGE UP (ref 7.4–10.4)
POTASSIUM SERPL-MCNC: 4.9 MMOL/L — SIGNIFICANT CHANGE UP (ref 3.5–5)
POTASSIUM SERPL-SCNC: 4.9 MMOL/L — SIGNIFICANT CHANGE UP (ref 3.5–5)
PROT SERPL-MCNC: 6.5 G/DL — SIGNIFICANT CHANGE UP (ref 6–8)
PROTHROM AB SERPL-ACNC: 11.5 SEC — SIGNIFICANT CHANGE UP (ref 9.95–12.87)
RBC # BLD: 3.49 M/UL — LOW (ref 4.7–6.1)
RBC # FLD: 13.4 % — SIGNIFICANT CHANGE UP (ref 11.5–14.5)
SODIUM SERPL-SCNC: 139 MMOL/L — SIGNIFICANT CHANGE UP (ref 135–146)
WBC # BLD: 6.71 K/UL — SIGNIFICANT CHANGE UP (ref 4.8–10.8)
WBC # FLD AUTO: 6.71 K/UL — SIGNIFICANT CHANGE UP (ref 4.8–10.8)

## 2025-08-10 PROCEDURE — 85025 COMPLETE CBC W/AUTO DIFF WBC: CPT

## 2025-08-10 PROCEDURE — 86850 RBC ANTIBODY SCREEN: CPT

## 2025-08-10 PROCEDURE — 86900 BLOOD TYPING SEROLOGIC ABO: CPT

## 2025-08-10 PROCEDURE — 99283 EMERGENCY DEPT VISIT LOW MDM: CPT

## 2025-08-10 PROCEDURE — 80053 COMPREHEN METABOLIC PANEL: CPT

## 2025-08-10 PROCEDURE — 85730 THROMBOPLASTIN TIME PARTIAL: CPT

## 2025-08-10 PROCEDURE — 36415 COLL VENOUS BLD VENIPUNCTURE: CPT

## 2025-08-10 PROCEDURE — 99284 EMERGENCY DEPT VISIT MOD MDM: CPT | Mod: FS

## 2025-08-10 PROCEDURE — 85610 PROTHROMBIN TIME: CPT

## 2025-08-10 PROCEDURE — 86901 BLOOD TYPING SEROLOGIC RH(D): CPT

## 2025-08-12 ENCOUNTER — APPOINTMENT (OUTPATIENT)
Dept: OPHTHALMOLOGY | Facility: CLINIC | Age: 80
End: 2025-08-12
Payer: MEDICARE

## 2025-08-12 ENCOUNTER — OUTPATIENT (OUTPATIENT)
Dept: OUTPATIENT SERVICES | Facility: HOSPITAL | Age: 80
LOS: 1 days | End: 2025-08-12
Payer: MEDICARE

## 2025-08-12 DIAGNOSIS — Z98.890 OTHER SPECIFIED POSTPROCEDURAL STATES: Chronic | ICD-10-CM

## 2025-08-12 DIAGNOSIS — I25.701 ATHEROSCLEROSIS OF CORONARY ARTERY BYPASS GRAFT(S), UNSPECIFIED, WITH ANGINA PECTORIS WITH DOCUMENTED SPASM: Chronic | ICD-10-CM

## 2025-08-12 DIAGNOSIS — H53.8 OTHER VISUAL DISTURBANCES: ICD-10-CM

## 2025-08-12 DIAGNOSIS — I25.810 ATHEROSCLEROSIS OF CORONARY ARTERY BYPASS GRAFT(S) WITHOUT ANGINA PECTORIS: Chronic | ICD-10-CM

## 2025-08-12 PROCEDURE — 92134 CPTRZ OPH DX IMG PST SGM RTA: CPT

## 2025-08-12 PROCEDURE — 92014 COMPRE OPH EXAM EST PT 1/>: CPT

## 2025-08-12 PROCEDURE — 92134 CPTRZ OPH DX IMG PST SGM RTA: CPT | Mod: 26

## 2025-08-21 DIAGNOSIS — H05.232 HEMORRHAGE OF LEFT ORBIT: ICD-10-CM

## 2025-08-21 DIAGNOSIS — H43.813 VITREOUS DEGENERATION, BILATERAL: ICD-10-CM

## 2025-08-21 DIAGNOSIS — S04.012A INJURY OF OPTIC NERVE, LEFT EYE, INITIAL ENCOUNTER: ICD-10-CM

## 2025-08-21 DIAGNOSIS — T15.12XA FOREIGN BODY IN CONJUNCTIVAL SAC, LEFT EYE, INITIAL ENCOUNTER: ICD-10-CM

## 2025-08-21 DIAGNOSIS — H46.8 OTHER OPTIC NEURITIS: ICD-10-CM

## 2025-08-21 DIAGNOSIS — H05.231 HEMORRHAGE OF RIGHT ORBIT: ICD-10-CM

## 2025-09-04 ENCOUNTER — LABORATORY RESULT (OUTPATIENT)
Age: 80
End: 2025-09-04

## 2025-09-18 ENCOUNTER — APPOINTMENT (OUTPATIENT)
Dept: UROLOGY | Facility: CLINIC | Age: 80
End: 2025-09-18

## 2025-09-18 ENCOUNTER — APPOINTMENT (OUTPATIENT)
Dept: UROLOGY | Facility: CLINIC | Age: 80
End: 2025-09-18
Payer: MEDICARE

## 2025-09-18 PROCEDURE — 96402 CHEMO HORMON ANTINEOPL SQ/IM: CPT
